# Patient Record
Sex: FEMALE | Race: WHITE | Employment: FULL TIME | URBAN - NONMETROPOLITAN AREA
[De-identification: names, ages, dates, MRNs, and addresses within clinical notes are randomized per-mention and may not be internally consistent; named-entity substitution may affect disease eponyms.]

---

## 2017-08-13 ENCOUNTER — HOSPITAL ENCOUNTER (EMERGENCY)
Age: 57
Discharge: HOME | End: 2017-08-13
Attending: EMERGENCY MEDICINE
Payer: MEDICAID

## 2017-08-13 VITALS
SYSTOLIC BLOOD PRESSURE: 159 MMHG | OXYGEN SATURATION: 100 % | TEMPERATURE: 98.5 F | RESPIRATION RATE: 14 BRPM | HEART RATE: 60 BPM | DIASTOLIC BLOOD PRESSURE: 102 MMHG

## 2017-08-13 DIAGNOSIS — E86.0 DEHYDRATION: ICD-10-CM

## 2017-08-13 LAB
ABSOLUTE EOS #: 0.1 K/UL (ref 0–0.4)
ABSOLUTE LYMPH #: 1.6 K/UL (ref 1.1–2.7)
ABSOLUTE MONO #: 0.3 K/UL (ref 0–1)
ALBUMIN SERPL-MCNC: 4.7 G/DL (ref 3.5–5.2)
ALBUMIN/GLOBULIN RATIO: ABNORMAL (ref 1–2.5)
ALP BLD-CCNC: 92 U/L (ref 35–104)
ALT SERPL-CCNC: 16 U/L (ref 5–33)
ANION GAP SERPL CALCULATED.3IONS-SCNC: 10 MMOL/L (ref 9–17)
AST SERPL-CCNC: 18 U/L
BASOPHILS # BLD: 1 %
BASOPHILS ABSOLUTE: 0 K/UL (ref 0–0.2)
BILIRUB SERPL-MCNC: 0.18 MG/DL (ref 0.3–1.2)
BILIRUBIN URINE: NEGATIVE
BUN BLDV-MCNC: 20 MG/DL (ref 6–20)
BUN/CREAT BLD: 40 (ref 9–20)
CALCIUM SERPL-MCNC: 10 MG/DL (ref 8.6–10.4)
CHLORIDE BLD-SCNC: 101 MMOL/L (ref 98–107)
CO2: 28 MMOL/L (ref 20–31)
COLOR: YELLOW
COMMENT UA: ABNORMAL
CREAT SERPL-MCNC: 0.5 MG/DL (ref 0.5–0.9)
DIFFERENTIAL TYPE: YES
EOSINOPHILS RELATIVE PERCENT: 1 %
GFR AFRICAN AMERICAN: >60 ML/MIN
GFR NON-AFRICAN AMERICAN: >60 ML/MIN
GFR SERPL CREATININE-BSD FRML MDRD: ABNORMAL ML/MIN/{1.73_M2}
GFR SERPL CREATININE-BSD FRML MDRD: ABNORMAL ML/MIN/{1.73_M2}
GLUCOSE BLD-MCNC: 191 MG/DL (ref 65–99)
GLUCOSE BLD-MCNC: 202 MG/DL (ref 70–99)
GLUCOSE URINE: ABNORMAL
HCT VFR BLD CALC: 44.6 % (ref 36–46)
HEMOGLOBIN: 14.6 G/DL (ref 12–16)
KETONES, URINE: NEGATIVE
LEUKOCYTE ESTERASE, URINE: NEGATIVE
LIPASE: 36 U/L (ref 13–60)
LYMPHOCYTES # BLD: 21 %
MCH RBC QN AUTO: 30.3 PG (ref 26–34)
MCHC RBC AUTO-ENTMCNC: 32.7 G/DL (ref 31–37)
MCV RBC AUTO: 92.7 FL (ref 80–100)
MONOCYTES # BLD: 5 %
NITRITE, URINE: NEGATIVE
PDW BLD-RTO: 13.9 % (ref 12.1–15.2)
PH UA: 7 (ref 5–8)
PLATELET # BLD: 299 K/UL (ref 140–450)
PLATELET ESTIMATE: NORMAL
PMV BLD AUTO: NORMAL FL (ref 6–12)
POTASSIUM SERPL-SCNC: 4.1 MMOL/L (ref 3.7–5.3)
PROTEIN UA: NEGATIVE
RBC # BLD: 4.82 M/UL (ref 4–5.2)
RBC # BLD: NORMAL 10*6/UL
SEG NEUTROPHILS: 72 %
SEGMENTED NEUTROPHILS ABSOLUTE COUNT: 5.2 K/UL (ref 2.5–7)
SODIUM BLD-SCNC: 139 MMOL/L (ref 135–144)
SPECIFIC GRAVITY UA: 1.01 (ref 1–1.03)
TOTAL PROTEIN: 8 G/DL (ref 6.4–8.3)
TURBIDITY: CLEAR
URINE HGB: NEGATIVE
UROBILINOGEN, URINE: NORMAL
WBC # BLD: 7.3 K/UL (ref 3.5–11)
WBC # BLD: NORMAL 10*3/UL

## 2017-08-13 PROCEDURE — 83690 ASSAY OF LIPASE: CPT

## 2017-08-13 PROCEDURE — 85025 COMPLETE CBC W/AUTO DIFF WBC: CPT

## 2017-08-13 PROCEDURE — 6360000002 HC RX W HCPCS: Performed by: EMERGENCY MEDICINE

## 2017-08-13 PROCEDURE — 80053 COMPREHEN METABOLIC PANEL: CPT

## 2017-08-13 PROCEDURE — 96375 TX/PRO/DX INJ NEW DRUG ADDON: CPT

## 2017-08-13 PROCEDURE — 2580000003 HC RX 258: Performed by: EMERGENCY MEDICINE

## 2017-08-13 PROCEDURE — 99283 EMERGENCY DEPT VISIT LOW MDM: CPT

## 2017-08-13 PROCEDURE — 96361 HYDRATE IV INFUSION ADD-ON: CPT

## 2017-08-13 PROCEDURE — 82947 ASSAY GLUCOSE BLOOD QUANT: CPT

## 2017-08-13 PROCEDURE — 96374 THER/PROPH/DIAG INJ IV PUSH: CPT

## 2017-08-13 PROCEDURE — 81003 URINALYSIS AUTO W/O SCOPE: CPT

## 2017-08-13 RX ORDER — 0.9 % SODIUM CHLORIDE 0.9 %
1000 INTRAVENOUS SOLUTION INTRAVENOUS ONCE
Status: COMPLETED | OUTPATIENT
Start: 2017-08-13 | End: 2017-08-13

## 2017-08-13 RX ORDER — DIPHENHYDRAMINE HYDROCHLORIDE 50 MG/ML
12.5 INJECTION INTRAMUSCULAR; INTRAVENOUS ONCE
Status: COMPLETED | OUTPATIENT
Start: 2017-08-13 | End: 2017-08-13

## 2017-08-13 RX ORDER — NALBUPHINE HCL 10 MG/ML
10 AMPUL (ML) INJECTION ONCE
Status: DISCONTINUED | OUTPATIENT
Start: 2017-08-13 | End: 2017-08-13

## 2017-08-13 RX ORDER — ONDANSETRON 4 MG/1
4 TABLET, ORALLY DISINTEGRATING ORAL ONCE
Status: COMPLETED | OUTPATIENT
Start: 2017-08-13 | End: 2017-08-13

## 2017-08-13 RX ORDER — NALBUPHINE HCL 10 MG/ML
10 AMPUL (ML) INJECTION ONCE
Status: COMPLETED | OUTPATIENT
Start: 2017-08-13 | End: 2017-08-13

## 2017-08-13 RX ADMIN — DIPHENHYDRAMINE HYDROCHLORIDE 12.5 MG: 50 INJECTION, SOLUTION INTRAMUSCULAR; INTRAVENOUS at 16:08

## 2017-08-13 RX ADMIN — NALBUPHINE HYDROCHLORIDE 10 MG: 10 INJECTION, SOLUTION INTRAMUSCULAR; INTRAVENOUS; SUBCUTANEOUS at 16:09

## 2017-08-13 RX ADMIN — SODIUM CHLORIDE 1000 ML: 0.9 INJECTION, SOLUTION INTRAVENOUS at 14:06

## 2017-08-13 RX ADMIN — ONDANSETRON 4 MG: 4 TABLET, ORALLY DISINTEGRATING ORAL at 14:07

## 2017-08-13 ASSESSMENT — PAIN SCALES - GENERAL: PAINLEVEL_OUTOF10: 10

## 2017-08-14 ENCOUNTER — HOSPITAL ENCOUNTER (OUTPATIENT)
Age: 57
Discharge: HOME | End: 2017-08-14
Payer: MEDICAID

## 2017-08-14 ENCOUNTER — TELEPHONE (OUTPATIENT)
Dept: FAMILY MEDICINE CLINIC | Age: 57
End: 2017-08-14

## 2017-08-14 ENCOUNTER — OFFICE VISIT (OUTPATIENT)
Dept: FAMILY MEDICINE CLINIC | Age: 57
End: 2017-08-14
Payer: MEDICAID

## 2017-08-14 VITALS
RESPIRATION RATE: 16 BRPM | HEIGHT: 65 IN | WEIGHT: 138.2 LBS | HEART RATE: 68 BPM | BODY MASS INDEX: 23.03 KG/M2 | SYSTOLIC BLOOD PRESSURE: 138 MMHG | DIASTOLIC BLOOD PRESSURE: 76 MMHG

## 2017-08-14 DIAGNOSIS — Z11.59 NEED FOR HEPATITIS C SCREENING TEST: ICD-10-CM

## 2017-08-14 DIAGNOSIS — Z00.00 PREVENTATIVE HEALTH CARE: ICD-10-CM

## 2017-08-14 DIAGNOSIS — Z11.4 ENCOUNTER FOR SCREENING FOR HIV: ICD-10-CM

## 2017-08-14 DIAGNOSIS — I10 ESSENTIAL HYPERTENSION: ICD-10-CM

## 2017-08-14 DIAGNOSIS — Z12.31 VISIT FOR SCREENING MAMMOGRAM: ICD-10-CM

## 2017-08-14 DIAGNOSIS — E11.9 TYPE 2 DIABETES MELLITUS WITHOUT COMPLICATION, WITHOUT LONG-TERM CURRENT USE OF INSULIN (HCC): Primary | ICD-10-CM

## 2017-08-14 DIAGNOSIS — E78.2 MIXED HYPERLIPIDEMIA: ICD-10-CM

## 2017-08-14 LAB
ANION GAP SERPL CALCULATED.3IONS-SCNC: 12 MMOL/L (ref 9–17)
BUN BLDV-MCNC: 16 MG/DL (ref 6–20)
BUN/CREAT BLD: 25 (ref 9–20)
CALCIUM SERPL-MCNC: 9.3 MG/DL (ref 8.6–10.4)
CHLORIDE BLD-SCNC: 99 MMOL/L (ref 98–107)
CHOLESTEROL/HDL RATIO: 2.8
CHOLESTEROL: 245 MG/DL
CO2: 27 MMOL/L (ref 20–31)
CREAT SERPL-MCNC: 0.65 MG/DL (ref 0.5–0.9)
GFR AFRICAN AMERICAN: >60 ML/MIN
GFR NON-AFRICAN AMERICAN: >60 ML/MIN
GFR SERPL CREATININE-BSD FRML MDRD: ABNORMAL ML/MIN/{1.73_M2}
GFR SERPL CREATININE-BSD FRML MDRD: ABNORMAL ML/MIN/{1.73_M2}
GLUCOSE BLD-MCNC: 216 MG/DL (ref 70–99)
HBA1C MFR BLD: 7.9 %
HDLC SERPL-MCNC: 88 MG/DL
HEPATITIS C ANTIBODY: NONREACTIVE
HIV AG/AB: NONREACTIVE
LDL CHOLESTEROL: 135 MG/DL (ref 0–130)
PATIENT FASTING?: NO
POTASSIUM SERPL-SCNC: 3.6 MMOL/L (ref 3.7–5.3)
SODIUM BLD-SCNC: 138 MMOL/L (ref 135–144)
TRIGL SERPL-MCNC: 112 MG/DL
VLDLC SERPL CALC-MCNC: ABNORMAL MG/DL (ref 1–30)

## 2017-08-14 PROCEDURE — 80061 LIPID PANEL: CPT

## 2017-08-14 PROCEDURE — 87389 HIV-1 AG W/HIV-1&-2 AB AG IA: CPT

## 2017-08-14 PROCEDURE — 36415 COLL VENOUS BLD VENIPUNCTURE: CPT

## 2017-08-14 PROCEDURE — 80048 BASIC METABOLIC PNL TOTAL CA: CPT

## 2017-08-14 PROCEDURE — 99203 OFFICE O/P NEW LOW 30 MIN: CPT | Performed by: FAMILY MEDICINE

## 2017-08-14 PROCEDURE — 83036 HEMOGLOBIN GLYCOSYLATED A1C: CPT | Performed by: FAMILY MEDICINE

## 2017-08-14 PROCEDURE — 86803 HEPATITIS C AB TEST: CPT

## 2017-08-14 RX ORDER — ROSUVASTATIN CALCIUM 40 MG/1
40 TABLET, COATED ORAL EVERY EVENING
Qty: 90 TABLET | Refills: 2 | Status: SHIPPED | OUTPATIENT
Start: 2017-08-14 | End: 2017-08-15 | Stop reason: CLARIF

## 2017-08-14 RX ORDER — LOSARTAN POTASSIUM 25 MG/1
25 TABLET ORAL DAILY
Qty: 90 TABLET | Refills: 2 | Status: SHIPPED | OUTPATIENT
Start: 2017-08-14 | End: 2017-08-14 | Stop reason: DRUGHIGH

## 2017-08-14 RX ORDER — GABAPENTIN 100 MG/1
100 CAPSULE ORAL 3 TIMES DAILY
Qty: 90 CAPSULE | Refills: 5 | Status: SHIPPED | OUTPATIENT
Start: 2017-08-14 | End: 2019-01-21 | Stop reason: ALTCHOICE

## 2017-08-14 RX ORDER — GLIPIZIDE 10 MG/1
10 TABLET ORAL DAILY
Qty: 90 TABLET | Refills: 2 | Status: SHIPPED | OUTPATIENT
Start: 2017-08-14 | End: 2017-10-03

## 2017-08-14 RX ORDER — HYDROCHLOROTHIAZIDE 12.5 MG/1
12.5 CAPSULE, GELATIN COATED ORAL DAILY
Qty: 90 CAPSULE | Refills: 2 | Status: SHIPPED | OUTPATIENT
Start: 2017-08-14 | End: 2019-01-21 | Stop reason: ALTCHOICE

## 2017-08-14 RX ORDER — LOSARTAN POTASSIUM 50 MG/1
50 TABLET ORAL DAILY
Qty: 90 TABLET | Refills: 2 | Status: SHIPPED | OUTPATIENT
Start: 2017-08-14 | End: 2019-02-02 | Stop reason: SDUPTHER

## 2017-08-14 ASSESSMENT — ENCOUNTER SYMPTOMS
CHOKING: 0
APNEA: 0
ANAL BLEEDING: 0
SHORTNESS OF BREATH: 0
VOICE CHANGE: 0
DIARRHEA: 0
FACIAL SWELLING: 0
STRIDOR: 0
VOMITING: 0
EYE REDNESS: 0
COUGH: 0
SORE THROAT: 0
EYE DISCHARGE: 0
COLOR CHANGE: 0
WHEEZING: 0
RECTAL PAIN: 0
EYE PAIN: 0
CONSTIPATION: 0
ALLERGIC/IMMUNOLOGIC NEGATIVE: 1
TROUBLE SWALLOWING: 0
PHOTOPHOBIA: 0
BACK PAIN: 0
NAUSEA: 0
EYE ITCHING: 0
VISUAL CHANGE: 0
ABDOMINAL DISTENTION: 0
ABDOMINAL PAIN: 0
SINUS PRESSURE: 0
BLOOD IN STOOL: 0
CHEST TIGHTNESS: 0
RHINORRHEA: 0

## 2017-08-14 ASSESSMENT — PATIENT HEALTH QUESTIONNAIRE - PHQ9
SUM OF ALL RESPONSES TO PHQ QUESTIONS 1-9: 2
SUM OF ALL RESPONSES TO PHQ9 QUESTIONS 1 & 2: 2
1. LITTLE INTEREST OR PLEASURE IN DOING THINGS: 1
2. FEELING DOWN, DEPRESSED OR HOPELESS: 1

## 2017-08-15 RX ORDER — SIMVASTATIN 40 MG
40 TABLET ORAL EVERY EVENING
Qty: 90 TABLET | Refills: 2 | Status: SHIPPED | OUTPATIENT
Start: 2017-08-15 | End: 2019-01-21 | Stop reason: SDUPTHER

## 2017-08-18 DIAGNOSIS — Z12.11 COLON CANCER SCREENING: Primary | ICD-10-CM

## 2017-08-18 LAB
CONTROL: YES
HEMOCCULT STL QL: NORMAL

## 2017-08-18 PROCEDURE — 82274 ASSAY TEST FOR BLOOD FECAL: CPT | Performed by: FAMILY MEDICINE

## 2017-08-23 ENCOUNTER — TELEPHONE (OUTPATIENT)
Dept: DIABETES SERVICES | Age: 57
End: 2017-08-23

## 2017-08-28 ENCOUNTER — TELEPHONE (OUTPATIENT)
Dept: DIABETES SERVICES | Age: 57
End: 2017-08-28

## 2017-08-30 ENCOUNTER — HOSPITAL ENCOUNTER (OUTPATIENT)
Dept: MAMMOGRAPHY | Age: 57
Discharge: HOME | End: 2017-08-30
Payer: MEDICAID

## 2017-08-30 DIAGNOSIS — Z12.31 VISIT FOR SCREENING MAMMOGRAM: ICD-10-CM

## 2017-08-30 PROCEDURE — G0202 SCR MAMMO BI INCL CAD: HCPCS

## 2017-08-31 ENCOUNTER — TELEPHONE (OUTPATIENT)
Dept: FAMILY MEDICINE CLINIC | Age: 57
End: 2017-08-31

## 2017-09-01 RX ORDER — GLUCOSAMINE HCL/CHONDROITIN SU 500-400 MG
1 CAPSULE ORAL 4 TIMES DAILY
Qty: 200 STRIP | Refills: 3 | Status: SHIPPED | OUTPATIENT
Start: 2017-09-01 | End: 2017-11-14 | Stop reason: ALTCHOICE

## 2017-09-14 ENCOUNTER — HOSPITAL ENCOUNTER (OUTPATIENT)
Dept: DIABETES SERVICES | Age: 57
Setting detail: THERAPIES SERIES
Discharge: HOME | End: 2017-09-14
Payer: MEDICAID

## 2017-09-14 VITALS
DIASTOLIC BLOOD PRESSURE: 74 MMHG | BODY MASS INDEX: 23.79 KG/M2 | WEIGHT: 142.8 LBS | SYSTOLIC BLOOD PRESSURE: 121 MMHG | HEIGHT: 65 IN

## 2017-09-14 PROCEDURE — G0108 DIAB MANAGE TRN  PER INDIV: HCPCS

## 2017-09-27 ENCOUNTER — HOSPITAL ENCOUNTER (OUTPATIENT)
Dept: DIABETES SERVICES | Age: 57
Setting detail: THERAPIES SERIES
Discharge: HOME | End: 2017-09-27
Payer: MEDICAID

## 2017-09-27 VITALS
BODY MASS INDEX: 23.32 KG/M2 | DIASTOLIC BLOOD PRESSURE: 72 MMHG | HEIGHT: 65 IN | SYSTOLIC BLOOD PRESSURE: 127 MMHG | WEIGHT: 140 LBS

## 2017-09-27 PROCEDURE — G0108 DIAB MANAGE TRN  PER INDIV: HCPCS

## 2017-10-03 ENCOUNTER — HOSPITAL ENCOUNTER (EMERGENCY)
Age: 57
Discharge: HOME | End: 2017-10-03
Attending: FAMILY MEDICINE
Payer: MEDICAID

## 2017-10-03 VITALS
WEIGHT: 140.87 LBS | SYSTOLIC BLOOD PRESSURE: 105 MMHG | HEART RATE: 81 BPM | DIASTOLIC BLOOD PRESSURE: 65 MMHG | BODY MASS INDEX: 23.44 KG/M2 | OXYGEN SATURATION: 97 % | TEMPERATURE: 97.4 F | RESPIRATION RATE: 18 BRPM

## 2017-10-03 DIAGNOSIS — E16.0 HYPOGLYCEMIA DUE TO INSULIN: Primary | ICD-10-CM

## 2017-10-03 DIAGNOSIS — E11.9 TYPE 2 DIABETES MELLITUS WITHOUT COMPLICATION, WITHOUT LONG-TERM CURRENT USE OF INSULIN (HCC): ICD-10-CM

## 2017-10-03 DIAGNOSIS — T38.3X5A HYPOGLYCEMIA DUE TO INSULIN: Primary | ICD-10-CM

## 2017-10-03 LAB
-: ABNORMAL
ABSOLUTE EOS #: 0.1 K/UL (ref 0–0.4)
ABSOLUTE LYMPH #: 1.7 K/UL (ref 1–4.8)
ABSOLUTE MONO #: 0.4 K/UL (ref 0–1)
AMORPHOUS: ABNORMAL
ANION GAP SERPL CALCULATED.3IONS-SCNC: 13 MMOL/L (ref 9–17)
BACTERIA: ABNORMAL
BASOPHILS # BLD: 1 %
BASOPHILS ABSOLUTE: 0 K/UL (ref 0–0.2)
BILIRUBIN URINE: NEGATIVE
BUN BLDV-MCNC: 18 MG/DL (ref 6–20)
BUN/CREAT BLD: 35 (ref 9–20)
CALCIUM SERPL-MCNC: 10 MG/DL (ref 8.6–10.4)
CASTS UA: ABNORMAL /LPF
CHLORIDE BLD-SCNC: 103 MMOL/L (ref 98–107)
CHP ED QC CHECK: YES
CHP ED QC CHECK: YES
CO2: 27 MMOL/L (ref 20–31)
COLOR: YELLOW
COMMENT UA: ABNORMAL
CREAT SERPL-MCNC: 0.51 MG/DL (ref 0.5–0.9)
CRYSTALS, UA: ABNORMAL /HPF
DIFFERENTIAL TYPE: YES
EOSINOPHILS RELATIVE PERCENT: 2 %
EPITHELIAL CELLS UA: ABNORMAL /HPF
GFR AFRICAN AMERICAN: >60 ML/MIN
GFR NON-AFRICAN AMERICAN: >60 ML/MIN
GFR SERPL CREATININE-BSD FRML MDRD: ABNORMAL ML/MIN/{1.73_M2}
GFR SERPL CREATININE-BSD FRML MDRD: ABNORMAL ML/MIN/{1.73_M2}
GLUCOSE BLD-MCNC: 103 MG/DL
GLUCOSE BLD-MCNC: 103 MG/DL (ref 65–99)
GLUCOSE BLD-MCNC: 129 MG/DL
GLUCOSE BLD-MCNC: 129 MG/DL (ref 65–99)
GLUCOSE BLD-MCNC: 58 MG/DL (ref 70–99)
GLUCOSE BLD-MCNC: 70 MG/DL (ref 65–99)
GLUCOSE URINE: NEGATIVE
HCT VFR BLD CALC: 43.5 % (ref 36–46)
HEMOGLOBIN: 14.3 G/DL (ref 12–16)
KETONES, URINE: NEGATIVE
LEUKOCYTE ESTERASE, URINE: ABNORMAL
LYMPHOCYTES # BLD: 26 %
MCH RBC QN AUTO: 30.5 PG (ref 26–34)
MCHC RBC AUTO-ENTMCNC: 32.8 G/DL (ref 31–37)
MCV RBC AUTO: 93.1 FL (ref 80–100)
MONOCYTES # BLD: 7 %
MUCUS: ABNORMAL
NITRITE, URINE: NEGATIVE
OTHER OBSERVATIONS UA: ABNORMAL
PDW BLD-RTO: 14.5 % (ref 12.1–15.2)
PH UA: 7 (ref 5–8)
PLATELET # BLD: 336 K/UL (ref 140–450)
PLATELET ESTIMATE: NORMAL
PMV BLD AUTO: NORMAL FL (ref 6–12)
POTASSIUM SERPL-SCNC: 4.2 MMOL/L (ref 3.7–5.3)
PROTEIN UA: NEGATIVE
RBC # BLD: 4.67 M/UL (ref 4–5.2)
RBC # BLD: NORMAL 10*6/UL
RBC UA: ABNORMAL /HPF (ref 0–2)
RENAL EPITHELIAL, UA: ABNORMAL /HPF
SEG NEUTROPHILS: 64 %
SEGMENTED NEUTROPHILS ABSOLUTE COUNT: 4.4 K/UL (ref 2.5–7)
SODIUM BLD-SCNC: 143 MMOL/L (ref 135–144)
SPECIFIC GRAVITY UA: 1 (ref 1–1.03)
TRICHOMONAS: ABNORMAL
TURBIDITY: CLEAR
URINE HGB: NEGATIVE
UROBILINOGEN, URINE: NORMAL
WBC # BLD: 6.7 K/UL (ref 3.5–11)
WBC # BLD: NORMAL 10*3/UL
WBC UA: ABNORMAL /HPF
YEAST: ABNORMAL

## 2017-10-03 PROCEDURE — 87086 URINE CULTURE/COLONY COUNT: CPT

## 2017-10-03 PROCEDURE — 99284 EMERGENCY DEPT VISIT MOD MDM: CPT

## 2017-10-03 PROCEDURE — 85025 COMPLETE CBC W/AUTO DIFF WBC: CPT

## 2017-10-03 PROCEDURE — 36415 COLL VENOUS BLD VENIPUNCTURE: CPT

## 2017-10-03 PROCEDURE — 80048 BASIC METABOLIC PNL TOTAL CA: CPT

## 2017-10-03 PROCEDURE — 82947 ASSAY GLUCOSE BLOOD QUANT: CPT

## 2017-10-03 PROCEDURE — 81001 URINALYSIS AUTO W/SCOPE: CPT

## 2017-10-03 RX ORDER — CALCIUM CARBONATE 500(1250)
500 TABLET ORAL DAILY
COMMUNITY
End: 2021-10-18

## 2017-10-03 RX ORDER — M-VIT,TX,IRON,MINS/CALC/FOLIC 27MG-0.4MG
1 TABLET ORAL DAILY
COMMUNITY

## 2017-10-03 NOTE — ED AVS SNAPSHOT
After Visit Summary  (Discharge Instructions)    Medication List for Home    Based on the information you provided to us as well as any changes during this visit, the following is your updated medication list.  Compare this with your prescription bottles at home. If you have any questions or concerns, contact your primary care physician's office. Daily Medication List (This medication list can be shared with any Healthcare provider who is helping you manage your medications)      You told us you were taking these medications at home, but the amount or how often you take this medication has CHANGED     insulin glargine 100 UNIT/ML injection pen   Commonly known as:  LANTUS SOLOSTAR   Inject 5 Units into the skin nightly   What changed:  how much to take         ASK your doctor about these medications if you have questions     BLOOD GLUCOSE TEST STRIPS Strp   1 Units by In Vitro route 4 times daily Freestyle precision.  E11.9       calcium carbonate 500 MG Tabs tablet   Commonly known as:  OSCAL   Take 500 mg by mouth daily       gabapentin 100 MG capsule   Commonly known as:  NEURONTIN   Take 1 capsule by mouth 3 times daily       hydrochlorothiazide 12.5 MG capsule   Commonly known as:  MICROZIDE   Take 1 capsule by mouth daily       Insulin Pen Needle 31G X 6 MM Misc   Commonly known as:  MEIJER PEN NEEDLES   1 each by Does not apply route daily       losartan 50 MG tablet   Commonly known as:  COZAAR   Take 1 tablet by mouth daily       metFORMIN 1000 MG tablet   Commonly known as:  GLUCOPHAGE   Take 1 tablet by mouth 2 times daily (with meals)       naproxen sodium 550 MG tablet   Commonly known as:  ANAPROX   Take 1 tablet by mouth twice a day when necessary pain       simvastatin 40 MG tablet   Commonly known as:  ZOCOR   Take 1 tablet by mouth every evening       therapeutic multivitamin-minerals tablet   Take 1 tablet by mouth daily These are the medications you have told us you were taking at home, STOP taking them after you leave the hospital     glipiZIDE 10 MG tablet   Commonly known as:  Yoabni Sam            Where to Get Your Medications      You can get these medications from any pharmacy     Bring a paper prescription for each of these medications     insulin glargine 100 UNIT/ML injection pen               Allergies as of 10/3/2017     No Known Allergies      Immunizations as of 10/3/2017     No immunizations on file. After Visit Summary    This summary was created for you. Thank you for entrusting your care to us. The following information includes details about your hospital/visit stay along with steps you should take to help with your recovery once you leave the hospital.  In this packet, you will find information about the topics listed below:    · Instructions about your medications including a list of your home medications  · A summary of your hospital visit  · Follow-up appointments once you have left the hospital  · Your care plan at home      You may receive a survey regarding the care you received during your stay. Your input is valuable to us. We encourage you to complete and return your survey in the envelope provided. We hope you will choose us in the future for your healthcare needs. Patient Information     Patient Name LORRAINE Partida 1960      Care Provided at:     Name Address Phone       [address] [phone]            Your Visit    Here you will find information about your visit, including the reason for your visit. Please take this sheet with you when you visit your doctor or other health care provider in the future. It will help determine the best possible medical care for you at that time. If you have any questions once you leave the hospital, please call the department phone number listed below.         Diagnoses this visit Your diagnoses were HYPOGLYCEMIA DUE TO INSULIN and TYPE 2 DIABETES MELLITUS WITHOUT COMPLICATION, WITHOUT LONG-TERM CURRENT USE OF INSULIN ([facility] [address] Visit Information     Date of Visit Department Dept Phone    10/3/2017 [facility] RE GARCIA [phone]      You were seen by     You were seen by Lakeshia Rodriguez DO. Follow-up Appointments    Below is a list of your follow-up and future appointments. This may not be a complete list as you may have made appointments directly with providers that we are not aware of or your providers may have made some for you. Please call your providers to confirm appointments. It is important to keep your appointments. Please bring your current insurance card, photo ID, co-pay, and all medication bottles to your appointment. If self-pay, payment is expected at the time of service. Follow-up Information     Follow up with Stanford Clayton MD In 3 days. Specialty:  Family Medicine    Contact information:    [address] [place] [address] [address]  [phone]        Future Appointments     10/16/2017 9:00 AM     Appointment with Maryan Garcia RD, LD at [address] and Nutrition [phone]   [facility] [address]  [place] [id number]       11/14/2017 3:30 PM     Appointment with Stanford Clayton MD at [address] [facility] ([phone])   Please arrive 15 minutes prior to appointment, bring photo ID and insurance card.    [address]         Preventive Care        Date Due    Diabetic Foot Exam 6/9/1972    Eye Exam By An Eye Doctor 6/9/1972    Tetanus Combination Vaccine (1 - Tdap) 6/9/1981    Pneumococcal Vaccine - Pneumovax for adults aged 19-64 years with: chronic heart disease, chronic lung disease, diabetes mellitus, alcoholism, chronic liver disease, or cigarette smoking. (1 of 1 - PPSV23) 6/9/1981    Pap Smear 6/9/1983    Urine Check For Kidney Problems 10/28/2012    Yearly Flu Vaccine (1) 9/1/2017 they absorb nicotine, carbon monoxide, and other ingredients of tobacco smoke. DO NOT SMOKE AROUND CHILDREN     Children exposed to secondhand smoke are at an increased risk of:  Sudden Infant Death Syndrome (SIDS), acute respiratory infections, inflammation of the middle ear, and severe asthma. Over a longer time, it causes heart disease and lung cancer. There is no safe level of exposure to secondhand smoke. [employer] Signup     [employer] allows you to send messages to your doctor, view your test results, renew your prescriptions, schedule appointments, view visit notes, and more. How Do I Sign Up? 1. In your Internet browser, go to [email or web address]  2. Click on the Sign Up Now link in the Sign In box. You will see the New Member Sign Up page. 3. Enter your [employer] Access Code exactly as it appears below. You will not need to use this code after youve completed the sign-up process. If you do not sign up before the expiration date, you must request a new code. [employer] Access Code: [email or web address]  Expires: 10/12/2017  3:29 PM    4. Enter your Social Security Number ([id number]) and Date of Birth (mm/dd/yyyy) as indicated and click Submit. You will be taken to the next sign-up page. 5. Create a [employer] ID. This will be your [employer] login ID and cannot be changed, so think of one that is secure and easy to remember. 6. Create a [employer] password. You can change your password at any time. 7. Enter your Password Reset Question and Answer. This can be used at a later time if you forget your password. 8. Enter your e-mail address. You will receive e-mail notification when new information is available in [address] 9. Click Sign Up. You can now view your medical record. Additional Information  If you have questions, please contact the physician practice where you receive care. Remember, [employer] is NOT to be used for urgent needs.  For not use the same needle more than one time. Where to give the shot  You can inject insulin into:  · The belly, but at least 2 inches from the belly button. This is considered the best place to inject insulin. · The top outer part of the thighs. Insulin usually is absorbed more slowly from this site, unless you exercise soon after giving the shot. · The outside of the upper arms. You may need help giving yourself shots in this area. · The buttocks. You may need help with injections in the buttocks. Your doctor may advise you to give your shots in different places on your body each day. This is called site rotation. If you are going to rotate sites, check with your doctor to make sure you know how to do it right. Use the same site at the same time of each day. For example, each day:  · At breakfast, give the shot in one of your arms. · At lunch, give the shot in one of your legs. · At dinner, give the shot in your belly. Slightly change the spot where you give an insulin shot each time you do it. For example, use five different places on the right upper arm, then use five places on the left upper arm. Using the same spot every time can cause bumps or pits in the skin and make the shots hurt more. It may also slow down how the insulin is absorbed into your body. Where can you learn more? Go to [email or web address] and sign in to your [employer] account. Enter M652 in the [facility] box to learn more about \"Giving a Single-Dose Insulin Shot: Care Instructions. \"     If you do not have an account, please click on the \"Sign Up Now\" link. Current as of: March 13, 2017  Content Version: 11.3  © 9330-3465 [employer], [employer]. Care instructions adapted under license by [facility] ([facility]).  If you have questions about a medical condition or this instruction, always ask your healthcare professional. [address] any warranty or liability for your use of this your level is getting back to your target range. Here are examples of quick-sugar foods that have 15 grams of carbohydrate:  · 3 to 4 glucose tablets  · 1 tube of glucose gel  · Hard candy (such as 3 Jolly Ranchers or 5 to 7 Life Savers)  · 1 tablespoon honey  · 2 tablespoons of raisins  · ½ cup to ¾ cup (4 to 6 ounces) of fruit juice or regular (not diet) soda  · 1 tablespoon of sugar  · 1 cup of fat-free milk  If you have problems with severe low blood sugar, someone else may have to give you a shot of glucagon. This is a hormone that raises blood sugar levels quickly. How can you prevent low blood sugar? You can take steps to prevent low blood sugar. · Follow your treatment plan. Take your insulin or other diabetes medicine exactly as your doctor prescribed it. Talk with your doctor if you're having low blood sugar often. Your medicine may need to be adjusted if it's causing your low blood sugar. · Check your blood sugar levels often. This helps you find early changes before an emergency happens. · Keep a quick-sugar food with you in case your blood sugar level drops low. · Eat small meals more often so that you don't get too hungry between meals. Don't skip meals. · Balance extra exercise with eating more. Check your blood sugar and learn how it changes after exercise. If your blood sugar stays at a normal level, you may not need to eat after you exercise. · Limit how much alcohol you drink. Alcohol can make low blood sugar go even lower. Don't drink alcohol if you have problems recognizing the early signs of low blood sugar. · Keep a diary of your symptoms. This helps you learn when changes in your body may signal low blood sugar. And keep track of how often you have low blood sugar, including when you last ate and what you ate. This will help you learn what causes your blood sugar to drop. · Learn about diabetes and low blood sugar.  Support groups or a diabetes education center can help you understand how medicines, diet, and exercise affect your blood sugar levels. Since low blood sugar levels can quickly become an emergency, be sure to wear medical alert jewelry, such as a medical alert bracelet. This is to let people know you have diabetes so they can get help for you. You can buy this at most drugstores. And make sure your family, friends, and coworkers know the symptoms of low blood sugar. Teach them what to do to get your sugar level up. Follow-up care is a key part of your treatment and safety. Be sure to make and go to all appointments, and call your doctor if you are having problems. It's also a good idea to know your test results and keep a list of the medicines you take. Where can you learn more? Go to [email or web address]://[email or web address].[email or web address].[email or web address] org and sign in to your [employer]ol[employer] account. Enter V140 in the [employer] box to learn more about \"Learning About Low Blood Sugar (Hypoglycemia) in Diabetes. \"     If you do not have an account, please click on the \"Sign Up Now\" link. Current as of: March 13, 2017  Content Version: 11.3  © 1943-9957 [employer]. Care instructions adapted under license by [facility] ([facility]). If you have questions about a medical condition or this instruction, always ask your healthcare professional. [address] any warranty or liability for your use of this information.

## 2017-10-03 NOTE — ED NOTES
Dr. Bridget Lagunas notified of FSBS result. Patient to be discharged.      Spencer Harris RN  10/03/17 2659

## 2017-10-03 NOTE — ED NOTES
Second breakfast tray given, relates she is full. Encouraged to eat.      Germaine Sauceda RN  10/03/17 3350

## 2017-10-04 LAB
CULTURE: NORMAL
CULTURE: NORMAL
Lab: NORMAL
SPECIMEN DESCRIPTION: NORMAL
SPECIMEN DESCRIPTION: NORMAL
STATUS: NORMAL

## 2017-10-23 ENCOUNTER — TELEPHONE (OUTPATIENT)
Dept: FAMILY MEDICINE CLINIC | Age: 57
End: 2017-10-23

## 2017-10-26 RX ORDER — LANCETS 30 GAUGE
EACH MISCELLANEOUS
Qty: 100 EACH | Refills: 5 | Status: SHIPPED | OUTPATIENT
Start: 2017-10-26

## 2017-10-26 RX ORDER — GLUCOSAMINE HCL/CHONDROITIN SU 500-400 MG
CAPSULE ORAL
Qty: 100 STRIP | Refills: 5 | Status: SHIPPED | OUTPATIENT
Start: 2017-10-26

## 2017-10-30 ENCOUNTER — HOSPITAL ENCOUNTER (OUTPATIENT)
Dept: NUTRITION | Age: 57
Discharge: HOME | End: 2017-10-30
Payer: MEDICAID

## 2017-10-30 PROCEDURE — 97802 MEDICAL NUTRITION INDIV IN: CPT

## 2017-10-30 NOTE — PROGRESS NOTES
having a computer or smart phone to look up any fast food information, so we looked up one of her choices at [employer]. .    Client goals:   Measure food portions    Read food labels for Total Carbohydrate and Serving Size    Use kitchen scale to weigh items like banana bread    Keep consistent meal timing    Avoid excess fat, saturated fat and trans fats    Use MyPlate as template for nutrition balance    Ask restaurants/fast foods for nutrition information    Continue to pack lunches for work    Monitor weight (has lost a little unintentionally)    Expected compliance:  Good. Client was attentive and had good baseline knowledge of carbohydrates (through prior reading). Client appears to be in an action to maintenence phase of change. Recommend follow up as needed. RD name and phone number provided. Thank you for the referral.    Electronically signed by Josr Lott RD, FAIZA on 10/30/2017 at 10:11 AM    Education session duration: 60 minutes; ([id number]). Reminder to ordering Physician/Provider:  Diabetes and CKD (non-dialysis) patients may have 2 hours of MNT education in subsequent years. Hours can be spread over any number of visits.

## 2017-11-02 ENCOUNTER — TELEPHONE (OUTPATIENT)
Dept: DIABETES SERVICES | Age: 57
End: 2017-11-02

## 2017-11-14 ENCOUNTER — OFFICE VISIT (OUTPATIENT)
Dept: FAMILY MEDICINE CLINIC | Age: 57
End: 2017-11-14
Payer: MEDICAID

## 2017-11-14 VITALS
SYSTOLIC BLOOD PRESSURE: 130 MMHG | WEIGHT: 135 LBS | DIASTOLIC BLOOD PRESSURE: 80 MMHG | RESPIRATION RATE: 18 BRPM | HEART RATE: 68 BPM | BODY MASS INDEX: 22.49 KG/M2 | HEIGHT: 65 IN

## 2017-11-14 DIAGNOSIS — I10 ESSENTIAL HYPERTENSION: ICD-10-CM

## 2017-11-14 DIAGNOSIS — Z23 NEED FOR DIPHTHERIA-TETANUS-PERTUSSIS (TDAP) VACCINE, ADULT/ADOLESCENT: ICD-10-CM

## 2017-11-14 DIAGNOSIS — Z23 NEED FOR 23-POLYVALENT PNEUMOCOCCAL POLYSACCHARIDE VACCINE: ICD-10-CM

## 2017-11-14 DIAGNOSIS — E11.9 TYPE 2 DIABETES MELLITUS WITHOUT COMPLICATION, WITHOUT LONG-TERM CURRENT USE OF INSULIN (HCC): Primary | ICD-10-CM

## 2017-11-14 LAB — HBA1C MFR BLD: 7.5 %

## 2017-11-14 PROCEDURE — 90472 IMMUNIZATION ADMIN EACH ADD: CPT | Performed by: FAMILY MEDICINE

## 2017-11-14 PROCEDURE — 3017F COLORECTAL CA SCREEN DOC REV: CPT | Performed by: FAMILY MEDICINE

## 2017-11-14 PROCEDURE — 83036 HEMOGLOBIN GLYCOSYLATED A1C: CPT | Performed by: FAMILY MEDICINE

## 2017-11-14 PROCEDURE — 99213 OFFICE O/P EST LOW 20 MIN: CPT | Performed by: FAMILY MEDICINE

## 2017-11-14 PROCEDURE — 90732 PPSV23 VACC 2 YRS+ SUBQ/IM: CPT | Performed by: FAMILY MEDICINE

## 2017-11-14 PROCEDURE — 1036F TOBACCO NON-USER: CPT | Performed by: FAMILY MEDICINE

## 2017-11-14 PROCEDURE — 3014F SCREEN MAMMO DOC REV: CPT | Performed by: FAMILY MEDICINE

## 2017-11-14 PROCEDURE — 90471 IMMUNIZATION ADMIN: CPT | Performed by: FAMILY MEDICINE

## 2017-11-14 PROCEDURE — 3045F PR MOST RECENT HEMOGLOBIN A1C LEVEL 7.0-9.0%: CPT | Performed by: FAMILY MEDICINE

## 2017-11-14 PROCEDURE — G8420 CALC BMI NORM PARAMETERS: HCPCS | Performed by: FAMILY MEDICINE

## 2017-11-14 PROCEDURE — G8482 FLU IMMUNIZE ORDER/ADMIN: HCPCS | Performed by: FAMILY MEDICINE

## 2017-11-14 PROCEDURE — G8427 DOCREV CUR MEDS BY ELIG CLIN: HCPCS | Performed by: FAMILY MEDICINE

## 2017-11-14 PROCEDURE — 90715 TDAP VACCINE 7 YRS/> IM: CPT | Performed by: FAMILY MEDICINE

## 2017-11-14 ASSESSMENT — ENCOUNTER SYMPTOMS
VOMITING: 0
ABDOMINAL PAIN: 0
RECTAL PAIN: 0
CHEST TIGHTNESS: 0
CHOKING: 0
FACIAL SWELLING: 0
EYE DISCHARGE: 0
BLOOD IN STOOL: 0
PHOTOPHOBIA: 0
STRIDOR: 0
ALLERGIC/IMMUNOLOGIC NEGATIVE: 1
WHEEZING: 0
CONSTIPATION: 0
EYE PAIN: 0
COUGH: 0
BACK PAIN: 0
ABDOMINAL DISTENTION: 0
RHINORRHEA: 0
EYE REDNESS: 0
SINUS PRESSURE: 0
EYE ITCHING: 0
APNEA: 0
SHORTNESS OF BREATH: 0
COLOR CHANGE: 0
TROUBLE SWALLOWING: 0
VOICE CHANGE: 0
SORE THROAT: 0
DIARRHEA: 0
NAUSEA: 0
ANAL BLEEDING: 0

## 2017-11-14 NOTE — PROGRESS NOTES
Subjective:      Patient ID: Frieda Barthel is a 56 y.o. female. Hypertension   This is a chronic problem. The current episode started more than 1 year ago. The problem has been resolved since onset. The problem is controlled. Pertinent negatives include no chest pain, headaches, neck pain, palpitations or shortness of breath. There are no associated agents to hypertension. Risk factors for coronary artery disease include diabetes mellitus, dyslipidemia, family history and post-menopausal state. Past treatments include angiotensin blockers and diuretics. There are no compliance problems. There is no history of angina, kidney disease, CAD/MI, CVA, heart failure, left ventricular hypertrophy, PVD, renovascular disease, retinopathy or a thyroid problem. There is no history of chronic renal disease, coarctation of the aorta, a hypertension causing med, pheochromocytoma or sleep apnea. Diabetes   She presents for her follow-up diabetic visit. She has type 2 diabetes mellitus. No MedicAlert identification noted. Her disease course has been improving. There are no hypoglycemic associated symptoms. Pertinent negatives for hypoglycemia include no confusion, dizziness, headaches, nervousness/anxiousness, pallor, seizures, speech difficulty or tremors. There are no diabetic associated symptoms. Pertinent negatives for diabetes include no chest pain, no fatigue and no weakness. There are no hypoglycemic complications. Symptoms are improving. Pertinent negatives for diabetic complications include no autonomic neuropathy, CVA, heart disease, impotence, nephropathy, peripheral neuropathy, PVD or retinopathy. Risk factors for coronary artery disease include diabetes mellitus, dyslipidemia, family history, hypertension and post-menopausal. Current diabetic treatment includes insulin injections and oral agent (monotherapy). She is compliant with treatment all of the time. She is currently taking insulin at bedtime.  Insulin injections are given by patient. Rotation sites for injection include the abdominal wall. Her weight is stable. She is following a generally healthy diet. Meal planning includes avoidance of concentrated sweets and carbohydrate counting. She has not had a previous visit with a dietitian. She participates in exercise intermittently. Her home blood glucose trend is decreasing steadily. Her breakfast blood glucose is taken between 7-8 am. Her breakfast blood glucose range is generally 130-140 mg/dl. Her dinner blood glucose range is generally 140-180 mg/dl. Her overall blood glucose range is 130-140 mg/dl. An ACE inhibitor/angiotensin II receptor blocker is being taken. She does not see a podiatrist.Eye exam is not current. Review of Systems   Constitutional: Negative for activity change, appetite change, chills, diaphoresis, fatigue, fever and unexpected weight change. HENT: Negative for congestion, dental problem, drooling, ear discharge, ear pain, facial swelling, hearing loss, mouth sores, nosebleeds, postnasal drip, rhinorrhea, sinus pressure, sneezing, sore throat, tinnitus, trouble swallowing and voice change. Eyes: Negative for photophobia, pain, discharge, redness, itching and visual disturbance. Respiratory: Negative for apnea, cough, choking, chest tightness, shortness of breath, wheezing and stridor. Cardiovascular: Negative for chest pain, palpitations and leg swelling. Gastrointestinal: Negative for abdominal distention, abdominal pain, anal bleeding, blood in stool, constipation, diarrhea, nausea, rectal pain and vomiting. Endocrine: Negative. Genitourinary: Negative for decreased urine volume, difficulty urinating, dyspareunia, dysuria, enuresis, flank pain, frequency, genital sores, hematuria, impotence, menstrual problem, pelvic pain, urgency, vaginal bleeding, vaginal discharge and vaginal pain.    Musculoskeletal: Negative for arthralgias, back pain, gait problem, joint ;will increase her Lantus to 5 units at HS ant 5 units before breakfast;she will continue eating healthy heart diets and regular exercise;advised to have yearly dilated eye exam by an ophthalmologist/eye doctor;follow up after 3 months or as needed. POCT glycosylated hemoglobin (Hb A1C)   2. Essential hypertension -controlled and stable;continue current care and medication;continue eating healthy heart diets and regular exercise;folloqw up after 3 months or as needed. 3. Need for 23-polyvalent pneumococcal polysaccharide vaccine  Pneumococcal polysaccharide vaccine 23-valent greater than or equal to 1yo subcutaneous/IM   4. Need for diphtheria-tetanus-pertussis (Tdap) vaccine, adult/adolescent  Tdap (age 10y-63y) IM (ADACEL)   Damari Chavez received counseling on the following healthy behaviors: nutrition, exercise and medication adherence    Patient given educational materials on HTN,DM2    Was a self-tracking handout given in paper form or via [employer]? No  If yes, see orders or list here. Discussed use, benefit, and side effects of prescribed medications. Barriers to medication compliance addressed. All patient questions answered. Pt voiced understanding. Nursing notes and vitals reviewed. MEDICAL HISTORY REVIEW    Medical, family, social and past history reviewed. This does contribute to patient's present condition.

## 2017-11-17 ENCOUNTER — TELEPHONE (OUTPATIENT)
Dept: FAMILY MEDICINE CLINIC | Age: 57
End: 2017-11-17

## 2017-11-17 NOTE — TELEPHONE ENCOUNTER
Is pt due for labs? Health Maintenance   Topic Date Due    Diabetic foot exam  06/09/1972    Diabetic retinal exam  06/09/1972    Cervical cancer screen  06/09/1983    Diabetic microalbuminuria test  10/28/2012    Lipid screen  08/14/2018    Colon Cancer Screen FIT/FOBT  08/18/2018    Diabetic hemoglobin A1C test  11/14/2018    Breast cancer screen  08/30/2019    DTaP/Tdap/Td vaccine (2 - Td) 11/14/2027    Flu vaccine  Completed    Pneumococcal med risk  Completed    Hepatitis C screen  Completed    HIV screen  Completed             (applicable per patient's age: Cancer Screenings, Depression Screening, Fall Risk Screening, Immunizations)    Hemoglobin A1C (%)   Date Value   11/14/2017 7.5   08/14/2017 7.9     Microalb/Crt.  Ratio (mcg/mg creat)   Date Value   10/28/2011 68     LDL Cholesterol (mg/dL)   Date Value   08/14/2017 135 (H)     AST (U/L)   Date Value   08/13/2017 18     ALT (U/L)   Date Value   08/13/2017 16     BUN (mg/dL)   Date Value   10/03/2017 18      (goal A1C is < 7)   (goal LDL is <100) need 30-50% reduction from baseline     BP Readings from Last 3 Encounters:   11/14/17 130/80   10/03/17 105/65   09/27/17 127/72    (goal /80)      All Future Testing planned in [facility]:      Next Visit Date:  Future Appointments  Date Time Provider Ignacio Barry   2/15/2018 4:00 PM Dominique May, Silvano Zay Martinez            Patient Active Problem List:     Type 2 diabetes mellitus without complication, without long-term current use of insulin ([address]     Mixed hyperlipidemia     Essential hypertension

## 2017-12-12 ENCOUNTER — OFFICE VISIT (OUTPATIENT)
Dept: FAMILY MEDICINE CLINIC | Age: 57
End: 2017-12-12
Payer: MEDICAID

## 2017-12-12 VITALS
HEART RATE: 68 BPM | BODY MASS INDEX: 22.49 KG/M2 | RESPIRATION RATE: 18 BRPM | DIASTOLIC BLOOD PRESSURE: 78 MMHG | HEIGHT: 65 IN | SYSTOLIC BLOOD PRESSURE: 136 MMHG | WEIGHT: 135 LBS

## 2017-12-12 DIAGNOSIS — F41.9 ANXIETY: Primary | ICD-10-CM

## 2017-12-12 PROCEDURE — 1036F TOBACCO NON-USER: CPT | Performed by: INTERNAL MEDICINE

## 2017-12-12 PROCEDURE — G8427 DOCREV CUR MEDS BY ELIG CLIN: HCPCS | Performed by: INTERNAL MEDICINE

## 2017-12-12 PROCEDURE — 99213 OFFICE O/P EST LOW 20 MIN: CPT | Performed by: INTERNAL MEDICINE

## 2017-12-12 PROCEDURE — G8420 CALC BMI NORM PARAMETERS: HCPCS | Performed by: INTERNAL MEDICINE

## 2017-12-12 PROCEDURE — 3014F SCREEN MAMMO DOC REV: CPT | Performed by: INTERNAL MEDICINE

## 2017-12-12 PROCEDURE — 3017F COLORECTAL CA SCREEN DOC REV: CPT | Performed by: INTERNAL MEDICINE

## 2017-12-12 PROCEDURE — G8482 FLU IMMUNIZE ORDER/ADMIN: HCPCS | Performed by: INTERNAL MEDICINE

## 2017-12-12 RX ORDER — BUSPIRONE HYDROCHLORIDE 7.5 MG/1
7.5 TABLET ORAL 2 TIMES DAILY
Qty: 60 TABLET | Refills: 0 | Status: SHIPPED | OUTPATIENT
Start: 2017-12-12 | End: 2018-01-09 | Stop reason: SDUPTHER

## 2017-12-12 ASSESSMENT — ENCOUNTER SYMPTOMS
NAUSEA: 0
SORE THROAT: 0
SHORTNESS OF BREATH: 0
ABDOMINAL PAIN: 0
CONSTIPATION: 0
DIARRHEA: 0
VOMITING: 0
COUGH: 0
BLURRED VISION: 0
HEARTBURN: 0

## 2017-12-12 NOTE — PROGRESS NOTES
GLUCOSE TEST STRIPS) STRP Please dispense strips  That are covered by her insurance and her meter  DX-E11.9  Pt tests 4 x day 10/26/17  Yes Carroll Grewal MD   Lancets MISC Please dispense lancets to go with meter and test strips  DX E11.9 pt tests QID 10/26/17  Yes Carroll Grewal MD   calcium carbonate (OSCAL) 500 MG TABS tablet Take 500 mg by mouth daily   Yes Historical Provider, MD   Multiple Vitamins-Minerals (THERAPEUTIC MULTIVITAMIN-MINERALS) tablet Take 1 tablet by mouth daily   Yes Historical Provider, MD   simvastatin (ZOCOR) 40 MG tablet Take 1 tablet by mouth every evening 8/15/17  Yes Carroll Grewal MD   metFORMIN (GLUCOPHAGE) 1000 MG tablet Take 1 tablet by mouth 2 times daily (with meals) 8/14/17  Yes Carroll Grewal MD   Insulin Pen Needle (MEIJER PEN NEEDLES) 31G X 6 MM MISC 1 each by Does not apply route daily 8/14/17  Yes Carroll Grewal MD   losartan (COZAAR) 50 MG tablet Take 1 tablet by mouth daily 8/14/17  Yes Carroll Grewal MD   Blood Glucose Monitoring Suppl MILANA 1 Units by Does not apply route once for 1 dose DX-E11.9 10/26/17 10/26/17  Carroll Grewal MD   hydrochlorothiazide (MICROZIDE) 12.5 MG capsule Take 1 capsule by mouth daily 8/14/17   Carroll Grewal MD   gabapentin (NEURONTIN) 100 MG capsule Take 1 capsule by mouth 3 times daily  Patient taking differently: Take 100 mg by mouth as needed  8/14/17   Carroll Grewal MD   naproxen sodium (ANAPROX) 550 MG tablet Take 1 tablet by mouth twice a day when necessary pain 2/7/13   Goldie Camacho DO        Allergies:       Review of patient's allergies indicates no known allergies. Social History:     Tobacco:    reports that she quit smoking about 19 years ago. She has a 10.00 pack-year smoking history. She has never used smokeless tobacco.  Alcohol:      reports that she does not drink alcohol. Drug Use:  reports that she does not use drugs.     Family History:     Family History   Problem Relation Age of Onset

## 2018-01-04 ENCOUNTER — HOSPITAL ENCOUNTER (OUTPATIENT)
Age: 58
Discharge: HOME | End: 2018-01-04
Payer: MEDICAID

## 2018-01-04 DIAGNOSIS — F41.9 ANXIETY: ICD-10-CM

## 2018-01-04 LAB — TSH SERPL DL<=0.05 MIU/L-ACNC: 2.6 MIU/L (ref 0.3–5)

## 2018-01-04 PROCEDURE — 36415 COLL VENOUS BLD VENIPUNCTURE: CPT

## 2018-01-04 PROCEDURE — 84443 ASSAY THYROID STIM HORMONE: CPT

## 2018-01-09 ENCOUNTER — OFFICE VISIT (OUTPATIENT)
Dept: FAMILY MEDICINE CLINIC | Age: 58
End: 2018-01-09
Payer: MEDICAID

## 2018-01-09 VITALS
DIASTOLIC BLOOD PRESSURE: 76 MMHG | SYSTOLIC BLOOD PRESSURE: 120 MMHG | HEIGHT: 62 IN | BODY MASS INDEX: 24.66 KG/M2 | RESPIRATION RATE: 18 BRPM | WEIGHT: 134 LBS | HEART RATE: 68 BPM

## 2018-01-09 DIAGNOSIS — F41.9 ANXIETY: Primary | ICD-10-CM

## 2018-01-09 DIAGNOSIS — E11.9 TYPE 2 DIABETES MELLITUS WITHOUT COMPLICATION, WITHOUT LONG-TERM CURRENT USE OF INSULIN (HCC): ICD-10-CM

## 2018-01-09 PROCEDURE — 3017F COLORECTAL CA SCREEN DOC REV: CPT | Performed by: INTERNAL MEDICINE

## 2018-01-09 PROCEDURE — G8427 DOCREV CUR MEDS BY ELIG CLIN: HCPCS | Performed by: INTERNAL MEDICINE

## 2018-01-09 PROCEDURE — G8420 CALC BMI NORM PARAMETERS: HCPCS | Performed by: INTERNAL MEDICINE

## 2018-01-09 PROCEDURE — G8482 FLU IMMUNIZE ORDER/ADMIN: HCPCS | Performed by: INTERNAL MEDICINE

## 2018-01-09 PROCEDURE — 3014F SCREEN MAMMO DOC REV: CPT | Performed by: INTERNAL MEDICINE

## 2018-01-09 PROCEDURE — 1036F TOBACCO NON-USER: CPT | Performed by: INTERNAL MEDICINE

## 2018-01-09 PROCEDURE — 3046F HEMOGLOBIN A1C LEVEL >9.0%: CPT | Performed by: INTERNAL MEDICINE

## 2018-01-09 PROCEDURE — 99213 OFFICE O/P EST LOW 20 MIN: CPT | Performed by: INTERNAL MEDICINE

## 2018-01-09 RX ORDER — BUSPIRONE HYDROCHLORIDE 15 MG/1
15 TABLET ORAL 3 TIMES DAILY
Qty: 90 TABLET | Refills: 0 | Status: SHIPPED | OUTPATIENT
Start: 2018-01-09 | End: 2020-01-23

## 2018-01-09 ASSESSMENT — ENCOUNTER SYMPTOMS
ABDOMINAL PAIN: 0
NAUSEA: 0
HEARTBURN: 0
SHORTNESS OF BREATH: 0
COUGH: 0
SORE THROAT: 0
BLURRED VISION: 0

## 2018-01-09 NOTE — PROGRESS NOTES
Yes Historical Provider, MD   Multiple Vitamins-Minerals (THERAPEUTIC MULTIVITAMIN-MINERALS) tablet Take 1 tablet by mouth daily   Yes Historical Provider, MD   simvastatin (ZOCOR) 40 MG tablet Take 1 tablet by mouth every evening 8/15/17  Yes Rick Adame MD   metFORMIN (GLUCOPHAGE) 1000 MG tablet Take 1 tablet by mouth 2 times daily (with meals) 8/14/17  Yes Rick Adame MD   hydrochlorothiazide (MICROZIDE) 12.5 MG capsule Take 1 capsule by mouth daily 8/14/17  Yes Rick Adame MD   gabapentin (NEURONTIN) 100 MG capsule Take 1 capsule by mouth 3 times daily  Patient taking differently: Take 100 mg by mouth as needed  8/14/17  Yes Rick Adame MD   Insulin Pen Needle (MEIJER PEN NEEDLES) 31G X 6 MM MISC 1 each by Does not apply route daily 8/14/17  Yes Rick Adame MD   losartan (COZAAR) 50 MG tablet Take 1 tablet by mouth daily 8/14/17  Yes Rick Adame MD   naproxen sodium (ANAPROX) 550 MG tablet Take 1 tablet by mouth twice a day when necessary pain 2/7/13  Yes Guanaco Kaufman,    Blood Glucose Monitoring Suppl MILANA 1 Units by Does not apply route once for 1 dose DX-E11.9 10/26/17 10/26/17  Rick Adame MD        Allergies:       Review of patient's allergies indicates no known allergies. Social History:     Tobacco:    reports that she quit smoking about 19 years ago. She has a 10.00 pack-year smoking history. She has never used smokeless tobacco.  Alcohol:      reports that she does not drink alcohol. Drug Use:  reports that she does not use drugs. Family History:     Family History   Problem Relation Age of Onset    Diabetes Father        Review of Systems:         Review of Systems   Constitutional: Negative for chills, fever and weight loss. HENT: Negative for congestion and sore throat. Eyes: Negative for blurred vision. Respiratory: Negative for cough and shortness of breath. Cardiovascular: Negative for chest pain and palpitations. Gastrointestinal: Negative for abdominal pain, heartburn and nausea. Genitourinary: Negative for dysuria. Skin: Negative for rash. Neurological: Negative for dizziness, tingling, weakness and headaches. Psychiatric/Behavioral: Negative for depression, memory loss, substance abuse and suicidal ideas. The patient is not nervous/anxious and does not have insomnia. Physical Exam:     Vitals:  /76 (Site: Left Arm, Position: Sitting, Cuff Size: Medium Adult)   Pulse 68   Resp 18   Ht 5' 2\" (1.575 m)   Wt 134 lb (60.8 kg)   BMI 24.51 kg/m²       Physical Exam   Constitutional: She is oriented to person, place, and time. She appears well-developed and well-nourished. No distress. HENT:   Head: Normocephalic and atraumatic. Right Ear: External ear normal.   Left Ear: External ear normal.   Neck: No thyromegaly present. Cardiovascular: Normal rate, regular rhythm and normal heart sounds. No murmur heard. Pulmonary/Chest: Effort normal and breath sounds normal. She has no wheezes. She has no rales. Abdominal: Soft. Bowel sounds are normal. She exhibits no distension and no mass. There is no tenderness. Musculoskeletal: Normal range of motion. She exhibits no edema or deformity. Lymphadenopathy:     She has no cervical adenopathy. Neurological: She is alert and oriented to person, place, and time. Skin: Skin is warm and dry. No rash noted. Psychiatric: She has a normal mood and affect. Her behavior is normal. Judgment normal.   Vitals reviewed.             Data:     Lab Results   Component Value Date     10/03/2017    K 4.2 10/03/2017     10/03/2017    CO2 27 10/03/2017    BUN 18 10/03/2017    CREATININE 0.51 10/03/2017    GLUCOSE 103 10/03/2017    PROT 8.0 08/13/2017    LABALBU 4.7 08/13/2017    BILITOT 0.18 08/13/2017    ALKPHOS 92 08/13/2017    AST 18 08/13/2017    ALT 16 08/13/2017     Lab Results   Component Value Date    WBC 6.7 10/03/2017    RBC 4.67

## 2018-02-21 ENCOUNTER — TELEPHONE (OUTPATIENT)
Dept: DIABETES SERVICES | Age: 58
End: 2018-02-21

## 2018-04-05 ENCOUNTER — OFFICE VISIT (OUTPATIENT)
Dept: FAMILY MEDICINE CLINIC | Age: 58
End: 2018-04-05
Payer: MEDICAID

## 2018-04-05 VITALS
SYSTOLIC BLOOD PRESSURE: 138 MMHG | RESPIRATION RATE: 18 BRPM | HEART RATE: 75 BPM | BODY MASS INDEX: 24.48 KG/M2 | DIASTOLIC BLOOD PRESSURE: 88 MMHG | HEIGHT: 62 IN | WEIGHT: 133 LBS

## 2018-04-05 DIAGNOSIS — R21 SKIN RASH: ICD-10-CM

## 2018-04-05 DIAGNOSIS — E11.9 TYPE 2 DIABETES MELLITUS WITHOUT COMPLICATION, WITHOUT LONG-TERM CURRENT USE OF INSULIN (HCC): Primary | ICD-10-CM

## 2018-04-05 DIAGNOSIS — R63.4 WEIGHT LOSS: ICD-10-CM

## 2018-04-05 DIAGNOSIS — R53.83 FATIGUE, UNSPECIFIED TYPE: ICD-10-CM

## 2018-04-05 LAB — HBA1C MFR BLD: 7.3 %

## 2018-04-05 PROCEDURE — 83036 HEMOGLOBIN GLYCOSYLATED A1C: CPT | Performed by: INTERNAL MEDICINE

## 2018-04-05 PROCEDURE — 3014F SCREEN MAMMO DOC REV: CPT | Performed by: INTERNAL MEDICINE

## 2018-04-05 PROCEDURE — G8420 CALC BMI NORM PARAMETERS: HCPCS | Performed by: INTERNAL MEDICINE

## 2018-04-05 PROCEDURE — 1036F TOBACCO NON-USER: CPT | Performed by: INTERNAL MEDICINE

## 2018-04-05 PROCEDURE — G8427 DOCREV CUR MEDS BY ELIG CLIN: HCPCS | Performed by: INTERNAL MEDICINE

## 2018-04-05 PROCEDURE — 3045F PR MOST RECENT HEMOGLOBIN A1C LEVEL 7.0-9.0%: CPT | Performed by: INTERNAL MEDICINE

## 2018-04-05 PROCEDURE — 3017F COLORECTAL CA SCREEN DOC REV: CPT | Performed by: INTERNAL MEDICINE

## 2018-04-05 PROCEDURE — 99214 OFFICE O/P EST MOD 30 MIN: CPT | Performed by: INTERNAL MEDICINE

## 2018-04-05 ASSESSMENT — ENCOUNTER SYMPTOMS
ABDOMINAL PAIN: 0
SORE THROAT: 0
NAUSEA: 0
COUGH: 0
DIARRHEA: 0
CONSTIPATION: 0
NAIL CHANGES: 0
HEARTBURN: 0
VOMITING: 0
SHORTNESS OF BREATH: 0
BLURRED VISION: 0

## 2018-04-09 ENCOUNTER — HOSPITAL ENCOUNTER (OUTPATIENT)
Age: 58
Discharge: HOME | End: 2018-04-09
Payer: MEDICAID

## 2018-04-09 DIAGNOSIS — R53.83 FATIGUE, UNSPECIFIED TYPE: ICD-10-CM

## 2018-04-09 DIAGNOSIS — R63.4 WEIGHT LOSS: ICD-10-CM

## 2018-04-09 LAB
ABSOLUTE EOS #: 0.2 K/UL (ref 0–0.4)
ABSOLUTE IMMATURE GRANULOCYTE: ABNORMAL K/UL (ref 0–0.3)
ABSOLUTE LYMPH #: 2.2 K/UL (ref 1–4.8)
ABSOLUTE MONO #: 0.4 K/UL (ref 0–1)
ALBUMIN SERPL-MCNC: 4.2 G/DL (ref 3.5–5.2)
ALBUMIN/GLOBULIN RATIO: ABNORMAL (ref 1–2.5)
ALP BLD-CCNC: 72 U/L (ref 35–104)
ALT SERPL-CCNC: 15 U/L (ref 5–33)
ANION GAP SERPL CALCULATED.3IONS-SCNC: 10 MMOL/L (ref 9–17)
AST SERPL-CCNC: 17 U/L
BASOPHILS # BLD: 1 % (ref 0–2)
BASOPHILS ABSOLUTE: 0 K/UL (ref 0–0.2)
BILIRUB SERPL-MCNC: <0.1 MG/DL (ref 0.3–1.2)
BILIRUBIN DIRECT: <0.08 MG/DL
BILIRUBIN, INDIRECT: ABNORMAL MG/DL (ref 0–1)
BUN BLDV-MCNC: 22 MG/DL (ref 6–20)
BUN/CREAT BLD: 39 (ref 9–20)
C-REACTIVE PROTEIN: 0.4 MG/L (ref 0–5)
CALCIUM SERPL-MCNC: 9.3 MG/DL (ref 8.6–10.4)
CHLORIDE BLD-SCNC: 101 MMOL/L (ref 98–107)
CO2: 29 MMOL/L (ref 20–31)
CREAT SERPL-MCNC: 0.57 MG/DL (ref 0.5–0.9)
DIFFERENTIAL TYPE: YES
EOSINOPHILS RELATIVE PERCENT: 3 % (ref 0–5)
GFR AFRICAN AMERICAN: >60 ML/MIN
GFR NON-AFRICAN AMERICAN: >60 ML/MIN
GFR SERPL CREATININE-BSD FRML MDRD: ABNORMAL ML/MIN/{1.73_M2}
GFR SERPL CREATININE-BSD FRML MDRD: ABNORMAL ML/MIN/{1.73_M2}
GLOBULIN: ABNORMAL G/DL (ref 1.5–3.8)
GLUCOSE BLD-MCNC: 183 MG/DL (ref 70–99)
HCT VFR BLD CALC: 37.8 % (ref 36–46)
HEMOGLOBIN: 12.7 G/DL (ref 12–16)
IMMATURE GRANULOCYTES: ABNORMAL %
LYMPHOCYTES # BLD: 41 % (ref 15–40)
MCH RBC QN AUTO: 31 PG (ref 26–34)
MCHC RBC AUTO-ENTMCNC: 33.5 G/DL (ref 31–37)
MCV RBC AUTO: 92.3 FL (ref 80–100)
MONOCYTES # BLD: 8 % (ref 4–8)
NRBC AUTOMATED: ABNORMAL PER 100 WBC
PDW BLD-RTO: 13.8 % (ref 12.1–15.2)
PLATELET # BLD: 333 K/UL (ref 140–450)
PLATELET ESTIMATE: ABNORMAL
PMV BLD AUTO: ABNORMAL FL (ref 6–12)
POTASSIUM SERPL-SCNC: 4 MMOL/L (ref 3.7–5.3)
RBC # BLD: 4.09 M/UL (ref 4–5.2)
RBC # BLD: ABNORMAL 10*6/UL
SEDIMENTATION RATE, ERYTHROCYTE: 23 MM (ref 0–30)
SEG NEUTROPHILS: 47 % (ref 47–75)
SEGMENTED NEUTROPHILS ABSOLUTE COUNT: 2.5 K/UL (ref 2.5–7)
SODIUM BLD-SCNC: 140 MMOL/L (ref 135–144)
TOTAL CK: 47 U/L (ref 26–192)
TOTAL PROTEIN: 6.8 G/DL (ref 6.4–8.3)
WBC # BLD: 5.3 K/UL (ref 3.5–11)
WBC # BLD: ABNORMAL 10*3/UL

## 2018-04-09 PROCEDURE — 82550 ASSAY OF CK (CPK): CPT

## 2018-04-09 PROCEDURE — 86038 ANTINUCLEAR ANTIBODIES: CPT

## 2018-04-09 PROCEDURE — 80048 BASIC METABOLIC PNL TOTAL CA: CPT

## 2018-04-09 PROCEDURE — 85025 COMPLETE CBC W/AUTO DIFF WBC: CPT

## 2018-04-09 PROCEDURE — 85651 RBC SED RATE NONAUTOMATED: CPT

## 2018-04-09 PROCEDURE — 86140 C-REACTIVE PROTEIN: CPT

## 2018-04-09 PROCEDURE — 80076 HEPATIC FUNCTION PANEL: CPT

## 2018-04-09 PROCEDURE — 36415 COLL VENOUS BLD VENIPUNCTURE: CPT

## 2018-04-10 LAB — ANTI-NUCLEAR ANTIBODY (ANA): ABNORMAL

## 2018-07-02 ENCOUNTER — OFFICE VISIT (OUTPATIENT)
Dept: FAMILY MEDICINE CLINIC | Age: 58
End: 2018-07-02
Payer: MEDICAID

## 2018-07-02 VITALS
WEIGHT: 133 LBS | DIASTOLIC BLOOD PRESSURE: 78 MMHG | SYSTOLIC BLOOD PRESSURE: 148 MMHG | BODY MASS INDEX: 24.48 KG/M2 | HEIGHT: 62 IN | HEART RATE: 75 BPM | RESPIRATION RATE: 18 BRPM

## 2018-07-02 DIAGNOSIS — E11.9 TYPE 2 DIABETES MELLITUS WITHOUT COMPLICATION, WITHOUT LONG-TERM CURRENT USE OF INSULIN (HCC): Primary | ICD-10-CM

## 2018-07-02 DIAGNOSIS — E78.2 MIXED HYPERLIPIDEMIA: ICD-10-CM

## 2018-07-02 DIAGNOSIS — I10 ESSENTIAL HYPERTENSION: ICD-10-CM

## 2018-07-02 DIAGNOSIS — Z91.14 NONCOMPLIANCE WITH MEDICATIONS: ICD-10-CM

## 2018-07-02 PROBLEM — Z91.148 NONCOMPLIANCE WITH MEDICATIONS: Status: ACTIVE | Noted: 2018-07-02

## 2018-07-02 LAB — HBA1C MFR BLD: 7.3 %

## 2018-07-02 PROCEDURE — 3017F COLORECTAL CA SCREEN DOC REV: CPT | Performed by: INTERNAL MEDICINE

## 2018-07-02 PROCEDURE — 99214 OFFICE O/P EST MOD 30 MIN: CPT | Performed by: INTERNAL MEDICINE

## 2018-07-02 PROCEDURE — 1036F TOBACCO NON-USER: CPT | Performed by: INTERNAL MEDICINE

## 2018-07-02 PROCEDURE — G8427 DOCREV CUR MEDS BY ELIG CLIN: HCPCS | Performed by: INTERNAL MEDICINE

## 2018-07-02 PROCEDURE — G8420 CALC BMI NORM PARAMETERS: HCPCS | Performed by: INTERNAL MEDICINE

## 2018-07-02 PROCEDURE — 3045F PR MOST RECENT HEMOGLOBIN A1C LEVEL 7.0-9.0%: CPT | Performed by: INTERNAL MEDICINE

## 2018-07-02 PROCEDURE — 83036 HEMOGLOBIN GLYCOSYLATED A1C: CPT | Performed by: INTERNAL MEDICINE

## 2018-07-02 PROCEDURE — 2022F DILAT RTA XM EVC RTNOPTHY: CPT | Performed by: INTERNAL MEDICINE

## 2018-07-02 ASSESSMENT — ENCOUNTER SYMPTOMS
NAUSEA: 0
SHORTNESS OF BREATH: 0
SORE THROAT: 0
COUGH: 0
BLURRED VISION: 0
ABDOMINAL PAIN: 0
HEARTBURN: 0

## 2018-07-02 NOTE — PROGRESS NOTES
HPI Notes    Name: Randall Medina  : 1960         Chief Complaint:     Chief Complaint   Patient presents with    Diabetes     3 month follow up    Hypertension       History of Present Illness:        Mikaela Howard is here to follow up for DM, HTN And hyperlipidemia. She admits to not taking her blood pressure medications and Zocor for at least 1 month  She also quit taking Lantus because \" My sugar is running low\". When asked how long she says around 110. She reports no side effects from blood pressure medications. Diabetes   She presents for her follow-up diabetic visit. She has type 2 diabetes mellitus. Her disease course has been stable. There are no hypoglycemic associated symptoms. Pertinent negatives for hypoglycemia include no confusion, headaches or nervousness/anxiousness. Pertinent negatives for diabetes include no blurred vision, no chest pain, no fatigue and no foot paresthesias. There are no hypoglycemic complications. Symptoms are stable. There are no diabetic complications. Risk factors for coronary artery disease include diabetes mellitus, dyslipidemia, family history and hypertension. She is compliant with treatment all of the time. Her weight is stable. She is following a generally healthy diet. She has not had a previous visit with a dietitian. There is no change in her home blood glucose trend. Her overall blood glucose range is 110-130 mg/dl. An ACE inhibitor/angiotensin II receptor blocker is not being taken (patient noncompliant with blood pressure meds). Hypertension   This is a chronic problem. The current episode started more than 1 year ago. The problem has been gradually worsening since onset. The problem is uncontrolled. Pertinent negatives include no blurred vision, chest pain, headaches, palpitations or shortness of breath. Risk factors for coronary artery disease include diabetes mellitus and dyslipidemia. Past treatments include diuretics and angiotensin blockers. once for 1 dose DX-E11.9 10/26/17 10/26/17  Vonnie Hamilton MD   Lancets MISC Please dispense lancets to go with meter and test strips  DX E11.9 pt tests QID 10/26/17   Vonnie Hamilton MD   simvastatin (ZOCOR) 40 MG tablet Take 1 tablet by mouth every evening 8/15/17   Vonnie Hamilton MD   hydrochlorothiazide (MICROZIDE) 12.5 MG capsule Take 1 capsule by mouth daily 8/14/17   Vonnie Hamilton MD   gabapentin (NEURONTIN) 100 MG capsule Take 1 capsule by mouth 3 times daily  Patient taking differently: Take 100 mg by mouth as needed  8/14/17   Vonnie Hamilton MD   Insulin Pen Needle (MEIJER PEN NEEDLES) 31G X 6 MM MISC 1 each by Does not apply route daily 8/14/17   Vonnie Hamilton MD   losartan (COZAAR) 50 MG tablet Take 1 tablet by mouth daily 8/14/17   Vonnie Hamilton MD   naproxen sodium (ANAPROX) 550 MG tablet Take 1 tablet by mouth twice a day when necessary pain 2/7/13   Pete Joiner DO        Allergies:       Patient has no known allergies. Social History:     Tobacco:    reports that she quit smoking about 19 years ago. She has a 10.00 pack-year smoking history. She has never used smokeless tobacco.  Alcohol:      reports that she does not drink alcohol. Drug Use:  reports that she does not use drugs. Family History:     Family History   Problem Relation Age of Onset    Diabetes Father        Review of Systems:         Review of Systems   Constitutional: Negative for chills, fatigue and fever. HENT: Negative for congestion and sore throat. Eyes: Negative for blurred vision. Respiratory: Negative for cough and shortness of breath. Cardiovascular: Negative for chest pain and palpitations. Gastrointestinal: Negative for abdominal pain, heartburn and nausea. Genitourinary: Negative for dysuria. Skin: Negative for rash. Neurological: Negative for headaches. Psychiatric/Behavioral: Negative for confusion and depression. The patient is not nervous/anxious.

## 2018-07-12 DIAGNOSIS — E11.9 TYPE 2 DIABETES MELLITUS WITHOUT COMPLICATION, WITHOUT LONG-TERM CURRENT USE OF INSULIN (HCC): ICD-10-CM

## 2018-07-12 NOTE — TELEPHONE ENCOUNTER
Rx refill request    [id number]    Metformin 1000 mg 1 tab bid    Last seen 7/2/18 A1C 7.3    Health Maintenance   Topic Date Due    Diabetic retinal exam  06/09/1972    Shingles Vaccine (1 of 2 - 2 Dose Series) 06/09/2012    Diabetic microalbuminuria test  10/28/2012    Cervical cancer screen  01/09/2021 (Originally 6/9/1983)    Lipid screen  08/14/2018    Colon Cancer Screen FIT/FOBT  08/18/2018    Flu vaccine (1) 09/01/2018    Diabetic foot exam  04/05/2019    Potassium monitoring  04/09/2019    Creatinine monitoring  04/09/2019    A1C test (Diabetic or Prediabetic)  07/02/2019    Breast cancer screen  08/30/2019    DTaP/Tdap/Td vaccine (2 - Td) 11/14/2027    Pneumococcal med risk  Completed    Hepatitis C screen  Completed    HIV screen  Completed             (applicable per patient's age: Cancer Screenings, Depression Screening, Fall Risk Screening, Immunizations)    Hemoglobin A1C (%)   Date Value   07/02/2018 7.3   04/05/2018 7.3   11/14/2017 7.5     Microalb/Crt.  Ratio (mcg/mg creat)   Date Value   10/28/2011 68     LDL Cholesterol (mg/dL)   Date Value   08/14/2017 135 (H)     AST (U/L)   Date Value   04/09/2018 17     ALT (U/L)   Date Value   04/09/2018 15     BUN (mg/dL)   Date Value   04/09/2018 22 (H)      (goal A1C is < 7)   (goal LDL is <100) need 30-50% reduction from baseline     BP Readings from Last 3 Encounters:   07/02/18 (!) 148/78   04/05/18 138/88   01/09/18 120/76    (goal /80)      All Future Testing planned in [facility]:  Lab Frequency Next Occurrence   Lipid Panel Once 12/31/2018       Next Visit Date:  Future Appointments  Date Time Provider Ignacio Barry   10/2/2018 2:00 PM Silvano Flower            Patient Active Problem List:     Type 2 diabetes mellitus without complication, without long-term current use of insulin ([address]     Mixed hyperlipidemia     Essential hypertension     Anxiety     Noncompliance with medications

## 2019-01-21 ENCOUNTER — OFFICE VISIT (OUTPATIENT)
Dept: FAMILY MEDICINE CLINIC | Age: 59
End: 2019-01-21
Payer: COMMERCIAL

## 2019-01-21 VITALS
WEIGHT: 133 LBS | BODY MASS INDEX: 23.57 KG/M2 | HEART RATE: 72 BPM | HEIGHT: 63 IN | DIASTOLIC BLOOD PRESSURE: 76 MMHG | SYSTOLIC BLOOD PRESSURE: 118 MMHG

## 2019-01-21 DIAGNOSIS — E11.9 TYPE 2 DIABETES MELLITUS WITHOUT COMPLICATION, WITHOUT LONG-TERM CURRENT USE OF INSULIN (HCC): Primary | ICD-10-CM

## 2019-01-21 DIAGNOSIS — Z12.11 SCREENING FOR COLON CANCER: ICD-10-CM

## 2019-01-21 DIAGNOSIS — E78.2 MIXED HYPERLIPIDEMIA: ICD-10-CM

## 2019-01-21 DIAGNOSIS — I10 ESSENTIAL HYPERTENSION: ICD-10-CM

## 2019-01-21 LAB — HBA1C MFR BLD: 7.7 %

## 2019-01-21 PROCEDURE — 99214 OFFICE O/P EST MOD 30 MIN: CPT | Performed by: INTERNAL MEDICINE

## 2019-01-21 PROCEDURE — 83036 HEMOGLOBIN GLYCOSYLATED A1C: CPT | Performed by: INTERNAL MEDICINE

## 2019-01-21 RX ORDER — SIMVASTATIN 40 MG
40 TABLET ORAL EVERY EVENING
Qty: 90 TABLET | Refills: 2 | Status: ON HOLD | OUTPATIENT
Start: 2019-01-21 | End: 2020-01-27 | Stop reason: HOSPADM

## 2019-01-21 ASSESSMENT — ENCOUNTER SYMPTOMS
CONSTIPATION: 0
SHORTNESS OF BREATH: 0
ORTHOPNEA: 0
ABDOMINAL PAIN: 0
NAUSEA: 1
BLURRED VISION: 1
SORE THROAT: 0
COUGH: 0
DIARRHEA: 0

## 2019-01-21 ASSESSMENT — PATIENT HEALTH QUESTIONNAIRE - PHQ9
SUM OF ALL RESPONSES TO PHQ QUESTIONS 1-9: 0
2. FEELING DOWN, DEPRESSED OR HOPELESS: 0
SUM OF ALL RESPONSES TO PHQ9 QUESTIONS 1 & 2: 0
SUM OF ALL RESPONSES TO PHQ QUESTIONS 1-9: 0
1. LITTLE INTEREST OR PLEASURE IN DOING THINGS: 0

## 2019-01-22 ENCOUNTER — HOSPITAL ENCOUNTER (OUTPATIENT)
Age: 59
Setting detail: SPECIMEN
Discharge: HOME | End: 2019-01-22
Payer: COMMERCIAL

## 2019-01-22 DIAGNOSIS — E11.9 TYPE 2 DIABETES MELLITUS WITHOUT COMPLICATION, WITHOUT LONG-TERM CURRENT USE OF INSULIN (HCC): ICD-10-CM

## 2019-01-22 PROCEDURE — 82570 ASSAY OF URINE CREATININE: CPT

## 2019-01-22 PROCEDURE — 82043 UR ALBUMIN QUANTITATIVE: CPT

## 2019-01-23 LAB
CREATININE URINE: 34.8 MG/DL (ref 28–217)
MICROALBUMIN/CREAT 24H UR: <12 MG/L
MICROALBUMIN/CREAT UR-RTO: NORMAL MCG/MG CREAT

## 2019-01-25 ENCOUNTER — HOSPITAL ENCOUNTER (OUTPATIENT)
Age: 59
Discharge: HOME | End: 2019-01-25
Payer: COMMERCIAL

## 2019-01-25 DIAGNOSIS — E11.9 TYPE 2 DIABETES MELLITUS WITHOUT COMPLICATION, WITHOUT LONG-TERM CURRENT USE OF INSULIN (HCC): ICD-10-CM

## 2019-01-25 DIAGNOSIS — E78.2 MIXED HYPERLIPIDEMIA: ICD-10-CM

## 2019-01-25 DIAGNOSIS — I10 ESSENTIAL HYPERTENSION: ICD-10-CM

## 2019-01-25 LAB
ANION GAP SERPL CALCULATED.3IONS-SCNC: 11 MMOL/L (ref 9–17)
BUN BLDV-MCNC: 26 MG/DL (ref 6–20)
BUN/CREAT BLD: 48 (ref 9–20)
CALCIUM SERPL-MCNC: 10 MG/DL (ref 8.6–10.4)
CHLORIDE BLD-SCNC: 97 MMOL/L (ref 98–107)
CHOLESTEROL/HDL RATIO: 2.2
CHOLESTEROL: 211 MG/DL
CO2: 28 MMOL/L (ref 20–31)
CREAT SERPL-MCNC: 0.54 MG/DL (ref 0.5–0.9)
GFR AFRICAN AMERICAN: >60 ML/MIN
GFR NON-AFRICAN AMERICAN: >60 ML/MIN
GFR SERPL CREATININE-BSD FRML MDRD: ABNORMAL ML/MIN/{1.73_M2}
GFR SERPL CREATININE-BSD FRML MDRD: ABNORMAL ML/MIN/{1.73_M2}
GLUCOSE BLD-MCNC: 138 MG/DL (ref 70–99)
HDLC SERPL-MCNC: 96 MG/DL
LDL CHOLESTEROL: 103 MG/DL (ref 0–130)
PATIENT FASTING?: YES
POTASSIUM SERPL-SCNC: 4.1 MMOL/L (ref 3.7–5.3)
SODIUM BLD-SCNC: 136 MMOL/L (ref 135–144)
TRIGL SERPL-MCNC: 61 MG/DL
TSH SERPL DL<=0.05 MIU/L-ACNC: 2.56 MIU/L (ref 0.3–5)
VLDLC SERPL CALC-MCNC: ABNORMAL MG/DL (ref 1–30)

## 2019-01-25 PROCEDURE — 36415 COLL VENOUS BLD VENIPUNCTURE: CPT

## 2019-01-25 PROCEDURE — 80048 BASIC METABOLIC PNL TOTAL CA: CPT

## 2019-01-25 PROCEDURE — 84443 ASSAY THYROID STIM HORMONE: CPT

## 2019-01-25 PROCEDURE — 80061 LIPID PANEL: CPT

## 2019-02-01 ENCOUNTER — TELEPHONE (OUTPATIENT)
Dept: FAMILY MEDICINE CLINIC | Age: 59
End: 2019-02-01

## 2019-02-01 DIAGNOSIS — I10 ESSENTIAL HYPERTENSION: Primary | ICD-10-CM

## 2019-02-02 RX ORDER — LOSARTAN POTASSIUM 50 MG/1
50 TABLET ORAL DAILY
Qty: 90 TABLET | Refills: 2 | Status: ON HOLD
Start: 2019-02-02 | End: 2020-07-13 | Stop reason: ALTCHOICE

## 2019-04-22 ENCOUNTER — OFFICE VISIT (OUTPATIENT)
Dept: FAMILY MEDICINE CLINIC | Age: 59
End: 2019-04-22
Payer: COMMERCIAL

## 2019-04-22 VITALS
SYSTOLIC BLOOD PRESSURE: 134 MMHG | OXYGEN SATURATION: 98 % | HEIGHT: 63 IN | HEART RATE: 66 BPM | DIASTOLIC BLOOD PRESSURE: 76 MMHG | WEIGHT: 131 LBS | BODY MASS INDEX: 23.21 KG/M2

## 2019-04-22 DIAGNOSIS — E78.2 MIXED HYPERLIPIDEMIA: ICD-10-CM

## 2019-04-22 DIAGNOSIS — Z12.11 COLON CANCER SCREENING: ICD-10-CM

## 2019-04-22 DIAGNOSIS — L30.9 ECZEMA, UNSPECIFIED TYPE: ICD-10-CM

## 2019-04-22 DIAGNOSIS — E11.9 TYPE 2 DIABETES MELLITUS WITHOUT COMPLICATION, WITHOUT LONG-TERM CURRENT USE OF INSULIN (HCC): Primary | ICD-10-CM

## 2019-04-22 DIAGNOSIS — I10 ESSENTIAL HYPERTENSION: ICD-10-CM

## 2019-04-22 LAB — HBA1C MFR BLD: 7.4 %

## 2019-04-22 PROCEDURE — 99214 OFFICE O/P EST MOD 30 MIN: CPT | Performed by: INTERNAL MEDICINE

## 2019-04-22 PROCEDURE — 83036 HEMOGLOBIN GLYCOSYLATED A1C: CPT | Performed by: INTERNAL MEDICINE

## 2019-04-22 RX ORDER — CLOBETASOL PROPIONATE 0.5 MG/G
CREAM TOPICAL DAILY
Qty: 45 G | Refills: 0 | COMMUNITY
Start: 2019-04-22

## 2019-04-22 RX ORDER — GLIMEPIRIDE 1 MG/1
1 TABLET ORAL
Qty: 90 TABLET | Refills: 1 | Status: SHIPPED | OUTPATIENT
Start: 2019-04-22 | End: 2019-06-24 | Stop reason: DRUGHIGH

## 2019-04-22 ASSESSMENT — ENCOUNTER SYMPTOMS
ABDOMINAL PAIN: 0
CHEST TIGHTNESS: 0
COUGH: 0
SORE THROAT: 0
NAUSEA: 0
SHORTNESS OF BREATH: 0

## 2019-04-22 NOTE — PATIENT INSTRUCTIONS
SURVEY:    You may be receiving a survey from [employer] regarding your visit today. Please complete the survey to enable us to provide the highest quality of care to you and your family. If you cannot score us a very good on any question, please call the office to discuss how we could of made your experience a very good one. Thank you.

## 2019-04-22 NOTE — PROGRESS NOTES
HPI Notes    Name: Laly Santos  : 1960         Chief Complaint:     Chief Complaint   Patient presents with    Hypertension     states seh is doing ok just tired alot with no energy    Diabetes     Last A1C was 7.7 on 19, stopped taking Januvia because it is to expensive. had last eye exam at eye doctor in [address] [facility], Dr. Jaja Gates       History of Present Illness:        Franko Cohen presents to office to follow up for DM, HTN and Hyperlipidemia  Says can't afford Januvia. Bought for one month only, says copay was $ 90 ,can't afford it. She also c/o rash on the right anterior shin. States it/s red, itching  Tried OTC hydrocortisone and it helped only a little bit      Diabetes   She presents for her follow-up diabetic visit. She has type 2 diabetes mellitus. Her disease course has been stable. Pertinent negatives for hypoglycemia include no dizziness, headaches or nervousness/anxiousness. Associated symptoms include fatigue. Pertinent negatives for diabetes include no chest pain. Symptoms are stable. Pertinent negatives for diabetic complications include no CVA. Risk factors for coronary artery disease include diabetes mellitus, dyslipidemia, hypertension, sedentary lifestyle and post-menopausal. Current diabetic treatment includes oral agent (monotherapy). She is compliant with treatment all of the time. Meal planning includes avoidance of concentrated sweets. An ACE inhibitor/angiotensin II receptor blocker is being taken. Eye exam is current. Hypertension   This is a chronic problem. The current episode started more than 1 year ago. The problem is unchanged. The problem is controlled. Pertinent negatives include no chest pain, headaches, palpitations or shortness of breath. There are no associated agents to hypertension. Risk factors for coronary artery disease include diabetes mellitus, dyslipidemia, family history and post-menopausal state. Past treatments include angiotensin blockers.  The current treatment provides significant improvement. There are no compliance problems. There is no history of kidney disease, CAD/MI, CVA or heart failure. Hyperlipidemia   This is a chronic problem. The current episode started more than 1 year ago. Recent lipid tests were reviewed and are variable. Exacerbating diseases include diabetes. Pertinent negatives include no chest pain, myalgias or shortness of breath. Current antihyperlipidemic treatment includes statins. There are no compliance problems. Past Medical History:     Past Medical History:   Diagnosis Date    Diabetes mellitus ([facility] [address]     Hyperlipidemia     Hypertension       Reviewed all health maintenance requirements and orderedappropriate tests  Health Maintenance Due   Topic Date Due    Hepatitis B Vaccine (1 of 3 - Risk 3-dose series) 06/09/1981    Shingles Vaccine (1 of 2) 06/09/2012    Colon Cancer Screen FIT/FOBT  08/18/2018    Diabetic retinal exam  01/19/2019    Diabetic foot exam  04/05/2019       Past Surgical History:     Past Surgical History:   Procedure Laterality Date    BREAST BIOPSY Left     HYSTERECTOMY  1986    uterine cancer        Medications:       Prior to Admission medications    Medication Sig Start Date End Date Taking? Authorizing Provider   glimepiride (AMARYL) 1 MG tablet Take 1 tablet by mouth every morning (before breakfast) 4/22/19  Yes Michelle Alatorre MD   clobetasol prop emollient base (CLOBETASOL PROPIONATE E) 0.05 % CREA Apply topically daily 4/22/19  Yes Michelle Alatorre MD   losartan (COZAAR) 50 MG tablet Take 1 tablet by mouth daily 2/2/19  Yes Michelle Alatorre MD   metFORMIN (GLUCOPHAGE) 1000 MG tablet Take 1 tablet by mouth 2 times daily (with meals) 1/21/19  Yes Michelle Alatorre MD   simvastatin (ZOCOR) 40 MG tablet Take 1 tablet by mouth every evening 1/21/19  Yes Michelle Alatorre MD   hydrocortisone 2.5 % cream Apply topically 2 times daily.  4/5/18  Yes Michelle Alatorre MD   busPIRone (BUSPAR) 15 MG tablet Take 1 tablet by mouth 3 times daily 1/9/18  Yes Zoraida Bernal MD   Glucose Blood (BLOOD GLUCOSE TEST STRIPS) STRP Please dispense strips  That are covered by her insurance and her meter  DX-E11.9  Pt tests 4 x day 10/26/17  Yes Marino Early MD   Lancets MISC Please dispense lancets to go with meter and test strips  DX E11.9 pt tests QID 10/26/17  Yes Marino Early MD   calcium carbonate (OSCAL) 500 MG TABS tablet Take 500 mg by mouth daily   Yes Historical Provider, MD   Multiple Vitamins-Minerals (THERAPEUTIC MULTIVITAMIN-MINERALS) tablet Take 1 tablet by mouth daily   Yes Historical Provider, MD   Insulin Pen Needle (MEIJER PEN NEEDLES) 31G X 6 MM MISC 1 each by Does not apply route daily 8/14/17  Yes Marino Early MD   SITagliptin (JANUVIA) 50 MG tablet Take 1 tablet by mouth daily 1/21/19   Zoraida Bernal MD   Blood Glucose Monitoring Suppl MILANA 1 Units by Does not apply route once for 1 dose DX-E11.9 10/26/17 10/26/17  Marino Early MD   naproxen sodium (ANAPROX) 550 MG tablet Take 1 tablet by mouth twice a day when necessary pain 2/7/13   Martin Esters, DO        Allergies:       Patient has no known allergies. Social History:     Tobacco: reports that she quit smoking about 20 years ago. She has a 10.00 pack-year smoking history. She has never used smokeless tobacco.  Alcohol:      reports that she does not drink alcohol. Drug Use:  reports that she does not use drugs. Family History:     Family History   Problem Relation Age of Onset    Diabetes Father        Review of Systems:         Review of Systems   Constitutional: Positive for fatigue. Negative for activity change, appetite change, chills and fever. HENT: Negative for congestion and sore throat. Respiratory: Negative for cough, chest tightness and shortness of breath. Cardiovascular: Negative for chest pain, palpitations and leg swelling. Gastrointestinal: Negative for abdominal pain and nausea. Genitourinary: Negative for dysuria. Musculoskeletal: Negative. Negative for myalgias. Skin: Positive for rash (right leg). Neurological: Negative for dizziness, syncope and headaches. Psychiatric/Behavioral: The patient is not nervous/anxious. Physical Exam:     Vitals:  /76 (Site: Left Upper Arm, Position: Sitting, Cuff Size: Medium Adult)   Pulse 66   Ht 5' 3\" (1.6 m)   Wt 131 lb (59.4 kg)   SpO2 98%   BMI 23.21 kg/m²       Physical Exam   Constitutional: She is oriented to person, place, and time. She appears well-developed and well-nourished. No distress. HENT:   Head: Normocephalic and atraumatic. Right Ear: External ear normal.   Left Ear: External ear normal.   Mouth/Throat: Oropharynx is clear and moist. No oropharyngeal exudate. Eyes: Conjunctivae are normal. Right eye exhibits no discharge. Left eye exhibits no discharge. Neck: No thyromegaly present. Cardiovascular: Normal rate, regular rhythm and normal heart sounds. No murmur heard. Pulmonary/Chest: Effort normal and breath sounds normal. She has no wheezes. She has no rales. Abdominal: Soft. Bowel sounds are normal. She exhibits no distension and no mass. There is no tenderness. Musculoskeletal: Normal range of motion. She exhibits no edema. Lymphadenopathy:     She has no cervical adenopathy. Neurological: She is alert and oriented to person, place, and time. No cranial nerve deficit. Skin: Skin is warm and dry. Rash (right leg anterior shin area with reddish discolored skin, flaky, excoriations present , no discharge) noted. Psychiatric: She has a normal mood and affect. Her behavior is normal. Judgment normal.   Vitals reviewed.       Diabetic Foot Exam    Pulses:  normal,   Edema: negative  Skin: normal    Sensory exam  Monofilament Sensation:  normal  (minimum of 5 random plantar locations tested, avoid callous areas - > 1 area with absence of sensation is + for neuropathy)      Plus one of the following  Pinprick:  Intact      Data:     Lab Results   Component Value Date     01/25/2019    K 4.1 01/25/2019    CL 97 01/25/2019    CO2 28 01/25/2019    BUN 26 01/25/2019    CREATININE 0.54 01/25/2019    GLUCOSE 138 01/25/2019    PROT 6.8 04/09/2018    LABALBU 4.2 04/09/2018    BILITOT <0.10 04/09/2018    ALKPHOS 72 04/09/2018    AST 17 04/09/2018    ALT 15 04/09/2018     Lab Results   Component Value Date    WBC 5.3 04/09/2018    RBC 4.09 04/09/2018    HGB 12.7 04/09/2018    HCT 37.8 04/09/2018    MCV 92.3 04/09/2018    MCH 31.0 04/09/2018    MCHC 33.5 04/09/2018    RDW 13.8 04/09/2018     04/09/2018    MPV NOT REPORTED 04/09/2018     Lab Results   Component Value Date    TSH 2.56 01/25/2019     Lab Results   Component Value Date    CHOL 211 01/25/2019    HDL 96 01/25/2019    LABA1C 7.4 04/22/2019          Assessment & Plan        Diagnosis Orders   1. Type 2 diabetes mellitus without complication, without long-term current use of insulin (HCC)   Needs better control. Hemoglobin A1c is 7.4% today. She is unable to afford Januvia. Will add glimepiride 2 metformin. Follow-up in 3 months   DIABETES FOOT EXAM    POCT glycosylated hemoglobin (Hb A1C)    glimepiride (AMARYL) 1 MG tablet   2. Essential hypertension  Blood pressure stable, continue on Cozaar     3. Mixed hyperlipidemia   Continued on Zocor     4. [facility] screening    [facility] (For [facility] results only)   5.  Eczema, unspecified type   Will try clobetasol cream  clobetasol prop emollient base (CLOBETASOL PROPIONATE E) 0.05 % CREA                   Completed Refills   Requested Prescriptions     Signed Prescriptions Disp Refills    glimepiride (AMARYL) 1 MG tablet 90 tablet 1     Sig: Take 1 tablet by mouth every morning (before breakfast)    clobetasol prop emollient base (CLOBETASOL PROPIONATE E) 0.05 % CREA 45 g 0     Sig: Apply topically daily     Return in about 3 months (around 7/22/2019) for HTN, hyperlipidemia, diabetes. Orders Placed This Encounter   Medications    glimepiride (AMARYL) 1 MG tablet     Sig: Take 1 tablet by mouth every morning (before breakfast)     Dispense:  90 tablet     Refill:  1    clobetasol prop emollient base (CLOBETASOL PROPIONATE E) 0.05 % CREA     Sig: Apply topically daily     Dispense:  45 g     Refill:  0     Orders Placed This Encounter   Procedures    COLOGUARD (For external results only)     This test is performed by an external laboratory and is used for result attachment only. It is required that this order requisition be faxed to: [facility] [facility] @ [phone]. See [email or web address] for further information.  POCT glycosylated hemoglobin (Hb A1C)    HM DIABETES FOOT EXAM         Patient Instructions     SURVEY:    You may be receiving a survey from [employer] regarding your visit today. Please complete the survey to enable us to provide the highest quality of care to you and your family. If you cannot score us a very good on any question, please call the office to discuss how we could of made your experience a very good one. Thank you.         Electronically signed by Vinod Giordano MD on 4/22/2019 at 2:22 PM           Completed Refills      Requested Prescriptions     Signed Prescriptions Disp Refills    glimepiride (AMARYL) 1 MG tablet 90 tablet 1     Sig: Take 1 tablet by mouth every morning (before breakfast)    clobetasol prop emollient base (CLOBETASOL PROPIONATE E) 0.05 % CREA 45 g 0     Sig: Apply topically daily

## 2019-05-17 ENCOUNTER — TELEPHONE (OUTPATIENT)
Dept: FAMILY MEDICINE CLINIC | Age: 59
End: 2019-05-17

## 2019-05-17 NOTE — TELEPHONE ENCOUNTER
Telephone Outcome: [facility]    [facility]   Topic Date Due    Hepatitis B Vaccine (1 of 3 - Risk 3-dose series) 06/09/1981    Shingles Vaccine (1 of 2) 06/09/2012    Colon Cancer Screen FIT/FOBT  08/18/2018    Diabetic retinal exam  01/19/2019    Cervical cancer screen  01/09/2021 (Originally 6/9/1983)    Breast cancer screen  08/30/2019    Flu vaccine (Season Ended) 09/01/2019    Diabetic microalbuminuria test  01/22/2020    Lipid screen  01/25/2020    Potassium monitoring  01/25/2020    Creatinine monitoring  01/25/2020    Diabetic foot exam  04/22/2020    A1C test (Diabetic or Prediabetic)  04/22/2020    DTaP/Tdap/Td vaccine (2 - Td) 11/14/2027    Pneumococcal 0-64 years Vaccine  Completed    Hepatitis C screen  Completed    HIV screen  Completed             (applicable per patient's age: Cancer Screenings, Depression Screening, Fall Risk Screening, Immunizations)    Hemoglobin A1C (%)   Date Value   04/22/2019 7.4   01/21/2019 7.7   07/02/2018 7.3     Microalb/Crt.  Ratio (mcg/mg creat)   Date Value   01/22/2019 CANNOT BE CALCULATED     LDL Cholesterol (mg/dL)   Date Value   01/25/2019 103     AST (U/L)   Date Value   04/09/2018 17     ALT (U/L)   Date Value   04/09/2018 15     BUN (mg/dL)   Date Value   01/25/2019 26 (H)      (goal A1C is < 7)   (goal LDL is <100) need 30-50% reduction from baseline     BP Readings from Last 3 Encounters:   04/22/19 134/76   01/21/19 118/76   07/02/18 (!) 148/78    (goal /80)      All Future Testing planned in [facility]:  Lab Frequency Next Occurrence   Lipid Panel Once 07/02/2019       Next Visit Date:  Future Appointments   Date Time Provider Ignacio Barry   7/22/2019  2:00 PM Kennedy Perez, Silvano Martinez            Patient Active Problem List:     Type 2 diabetes mellitus without complication, without long-term current use of insulin ([address]     Mixed hyperlipidemia     Essential hypertension Anxiety     Noncompliance with medications

## 2019-06-24 ENCOUNTER — TELEPHONE (OUTPATIENT)
Dept: FAMILY MEDICINE CLINIC | Age: 59
End: 2019-06-24

## 2019-06-24 RX ORDER — HYDROCHLOROTHIAZIDE 12.5 MG/1
12.5 CAPSULE, GELATIN COATED ORAL DAILY
Qty: 30 CAPSULE | Refills: 5 | Status: SHIPPED | OUTPATIENT
Start: 2019-06-24 | End: 2020-08-13 | Stop reason: ALTCHOICE

## 2019-06-24 RX ORDER — GLIMEPIRIDE 2 MG/1
2 TABLET ORAL
Qty: 30 TABLET | Refills: 5 | Status: SHIPPED | OUTPATIENT
Start: 2019-06-24 | End: 2020-04-06

## 2019-06-24 NOTE — TELEPHONE ENCOUNTER
Rx refill request    [facility] [place]    HCTZ 12.5 mg qd    Glimepiride 1 mg - She states her sugar is still running high and is asking if this could be increased? Health Maintenance   Topic Date Due    Hepatitis B Vaccine (1 of 3 - Risk 3-dose series) 06/09/1981    Shingles Vaccine (1 of 2) 06/09/2012    Colon Cancer Screen FIT/FOBT  08/18/2018    Diabetic retinal exam  01/19/2019    Cervical cancer screen  01/09/2021 (Originally 6/9/1983)    Breast cancer screen  08/30/2019    Flu vaccine (Season Ended) 09/01/2019    Diabetic microalbuminuria test  01/22/2020    Lipid screen  01/25/2020    Potassium monitoring  01/25/2020    Creatinine monitoring  01/25/2020    Diabetic foot exam  04/22/2020    A1C test (Diabetic or Prediabetic)  04/22/2020    DTaP/Tdap/Td vaccine (2 - Td) 11/14/2027    Pneumococcal 0-64 years Vaccine  Completed    Hepatitis C screen  Completed    HIV screen  Completed             (applicable per patient's age: Cancer Screenings, Depression Screening, Fall Risk Screening, Immunizations)    Hemoglobin A1C (%)   Date Value   04/22/2019 7.4   01/21/2019 7.7   07/02/2018 7.3     Microalb/Crt.  Ratio (mcg/mg creat)   Date Value   01/22/2019 CANNOT BE CALCULATED     LDL Cholesterol (mg/dL)   Date Value   01/25/2019 103     AST (U/L)   Date Value   04/09/2018 17     ALT (U/L)   Date Value   04/09/2018 15     BUN (mg/dL)   Date Value   01/25/2019 26 (H)      (goal A1C is < 7)   (goal LDL is <100) need 30-50% reduction from baseline     BP Readings from Last 3 Encounters:   04/22/19 134/76   01/21/19 118/76   07/02/18 (!) 148/78    (goal /80)      All Future Testing planned in [facility]:  Lab Frequency Next Occurrence   Lipid Panel Once 07/02/2019       Next Visit Date:  Future Appointments   Date Time Provider Ignacio Barry   7/22/2019  2:00 PM Michelle Alatorre MD [facility][note 3]1 [facility]            Patient Active Problem List:     Type 2 diabetes mellitus without complication, without long-term current use of insulin ([facility]     Mixed hyperlipidemia     Essential hypertension     Anxiety     Noncompliance with medications

## 2019-07-24 ENCOUNTER — TELEPHONE (OUTPATIENT)
Dept: FAMILY MEDICINE CLINIC | Age: 59
End: 2019-07-24

## 2019-09-17 DIAGNOSIS — E11.9 TYPE 2 DIABETES MELLITUS WITHOUT COMPLICATION, WITHOUT LONG-TERM CURRENT USE OF INSULIN (HCC): ICD-10-CM

## 2020-01-23 ENCOUNTER — OFFICE VISIT (OUTPATIENT)
Dept: FAMILY MEDICINE CLINIC | Age: 60
End: 2020-01-23
Payer: COMMERCIAL

## 2020-01-23 ENCOUNTER — HOSPITAL ENCOUNTER (OUTPATIENT)
Age: 60
Setting detail: OBSERVATION
Discharge: ANOTHER HOSPITAL | End: 2020-01-24
Attending: EMERGENCY MEDICINE | Admitting: INTERNAL MEDICINE
Payer: COMMERCIAL

## 2020-01-23 ENCOUNTER — APPOINTMENT (OUTPATIENT)
Dept: GENERAL RADIOLOGY | Age: 60
End: 2020-01-23
Payer: COMMERCIAL

## 2020-01-23 VITALS
BODY MASS INDEX: 26.22 KG/M2 | HEART RATE: 60 BPM | WEIGHT: 148 LBS | SYSTOLIC BLOOD PRESSURE: 130 MMHG | DIASTOLIC BLOOD PRESSURE: 70 MMHG | OXYGEN SATURATION: 98 %

## 2020-01-23 PROBLEM — R07.89 OTHER CHEST PAIN: Status: ACTIVE | Noted: 2020-01-23

## 2020-01-23 PROBLEM — R07.9 CHEST PAIN: Status: ACTIVE | Noted: 2020-01-23

## 2020-01-23 LAB
-: ABNORMAL
ABSOLUTE EOS #: 0.1 K/UL (ref 0–0.4)
ABSOLUTE IMMATURE GRANULOCYTE: NORMAL K/UL (ref 0–0.3)
ABSOLUTE LYMPH #: 1.9 K/UL (ref 1–4.8)
ABSOLUTE MONO #: 0.4 K/UL (ref 0–1)
ALBUMIN SERPL-MCNC: 4.9 G/DL (ref 3.5–5.2)
ALBUMIN/GLOBULIN RATIO: ABNORMAL (ref 1–2.5)
ALP BLD-CCNC: 87 U/L (ref 35–104)
ALT SERPL-CCNC: 9 U/L (ref 5–33)
AMORPHOUS: ABNORMAL
ANION GAP SERPL CALCULATED.3IONS-SCNC: 12 MMOL/L (ref 9–17)
AST SERPL-CCNC: 21 U/L
BACTERIA: ABNORMAL
BASOPHILS # BLD: 1 % (ref 0–2)
BASOPHILS ABSOLUTE: 0 K/UL (ref 0–0.2)
BILIRUB SERPL-MCNC: 0.19 MG/DL (ref 0.3–1.2)
BILIRUBIN URINE: NEGATIVE
BNP INTERPRETATION: NORMAL
BUN BLDV-MCNC: 19 MG/DL (ref 6–20)
BUN/CREAT BLD: 26 (ref 9–20)
CALCIUM SERPL-MCNC: 10.3 MG/DL (ref 8.6–10.4)
CASTS UA: ABNORMAL /LPF
CHLORIDE BLD-SCNC: 102 MMOL/L (ref 98–107)
CO2: 26 MMOL/L (ref 20–31)
COLOR: YELLOW
COMMENT UA: ABNORMAL
CREAT SERPL-MCNC: 0.72 MG/DL (ref 0.5–0.9)
CRYSTALS, UA: ABNORMAL /HPF
DIFFERENTIAL TYPE: YES
EOSINOPHILS RELATIVE PERCENT: 2 % (ref 0–5)
EPITHELIAL CELLS UA: ABNORMAL /HPF
GFR AFRICAN AMERICAN: >60 ML/MIN
GFR NON-AFRICAN AMERICAN: >60 ML/MIN
GFR SERPL CREATININE-BSD FRML MDRD: ABNORMAL ML/MIN/{1.73_M2}
GFR SERPL CREATININE-BSD FRML MDRD: ABNORMAL ML/MIN/{1.73_M2}
GLUCOSE BLD-MCNC: 146 MG/DL (ref 70–99)
GLUCOSE URINE: ABNORMAL
HCT VFR BLD CALC: 43.4 % (ref 36–46)
HEMOGLOBIN: 14.4 G/DL (ref 12–16)
IMMATURE GRANULOCYTES: NORMAL %
KETONES, URINE: NEGATIVE
LEUKOCYTE ESTERASE, URINE: ABNORMAL
LYMPHOCYTES # BLD: 34 % (ref 15–40)
MAGNESIUM: 2.2 MG/DL (ref 1.6–2.6)
MCH RBC QN AUTO: 30.5 PG (ref 26–34)
MCHC RBC AUTO-ENTMCNC: 33.2 G/DL (ref 31–37)
MCV RBC AUTO: 92 FL (ref 80–100)
MONOCYTES # BLD: 7 % (ref 4–8)
MUCUS: ABNORMAL
NITRITE, URINE: NEGATIVE
NRBC AUTOMATED: NORMAL PER 100 WBC
OTHER OBSERVATIONS UA: ABNORMAL
PDW BLD-RTO: 13.7 % (ref 12.1–15.2)
PH UA: 7 (ref 5–8)
PLATELET # BLD: 336 K/UL (ref 140–450)
PLATELET ESTIMATE: NORMAL
PMV BLD AUTO: NORMAL FL (ref 6–12)
POTASSIUM SERPL-SCNC: 4 MMOL/L (ref 3.7–5.3)
PRO-BNP: 21 PG/ML
PROTEIN UA: NEGATIVE
RBC # BLD: 4.72 M/UL (ref 4–5.2)
RBC # BLD: NORMAL 10*6/UL
RBC UA: ABNORMAL /HPF (ref 0–2)
RENAL EPITHELIAL, UA: ABNORMAL /HPF
SEG NEUTROPHILS: 56 % (ref 47–75)
SEGMENTED NEUTROPHILS ABSOLUTE COUNT: 3.1 K/UL (ref 2.5–7)
SODIUM BLD-SCNC: 140 MMOL/L (ref 135–144)
SPECIFIC GRAVITY UA: 1.01 (ref 1–1.03)
TOTAL PROTEIN: 8 G/DL (ref 6.4–8.3)
TRICHOMONAS: ABNORMAL
TROPONIN INTERP: NORMAL
TROPONIN T: <0.03 NG/ML
TROPONIN, HIGH SENSITIVITY: NORMAL NG/L (ref 0–14)
TSH SERPL DL<=0.05 MIU/L-ACNC: 2.22 MIU/L (ref 0.3–5)
TURBIDITY: CLEAR
URINE HGB: NEGATIVE
UROBILINOGEN, URINE: NORMAL
WBC # BLD: 5.6 K/UL (ref 3.5–11)
WBC # BLD: NORMAL 10*3/UL
WBC UA: ABNORMAL /HPF
YEAST: ABNORMAL

## 2020-01-23 PROCEDURE — 99285 EMERGENCY DEPT VISIT HI MDM: CPT

## 2020-01-23 PROCEDURE — 85025 COMPLETE CBC W/AUTO DIFF WBC: CPT

## 2020-01-23 PROCEDURE — 84443 ASSAY THYROID STIM HORMONE: CPT

## 2020-01-23 PROCEDURE — 81001 URINALYSIS AUTO W/SCOPE: CPT

## 2020-01-23 PROCEDURE — G0378 HOSPITAL OBSERVATION PER HR: HCPCS

## 2020-01-23 PROCEDURE — 6360000002 HC RX W HCPCS: Performed by: INTERNAL MEDICINE

## 2020-01-23 PROCEDURE — 93005 ELECTROCARDIOGRAM TRACING: CPT | Performed by: EMERGENCY MEDICINE

## 2020-01-23 PROCEDURE — 36415 COLL VENOUS BLD VENIPUNCTURE: CPT

## 2020-01-23 PROCEDURE — 6370000000 HC RX 637 (ALT 250 FOR IP): Performed by: EMERGENCY MEDICINE

## 2020-01-23 PROCEDURE — 87086 URINE CULTURE/COLONY COUNT: CPT

## 2020-01-23 PROCEDURE — 99213 OFFICE O/P EST LOW 20 MIN: CPT | Performed by: INTERNAL MEDICINE

## 2020-01-23 PROCEDURE — 83735 ASSAY OF MAGNESIUM: CPT

## 2020-01-23 PROCEDURE — 71046 X-RAY EXAM CHEST 2 VIEWS: CPT

## 2020-01-23 PROCEDURE — 2580000003 HC RX 258: Performed by: INTERNAL MEDICINE

## 2020-01-23 PROCEDURE — 83880 ASSAY OF NATRIURETIC PEPTIDE: CPT

## 2020-01-23 PROCEDURE — 84484 ASSAY OF TROPONIN QUANT: CPT

## 2020-01-23 PROCEDURE — 94761 N-INVAS EAR/PLS OXIMETRY MLT: CPT

## 2020-01-23 PROCEDURE — 6370000000 HC RX 637 (ALT 250 FOR IP): Performed by: INTERNAL MEDICINE

## 2020-01-23 PROCEDURE — 99244 OFF/OP CNSLTJ NEW/EST MOD 40: CPT | Performed by: INTERNAL MEDICINE

## 2020-01-23 PROCEDURE — 80053 COMPREHEN METABOLIC PANEL: CPT

## 2020-01-23 PROCEDURE — 96372 THER/PROPH/DIAG INJ SC/IM: CPT

## 2020-01-23 RX ORDER — ONDANSETRON 2 MG/ML
4 INJECTION INTRAMUSCULAR; INTRAVENOUS EVERY 6 HOURS PRN
Status: DISCONTINUED | OUTPATIENT
Start: 2020-01-23 | End: 2020-01-24 | Stop reason: HOSPADM

## 2020-01-23 RX ORDER — NITROGLYCERIN 0.4 MG/1
0.4 TABLET SUBLINGUAL EVERY 5 MIN PRN
Status: CANCELLED | OUTPATIENT
Start: 2020-01-23 | End: 2020-01-23

## 2020-01-23 RX ORDER — SODIUM CHLORIDE 0.9 % (FLUSH) 0.9 %
10 SYRINGE (ML) INJECTION EVERY 12 HOURS SCHEDULED
Status: DISCONTINUED | OUTPATIENT
Start: 2020-01-23 | End: 2020-01-24 | Stop reason: HOSPADM

## 2020-01-23 RX ORDER — M-VIT,TX,IRON,MINS/CALC/FOLIC 27MG-0.4MG
1 TABLET ORAL DAILY
Status: DISCONTINUED | OUTPATIENT
Start: 2020-01-23 | End: 2020-01-24 | Stop reason: HOSPADM

## 2020-01-23 RX ORDER — NITROGLYCERIN 0.4 MG/1
0.4 TABLET SUBLINGUAL EVERY 5 MIN PRN
Status: DISCONTINUED | OUTPATIENT
Start: 2020-01-23 | End: 2020-01-24 | Stop reason: HOSPADM

## 2020-01-23 RX ORDER — HYDROCHLOROTHIAZIDE 25 MG/1
12.5 TABLET ORAL DAILY
Status: DISCONTINUED | OUTPATIENT
Start: 2020-01-23 | End: 2020-01-24 | Stop reason: HOSPADM

## 2020-01-23 RX ORDER — SIMVASTATIN 20 MG
40 TABLET ORAL EVERY EVENING
Status: DISCONTINUED | OUTPATIENT
Start: 2020-01-23 | End: 2020-01-24 | Stop reason: HOSPADM

## 2020-01-23 RX ORDER — SODIUM CHLORIDE 0.9 % (FLUSH) 0.9 %
10 SYRINGE (ML) INJECTION PRN
Status: DISCONTINUED | OUTPATIENT
Start: 2020-01-23 | End: 2020-01-24 | Stop reason: HOSPADM

## 2020-01-23 RX ORDER — SODIUM CHLORIDE 0.9 % (FLUSH) 0.9 %
10 SYRINGE (ML) INJECTION PRN
Status: CANCELLED | OUTPATIENT
Start: 2020-01-23 | End: 2020-01-23

## 2020-01-23 RX ORDER — CALCIUM CARBONATE 500(1250)
500 TABLET ORAL DAILY
Status: DISCONTINUED | OUTPATIENT
Start: 2020-01-23 | End: 2020-01-24 | Stop reason: HOSPADM

## 2020-01-23 RX ORDER — ATROPINE SULFATE 0.1 MG/ML
0.5 INJECTION INTRAVENOUS EVERY 5 MIN PRN
Status: CANCELLED | OUTPATIENT
Start: 2020-01-23 | End: 2020-01-23

## 2020-01-23 RX ORDER — SODIUM CHLORIDE 9 MG/ML
500 INJECTION, SOLUTION INTRAVENOUS CONTINUOUS PRN
Status: CANCELLED | OUTPATIENT
Start: 2020-01-23 | End: 2020-01-23

## 2020-01-23 RX ORDER — ASPIRIN 81 MG/1
243 TABLET, CHEWABLE ORAL ONCE
Status: COMPLETED | OUTPATIENT
Start: 2020-01-23 | End: 2020-01-23

## 2020-01-23 RX ORDER — GLIMEPIRIDE 2 MG/1
2 TABLET ORAL
Status: DISCONTINUED | OUTPATIENT
Start: 2020-01-24 | End: 2020-01-24 | Stop reason: HOSPADM

## 2020-01-23 RX ORDER — ASPIRIN 81 MG/1
81 TABLET, CHEWABLE ORAL DAILY
Status: DISCONTINUED | OUTPATIENT
Start: 2020-01-24 | End: 2020-01-24 | Stop reason: HOSPADM

## 2020-01-23 RX ORDER — ALBUTEROL SULFATE 90 UG/1
2 AEROSOL, METERED RESPIRATORY (INHALATION) PRN
Status: CANCELLED | OUTPATIENT
Start: 2020-01-23 | End: 2020-01-23

## 2020-01-23 RX ORDER — AMINOPHYLLINE DIHYDRATE 25 MG/ML
50 INJECTION, SOLUTION INTRAVENOUS PRN
Status: CANCELLED | OUTPATIENT
Start: 2020-01-23 | End: 2020-01-23

## 2020-01-23 RX ORDER — METOPROLOL TARTRATE 5 MG/5ML
5 INJECTION INTRAVENOUS EVERY 5 MIN PRN
Status: CANCELLED | OUTPATIENT
Start: 2020-01-23 | End: 2020-01-23

## 2020-01-23 RX ORDER — LOSARTAN POTASSIUM 50 MG/1
50 TABLET ORAL DAILY
Status: DISCONTINUED | OUTPATIENT
Start: 2020-01-24 | End: 2020-01-24 | Stop reason: HOSPADM

## 2020-01-23 RX ADMIN — Medication 500 MG: at 15:04

## 2020-01-23 RX ADMIN — ASPIRIN 81 MG 243 MG: 81 TABLET ORAL at 15:04

## 2020-01-23 RX ADMIN — Medication 10 ML: at 21:33

## 2020-01-23 RX ADMIN — HYDROCHLOROTHIAZIDE 12.5 MG: 25 TABLET ORAL at 15:05

## 2020-01-23 RX ADMIN — METFORMIN HYDROCHLORIDE 1000 MG: 1000 TABLET, FILM COATED ORAL at 17:17

## 2020-01-23 RX ADMIN — MULTIPLE VITAMINS W/ MINERALS TAB 1 TABLET: TAB at 15:05

## 2020-01-23 RX ADMIN — ENOXAPARIN SODIUM 40 MG: 40 INJECTION SUBCUTANEOUS at 15:04

## 2020-01-23 RX ADMIN — SIMVASTATIN 40 MG: 20 TABLET, FILM COATED ORAL at 17:17

## 2020-01-23 ASSESSMENT — ENCOUNTER SYMPTOMS
RHINORRHEA: 0
VOMITING: 0
ORTHOPNEA: 0
SORE THROAT: 0
NAUSEA: 1
COUGH: 0
BACK PAIN: 0
SHORTNESS OF BREATH: 1
ABDOMINAL PAIN: 0

## 2020-01-23 ASSESSMENT — PAIN SCALES - GENERAL
PAINLEVEL_OUTOF10: 0
PAINLEVEL_OUTOF10: 3
PAINLEVEL_OUTOF10: 0

## 2020-01-23 ASSESSMENT — PATIENT HEALTH QUESTIONNAIRE - PHQ9
SUM OF ALL RESPONSES TO PHQ QUESTIONS 1-9: 0
2. FEELING DOWN, DEPRESSED OR HOPELESS: 0
SUM OF ALL RESPONSES TO PHQ QUESTIONS 1-9: 0
1. LITTLE INTEREST OR PLEASURE IN DOING THINGS: 0
SUM OF ALL RESPONSES TO PHQ9 QUESTIONS 1 & 2: 0

## 2020-01-23 ASSESSMENT — HEART SCORE: ECG: 1

## 2020-01-23 ASSESSMENT — PAIN DESCRIPTION - DESCRIPTORS: DESCRIPTORS: DULL

## 2020-01-23 ASSESSMENT — PAIN DESCRIPTION - ORIENTATION: ORIENTATION: RIGHT

## 2020-01-23 ASSESSMENT — PAIN DESCRIPTION - LOCATION: LOCATION: CHEST

## 2020-01-23 NOTE — PROGRESS NOTES
Dr. Genoveva Dubon office called for consult; Surjit Langley takes consult and state she will relay it to Dr. Enrique Matta at this time.

## 2020-01-23 NOTE — ED PROVIDER NOTES
[address] COMPLAINT    Chief Complaint   Patient presents with    Chest Pain     chest pain and sob for past 3-4 weeks. has not increased. decided she wants to be looked at. SUKHDEEP Potts is a 64 y.o. female who presentsto ED from PCP office. By car. With complaint of pain for the past 3 to 4 weeks. Onset 3 to 4 weeks. Patient complains of fatigue and chest pain off and on for the past 3 to 4 weeks. Pain-free in ED. She was seen today by her primary care provider and was referred to ED for further evaluation. Patient has been also short of breath. Patient has history of diabetes, hypertension. Patient is not a smoker.       PAST MEDICAL HISTORY    Past Medical History:   Diagnosis Date    Diabetes mellitus ([address]     Hyperlipidemia     Hypertension        SURGICAL HISTORY    Past Surgical History:   Procedure Laterality Date    BREAST BIOPSY Left     HYSTERECTOMY  1986    uterine cancer       CURRENT MEDICATIONS    Current Outpatient Rx   Medication Sig Dispense Refill    metFORMIN (GLUCOPHAGE) 1000 MG tablet TAKE 1 TABLET BY MOUTH TWICE DAILY WITH MEALS 180 tablet 1    hydrochlorothiazide (MICROZIDE) 12.5 MG capsule Take 1 capsule by mouth daily (Patient taking differently: Take 12.5 mg by mouth daily Does not take every day) 30 capsule 5    glimepiride (AMARYL) 2 MG tablet Take 1 tablet by mouth every morning (before breakfast) 30 tablet 5    losartan (COZAAR) 50 MG tablet Take 1 tablet by mouth daily (Patient taking differently: Take 50 mg by mouth daily Does not take every day) 90 tablet 2    calcium carbonate (OSCAL) 500 MG TABS tablet Take 500 mg by mouth daily      Multiple Vitamins-Minerals (THERAPEUTIC MULTIVITAMIN-MINERALS) tablet Take 1 tablet by mouth daily      clobetasol prop emollient base (CLOBETASOL PROPIONATE E) 0.05 % CREA Apply topically daily 45 g 0    simvastatin (ZOCOR) 40 MG tablet Take 1 tablet by mouth every evening (Patient taking differently: Take 40 mg by mouth every evening Does not take everyday) 90 tablet 2    hydrocortisone 2.5 % cream Apply topically 2 times daily.  3.5 g 0    Blood Glucose Monitoring Suppl MILANA 1 Units by Does not apply route once for 1 dose DX-E11.9 1 Device 0    Glucose Blood (BLOOD GLUCOSE TEST STRIPS) STRP Please dispense strips  That are covered by her insurance and her meter  DX-E11.9  Pt tests 4 x day 100 strip 5    Lancets MISC Please dispense lancets to go with meter and test strips  DX E11.9 pt tests  each 5    Insulin Pen Needle (MEIJER PEN NEEDLES) 31G X 6 MM MISC 1 each by Does not apply route daily 100 each 3       ALLERGIES    No Known Allergies    FAMILY HISTORY    Family History   Problem Relation Age of Onset    Diabetes Father        SOCIAL HISTORY    Social History     Socioeconomic History    Marital status:      Spouse name: None    Number of children: None    Years of education: None    Highest education level: None   Occupational History    None   Social Needs    Financial resource strain: None    Food insecurity:     Worry: None     Inability: None    Transportation needs:     Medical: None     Non-medical: None   Tobacco Use    Smoking status: Former Smoker     Packs/day: 0.50     Years: 20.00     Pack years: 10.00     Last attempt to quit: 10/3/1998     Years since quittin.3    Smokeless tobacco: Never Used   Substance and Sexual Activity    Alcohol use: No    Drug use: No     Types: Marijuana     Comment: as teenager    Sexual activity: None   Lifestyle    Physical activity:     Days per week: None     Minutes per session: None    Stress: None   Relationships    Social connections:     Talks on phone: None     Gets together: None     Attends Voodoo service: None     Active member of club or organization: None     Attends meetings of clubs or organizations: None     Relationship status: None    Intimate partner violence:     Fear of current or ex partner: None     Emotionally abused: None     Physically abused: None     Forced sexual activity: None   Other Topics Concern    None   Social History Narrative    None           Review of Systems:  Constitutional: Positive for generally not feeling well fatigue eyes:  Denies photophobia or discharge   HENT:  Denies sore throat or ear pain   Respiratory:  Denies cough or shortness of breath   Cardiovascular: Positive for chest pain. Exertional GI:  Denies abdominal pain, nausea, vomiting, or diarrhea   Musculoskeletal:  Denies back pain   Skin:  Denies rash   Neurologic:  Denies headache, focal weakness or sensory changes   Endocrine:  Denies polyuria or polydypsia   Lymphatic:  Denies swollen glands   Psychiatric:  Denies depression, suicidal ideation or homicidal ideation   All systems negative except as marked. PHYSICAL EXAM    VITAL SIGNS: BP (!) 128/56   Pulse 64   Temp 98.8 °F (37.1 °C) (Oral)   Resp 16   Ht 5' 3\" (1.6 m)   Wt 151 lb (68.5 kg)   SpO2 100%   BMI 26.75 kg/m²    Constitutional:  Well developed, Well nourished, No acute distress, Non-toxic appearance. HENT:  Normocephalic, Atraumatic, Bilateral external ears normal, Oropharynx moist, No oral exudates, Nose normal. Neck- Normal range of motion, No tenderness, Supple, No stridor. Eyes:  PERRL, EOMI, Conjunctiva normal, No discharge. Respiratory:  Normal breath sounds, No respiratory distress, No wheezing, No chest tenderness. Cardiovascular:  Normal heart rate, Normal rhythm, No murmurs, No rubs, No gallops. GI:  Bowel sounds normal, Soft, No tenderness, No masses, No pulsatile masses. : External genitalia appear normal, No masses or lesions. No discharge. No CVA tenderness. Musculoskeletal:  Intact distal pulses, No edema, No tenderness, No cyanosis, No clubbing. Good range of motion in all major joints. No tenderness to palpation or major deformities noted. Back- No tenderness.    Integument:  Warm, Dry, No

## 2020-01-23 NOTE — PROGRESS NOTES
HPI Notes    Name: Niesha Del Rio  : 1960         Chief Complaint:     Chief Complaint   Patient presents with    Shortness of Breath     states that it is when she is active, up and moving around, mostly when going up steps.  Chest Pain     started about a month ago. States that it is a dull pain, sometimes on the left under breast and sometimes in the middle, comes and goes.  Tingling     Has been having tingling in her hands and feet, has been going on for a couple of months.  Other     states while at work yesterday the whole left side of her body started hurting.  Health Maintenance     Is due for eye exam.       History of Present Illness:        Naheed Marquez presents to office for evaluation of CP, SOB , tingling in her feet and feet. States yesterday at work she developed pain on her left side involving her head, neck, arm but not leg. She had associated left sided CP. Was sweaty and nauseous. She states she did not tell anyone at the workplace. Waited until symptoms resolved in about 30 minutes. Did not go to ER because her co-pay is high. States feels like has no energy anymore. She has DM, HTN, Hyperlipidemia. Last time was seen in our office on 19 and did not follow up since then   states takes he DM and HTN meds. Nor a smoker. Chest Pain    This is a new problem. Episode onset: about one month. The onset quality is sudden. The problem occurs every several days. Progression since onset: becoming more often. The pain is present in the lateral region and substernal region (mostly lef side, sometimes in the middle). The pain is moderate. The quality of the pain is described as pressure (achy). The pain radiates to the left shoulder and left neck. Associated symptoms include diaphoresis, dizziness, malaise/fatigue, nausea and shortness of breath.  Pertinent negatives include no abdominal pain, back pain, cough, fever, headaches, irregular heartbeat, near-syncope, (before breakfast) 6/24/19  Yes Wilma Shaw MD   clobetasol prop emollient base (CLOBETASOL PROPIONATE E) 0.05 % CREA Apply topically daily 4/22/19  Yes Wilma Shaw MD   losartan (COZAAR) 50 MG tablet Take 1 tablet by mouth daily  Patient taking differently: Take 50 mg by mouth daily Does not take every day 2/2/19  Yes Wilma Shaw MD   simvastatin (ZOCOR) 40 MG tablet Take 1 tablet by mouth every evening  Patient taking differently: Take 40 mg by mouth every evening Does not take everyday 1/21/19  Yes Wilma Shaw MD   hydrocortisone 2.5 % cream Apply topically 2 times daily. 4/5/18  Yes Wilma Shaw MD   Glucose Blood (BLOOD GLUCOSE TEST STRIPS) STRP Please dispense strips  That are covered by her insurance and her meter  DX-E11.9  Pt tests 4 x day 10/26/17  Yes Pat Montez MD   Lancets MISC Please dispense lancets to go with meter and test strips  DX E11.9 pt tests QID 10/26/17  Yes Pat Montez MD   calcium carbonate (OSCAL) 500 MG TABS tablet Take 500 mg by mouth daily   Yes Historical Provider, MD   Multiple Vitamins-Minerals (THERAPEUTIC MULTIVITAMIN-MINERALS) tablet Take 1 tablet by mouth daily   Yes Historical Provider, MD   Insulin Pen Needle (MEIJER PEN NEEDLES) 31G X 6 MM MISC 1 each by Does not apply route daily 8/14/17  Yes Pat Montez MD   naproxen sodium (ANAPROX) 550 MG tablet Take 1 tablet by mouth twice a day when necessary pain 2/7/13  Yes Minoo Perez,    SITagliptin (JANUVIA) 50 MG tablet Take 1 tablet by mouth daily 1/21/19   Wilma Shaw MD   busPIRone (BUSPAR) 15 MG tablet Take 1 tablet by mouth 3 times daily 1/9/18   Wilma Shaw MD   Blood Glucose Monitoring Suppl MILANA 1 Units by Does not apply route once for 1 dose DX-E11.9 10/26/17 10/26/17  Pat Montez MD        Allergies:       Patient has no known allergies. Social History:     Tobacco: reports that she quit smoking about 21 years ago.  She has a 10.00 pack-year smoking history. She has never used smokeless tobacco.  Alcohol:      reports no history of alcohol use. Drug Use:  reports no history of drug use. Family History:     Family History   Problem Relation Age of Onset    Diabetes Father        Review of Systems:         Review of Systems   Constitutional: Positive for activity change, diaphoresis, fatigue and malaise/fatigue. Negative for appetite change, chills, fever and unexpected weight change. HENT: Negative for congestion, rhinorrhea and sore throat. Respiratory: Positive for shortness of breath. Negative for cough. Cardiovascular: Positive for chest pain. Negative for palpitations, orthopnea and near-syncope. Gastrointestinal: Positive for nausea. Negative for abdominal pain and vomiting. Genitourinary: Negative for dysuria. Musculoskeletal: Negative for back pain. Skin: Negative for rash. Neurological: Positive for dizziness. Negative for tremors, syncope and headaches. Psychiatric/Behavioral: The patient is not nervous/anxious. Physical Exam:     Vitals:  /70 (Site: Left Upper Arm, Position: Sitting, Cuff Size: Medium Adult)   Pulse 60   Wt 148 lb (67.1 kg)   SpO2 98%   BMI 26.22 kg/m²       Physical Exam  Vitals signs reviewed. Constitutional:       General: She is not in acute distress. Appearance: She is well-developed. HENT:      Head: Normocephalic and atraumatic. Right Ear: Tympanic membrane, ear canal and external ear normal. There is no impacted cerumen. Left Ear: Tympanic membrane, ear canal and external ear normal. There is no impacted cerumen. Nose: No congestion. Mouth/Throat:      Pharynx: No oropharyngeal exudate or posterior oropharyngeal erythema. Eyes:      General: No scleral icterus. Right eye: No discharge. Left eye: No discharge. Neck:      Thyroid: No thyromegaly. Cardiovascular:      Rate and Rhythm: Normal rate and regular rhythm.       Heart sounds: pain since she has multiple risk factors for CAD. Patient is agreeable to go to emergency room. Contacted  and discussed the case with him on the phone             Completed Refills   Requested Prescriptions      No prescriptions requested or ordered in this encounter     No follow-ups on file. No orders of the defined types were placed in this encounter. No orders of the defined types were placed in this encounter. Patient Instructions   Survey: You may be receiving a survey from [facility] regarding your visit today. You may get this in the mail, through your MyChart or in your email. Please complete the survey to enable us to provide the highest quality of care to you and your family. If you cannot score us as very good ( 5 Stars) on any question, please feel free to call the office to discuss how we could have made your experience exceptional.     Thank You!       MD Melina Romo [address]        Electronically signed by Yusra Pabon MD on 1/23/2020 at 10:43 AM           Completed Refills      Requested Prescriptions      No prescriptions requested or ordered in this encounter

## 2020-01-23 NOTE — PROGRESS NOTES
Cardiology    1. Chest pain and SOB for 3 weeks consistent with angina  2. NIDDM  3. Htn  4. Hyperlipidemia    She has been having chest pain with sob and loss of energy for 3 weeks. Agree with Dr. Johnie Moncada very consistent with angina. Will do Lexiscan and echo in AM.  Further recommendations based on above tests.     Thanks, Jan Plascencia MD

## 2020-01-24 ENCOUNTER — APPOINTMENT (OUTPATIENT)
Dept: NUCLEAR MEDICINE | Age: 60
End: 2020-01-24
Payer: COMMERCIAL

## 2020-01-24 ENCOUNTER — HOSPITAL ENCOUNTER (INPATIENT)
Age: 60
LOS: 4 days | Discharge: HOME | DRG: 286 | End: 2020-01-28
Attending: INTERNAL MEDICINE | Admitting: INTERNAL MEDICINE
Payer: COMMERCIAL

## 2020-01-24 VITALS
DIASTOLIC BLOOD PRESSURE: 68 MMHG | BODY MASS INDEX: 26.01 KG/M2 | RESPIRATION RATE: 16 BRPM | HEART RATE: 60 BPM | TEMPERATURE: 97.9 F | SYSTOLIC BLOOD PRESSURE: 138 MMHG | OXYGEN SATURATION: 97 % | WEIGHT: 146.8 LBS | HEIGHT: 63 IN

## 2020-01-24 PROBLEM — I20.0 UNSTABLE ANGINA (HCC): Status: ACTIVE | Noted: 2020-01-24

## 2020-01-24 LAB
CHOLESTEROL/HDL RATIO: 2.9
CHOLESTEROL: 259 MG/DL
CULTURE: NORMAL
EKG ATRIAL RATE: 65 BPM
EKG ATRIAL RATE: 71 BPM
EKG P AXIS: 64 DEGREES
EKG P AXIS: 67 DEGREES
EKG P-R INTERVAL: 136 MS
EKG P-R INTERVAL: 146 MS
EKG Q-T INTERVAL: 398 MS
EKG Q-T INTERVAL: 408 MS
EKG QRS DURATION: 80 MS
EKG QRS DURATION: 80 MS
EKG QTC CALCULATION (BAZETT): 424 MS
EKG QTC CALCULATION (BAZETT): 432 MS
EKG R AXIS: 43 DEGREES
EKG R AXIS: 68 DEGREES
EKG T AXIS: 15 DEGREES
EKG T AXIS: 37 DEGREES
EKG VENTRICULAR RATE: 65 BPM
EKG VENTRICULAR RATE: 71 BPM
ESTIMATED AVERAGE GLUCOSE: 180 MG/DL
GLUCOSE BLD-MCNC: 144 MG/DL (ref 65–99)
GLUCOSE BLD-MCNC: 156 MG/DL (ref 65–99)
GLUCOSE BLD-MCNC: 199 MG/DL (ref 65–99)
HBA1C MFR BLD: 7.9 % (ref 4.8–5.9)
HCT VFR BLD CALC: 42.8 % (ref 36–46)
HDLC SERPL-MCNC: 89 MG/DL
HEMOGLOBIN: 14.2 G/DL (ref 12–16)
LDL CHOLESTEROL: 152 MG/DL (ref 0–130)
LV EF: 60 %
LVEF MODALITY: NORMAL
Lab: NORMAL
MCH RBC QN AUTO: 30.7 PG (ref 26–34)
MCHC RBC AUTO-ENTMCNC: 33.2 G/DL (ref 31–37)
MCV RBC AUTO: 92.3 FL (ref 80–100)
NRBC AUTOMATED: NORMAL PER 100 WBC
PDW BLD-RTO: 13.7 % (ref 12.1–15.2)
PLATELET # BLD: 294 K/UL (ref 140–450)
PMV BLD AUTO: NORMAL FL (ref 6–12)
RBC # BLD: 4.63 M/UL (ref 4–5.2)
SPECIMEN DESCRIPTION: NORMAL
TRIGL SERPL-MCNC: 89 MG/DL
TROPONIN INTERP: NORMAL
TROPONIN INTERP: NORMAL
TROPONIN T: NORMAL NG/ML
TROPONIN T: NORMAL NG/ML
TROPONIN, HIGH SENSITIVITY: <6 NG/L (ref 0–14)
TROPONIN, HIGH SENSITIVITY: <6 NG/L (ref 0–14)
VLDLC SERPL CALC-MCNC: ABNORMAL MG/DL (ref 1–30)
WBC # BLD: 4.1 K/UL (ref 3.5–11)

## 2020-01-24 PROCEDURE — 94761 N-INVAS EAR/PLS OXIMETRY MLT: CPT

## 2020-01-24 PROCEDURE — 6360000002 HC RX W HCPCS: Performed by: INTERNAL MEDICINE

## 2020-01-24 PROCEDURE — 85027 COMPLETE CBC AUTOMATED: CPT

## 2020-01-24 PROCEDURE — A9500 TC99M SESTAMIBI: HCPCS | Performed by: INTERNAL MEDICINE

## 2020-01-24 PROCEDURE — G0378 HOSPITAL OBSERVATION PER HR: HCPCS

## 2020-01-24 PROCEDURE — 80061 LIPID PANEL: CPT

## 2020-01-24 PROCEDURE — 93005 ELECTROCARDIOGRAM TRACING: CPT | Performed by: INTERNAL MEDICINE

## 2020-01-24 PROCEDURE — 93306 TTE W/DOPPLER COMPLETE: CPT

## 2020-01-24 PROCEDURE — 93010 ELECTROCARDIOGRAM REPORT: CPT | Performed by: INTERNAL MEDICINE

## 2020-01-24 PROCEDURE — 36415 COLL VENOUS BLD VENIPUNCTURE: CPT

## 2020-01-24 PROCEDURE — 3430000000 HC RX DIAGNOSTIC RADIOPHARMACEUTICAL: Performed by: INTERNAL MEDICINE

## 2020-01-24 PROCEDURE — 6370000000 HC RX 637 (ALT 250 FOR IP): Performed by: NURSE PRACTITIONER

## 2020-01-24 PROCEDURE — 82947 ASSAY GLUCOSE BLOOD QUANT: CPT

## 2020-01-24 PROCEDURE — 96372 THER/PROPH/DIAG INJ SC/IM: CPT

## 2020-01-24 PROCEDURE — 78452 HT MUSCLE IMAGE SPECT MULT: CPT

## 2020-01-24 PROCEDURE — 2060000000 HC ICU INTERMEDIATE R&B

## 2020-01-24 PROCEDURE — 96375 TX/PRO/DX INJ NEW DRUG ADDON: CPT

## 2020-01-24 PROCEDURE — 2580000003 HC RX 258: Performed by: NURSE PRACTITIONER

## 2020-01-24 PROCEDURE — 2580000003 HC RX 258: Performed by: INTERNAL MEDICINE

## 2020-01-24 PROCEDURE — 93017 CV STRESS TEST TRACING ONLY: CPT

## 2020-01-24 PROCEDURE — 6370000000 HC RX 637 (ALT 250 FOR IP): Performed by: INTERNAL MEDICINE

## 2020-01-24 PROCEDURE — 84484 ASSAY OF TROPONIN QUANT: CPT

## 2020-01-24 PROCEDURE — 83036 HEMOGLOBIN GLYCOSYLATED A1C: CPT

## 2020-01-24 PROCEDURE — 99223 1ST HOSP IP/OBS HIGH 75: CPT | Performed by: NURSE PRACTITIONER

## 2020-01-24 PROCEDURE — 96374 THER/PROPH/DIAG INJ IV PUSH: CPT

## 2020-01-24 RX ORDER — DEXTROSE MONOHYDRATE 50 MG/ML
100 INJECTION, SOLUTION INTRAVENOUS PRN
Status: DISCONTINUED | OUTPATIENT
Start: 2020-01-24 | End: 2020-01-24 | Stop reason: HOSPADM

## 2020-01-24 RX ORDER — ACETAMINOPHEN 325 MG/1
650 TABLET ORAL EVERY 4 HOURS PRN
Status: DISCONTINUED | OUTPATIENT
Start: 2020-01-24 | End: 2020-01-28 | Stop reason: HOSPADM

## 2020-01-24 RX ORDER — POTASSIUM CHLORIDE 7.45 MG/ML
10 INJECTION INTRAVENOUS PRN
Status: DISCONTINUED | OUTPATIENT
Start: 2020-01-24 | End: 2020-01-28 | Stop reason: HOSPADM

## 2020-01-24 RX ORDER — NICOTINE POLACRILEX 4 MG
15 LOZENGE BUCCAL PRN
Status: DISCONTINUED | OUTPATIENT
Start: 2020-01-24 | End: 2020-01-24 | Stop reason: HOSPADM

## 2020-01-24 RX ORDER — SODIUM CHLORIDE 0.9 % (FLUSH) 0.9 %
10 SYRINGE (ML) INJECTION PRN
Status: DISCONTINUED | OUTPATIENT
Start: 2020-01-24 | End: 2020-01-28 | Stop reason: HOSPADM

## 2020-01-24 RX ORDER — AMINOPHYLLINE DIHYDRATE 25 MG/ML
50 INJECTION, SOLUTION INTRAVENOUS PRN
Status: DISPENSED | OUTPATIENT
Start: 2020-01-24 | End: 2020-01-24

## 2020-01-24 RX ORDER — HYDROCHLOROTHIAZIDE 12.5 MG/1
12.5 CAPSULE, GELATIN COATED ORAL DAILY
Status: DISCONTINUED | OUTPATIENT
Start: 2020-01-24 | End: 2020-01-28 | Stop reason: HOSPADM

## 2020-01-24 RX ORDER — NITROGLYCERIN 0.4 MG/1
0.4 TABLET SUBLINGUAL EVERY 5 MIN PRN
Status: DISCONTINUED | OUTPATIENT
Start: 2020-01-24 | End: 2020-01-28 | Stop reason: HOSPADM

## 2020-01-24 RX ORDER — SODIUM CHLORIDE 0.9 % (FLUSH) 0.9 %
10 SYRINGE (ML) INJECTION EVERY 12 HOURS SCHEDULED
Status: DISCONTINUED | OUTPATIENT
Start: 2020-01-24 | End: 2020-01-28 | Stop reason: HOSPADM

## 2020-01-24 RX ORDER — AMINOPHYLLINE DIHYDRATE 25 MG/ML
50 INJECTION, SOLUTION INTRAVENOUS PRN
Status: DISCONTINUED | OUTPATIENT
Start: 2020-01-24 | End: 2020-01-24 | Stop reason: CLARIF

## 2020-01-24 RX ORDER — DEXTROSE MONOHYDRATE 25 G/50ML
12.5 INJECTION, SOLUTION INTRAVENOUS PRN
Status: DISCONTINUED | OUTPATIENT
Start: 2020-01-24 | End: 2020-01-24 | Stop reason: HOSPADM

## 2020-01-24 RX ORDER — SIMVASTATIN 40 MG
40 TABLET ORAL EVERY EVENING
Status: DISCONTINUED | OUTPATIENT
Start: 2020-01-24 | End: 2020-01-24

## 2020-01-24 RX ORDER — ONDANSETRON 2 MG/ML
4 INJECTION INTRAMUSCULAR; INTRAVENOUS EVERY 6 HOURS PRN
Status: DISCONTINUED | OUTPATIENT
Start: 2020-01-24 | End: 2020-01-28 | Stop reason: HOSPADM

## 2020-01-24 RX ORDER — MAGNESIUM SULFATE 1 G/100ML
1 INJECTION INTRAVENOUS PRN
Status: DISCONTINUED | OUTPATIENT
Start: 2020-01-24 | End: 2020-01-28 | Stop reason: HOSPADM

## 2020-01-24 RX ORDER — ISOSORBIDE MONONITRATE 30 MG/1
30 TABLET, EXTENDED RELEASE ORAL DAILY
Status: DISCONTINUED | OUTPATIENT
Start: 2020-01-24 | End: 2020-01-24 | Stop reason: HOSPADM

## 2020-01-24 RX ORDER — POTASSIUM CHLORIDE 20 MEQ/1
40 TABLET, EXTENDED RELEASE ORAL PRN
Status: DISCONTINUED | OUTPATIENT
Start: 2020-01-24 | End: 2020-01-28 | Stop reason: HOSPADM

## 2020-01-24 RX ORDER — ATORVASTATIN CALCIUM 40 MG/1
40 TABLET, FILM COATED ORAL NIGHTLY
Status: DISCONTINUED | OUTPATIENT
Start: 2020-01-24 | End: 2020-01-28 | Stop reason: HOSPADM

## 2020-01-24 RX ORDER — LOSARTAN POTASSIUM 50 MG/1
50 TABLET ORAL DAILY
Status: DISCONTINUED | OUTPATIENT
Start: 2020-01-24 | End: 2020-01-28 | Stop reason: HOSPADM

## 2020-01-24 RX ORDER — FAMOTIDINE 20 MG/1
20 TABLET, FILM COATED ORAL 2 TIMES DAILY
Status: DISCONTINUED | OUTPATIENT
Start: 2020-01-24 | End: 2020-01-28 | Stop reason: HOSPADM

## 2020-01-24 RX ORDER — ASPIRIN 81 MG/1
81 TABLET ORAL DAILY
Status: DISCONTINUED | OUTPATIENT
Start: 2020-01-25 | End: 2020-01-28 | Stop reason: HOSPADM

## 2020-01-24 RX ORDER — M-VIT,TX,IRON,MINS/CALC/FOLIC 27MG-0.4MG
1 TABLET ORAL DAILY
Status: DISCONTINUED | OUTPATIENT
Start: 2020-01-24 | End: 2020-01-28 | Stop reason: HOSPADM

## 2020-01-24 RX ORDER — GLIMEPIRIDE 2 MG/1
2 TABLET ORAL
Status: DISCONTINUED | OUTPATIENT
Start: 2020-01-25 | End: 2020-01-25

## 2020-01-24 RX ORDER — CALCIUM CARBONATE 200(500)MG
500 TABLET,CHEWABLE ORAL DAILY
Status: DISCONTINUED | OUTPATIENT
Start: 2020-01-24 | End: 2020-01-28 | Stop reason: HOSPADM

## 2020-01-24 RX ADMIN — ENOXAPARIN SODIUM 40 MG: 40 INJECTION SUBCUTANEOUS at 13:24

## 2020-01-24 RX ADMIN — REGADENOSON 0.4 MG: 0.08 INJECTION, SOLUTION INTRAVENOUS at 08:21

## 2020-01-24 RX ADMIN — ISOSORBIDE MONONITRATE 30 MG: 30 TABLET, EXTENDED RELEASE ORAL at 13:24

## 2020-01-24 RX ADMIN — TETRAKIS(2-METHOXYISOBUTYLISOCYANIDE)COPPER(I) TETRAFLUOROBORATE 30 MILLICURIE: 1 INJECTION, POWDER, LYOPHILIZED, FOR SOLUTION INTRAVENOUS at 07:53

## 2020-01-24 RX ADMIN — CALCIUM CARBONATE (ANTACID) CHEW TAB 500 MG 500 MG: 500 CHEW TAB at 21:09

## 2020-01-24 RX ADMIN — TETRAKIS(2-METHOXYISOBUTYLISOCYANIDE)COPPER(I) TETRAFLUOROBORATE 10 MILLICURIE: 1 INJECTION, POWDER, LYOPHILIZED, FOR SOLUTION INTRAVENOUS at 07:10

## 2020-01-24 RX ADMIN — ONDANSETRON 4 MG: 2 INJECTION INTRAMUSCULAR; INTRAVENOUS at 08:59

## 2020-01-24 RX ADMIN — METOPROLOL TARTRATE 25 MG: 25 TABLET ORAL at 21:09

## 2020-01-24 RX ADMIN — SODIUM CHLORIDE, PRESERVATIVE FREE 10 ML: 5 INJECTION INTRAVENOUS at 21:15

## 2020-01-24 RX ADMIN — FAMOTIDINE 20 MG: 20 TABLET, FILM COATED ORAL at 21:09

## 2020-01-24 RX ADMIN — AMINOPHYLLINE 75 MG: 25 INJECTION, SOLUTION INTRAVENOUS at 08:32

## 2020-01-24 RX ADMIN — MULTIPLE VITAMINS W/ MINERALS TAB 1 TABLET: TAB at 21:09

## 2020-01-24 RX ADMIN — Medication 10 ML: at 09:00

## 2020-01-24 RX ADMIN — ACETAMINOPHEN 650 MG: 325 TABLET ORAL at 21:09

## 2020-01-24 RX ADMIN — DESMOPRESSIN ACETATE 40 MG: 0.2 TABLET ORAL at 22:21

## 2020-01-24 ASSESSMENT — PAIN SCALES - GENERAL
PAINLEVEL_OUTOF10: 0
PAINLEVEL_OUTOF10: 8

## 2020-01-24 NOTE — PROGRESS NOTES
Quality flow rounds held on 1/24/20     Solitario Ervin is admitted for  Other chest pain. Length of stay 0. Education:    Needed Education: disease process, meds, follow up, diet      Do you have any questions regarding your plan of care while at the hospital? denies    Planned Disposition:               [x]  Home when able                [] Swing Bed                [] ECF/SNF               [] Other/TBD    Barriers to Discharge:    Can you afford your medications? yes   Do you have transportation to follow up appointments? Drives self   Do you need any new equipment at home? none   Current equipment includes   glucometer    Do you have a living will or durable power of  for healthcare? no               If yes do we have a copy on file? n/a    Do you or your family have any questions or concerns we haven't already discussed? Guero Maldonado and writer present for rounding.

## 2020-01-24 NOTE — LETTER
[facility] [address]  [address] [facility]  Phone: [phone]    No name on file. January 28, 2020     Patient: Virgie Donovan   YOB: 1960   Date of Visit: 1/24/2020       To Whom It May Concern: It is my medical opinion that Lorena Moralez should remain out of work until 2/3/2020. Patient was treated at our facility from 1/24/2020 to 1/28/2020. If you have any questions or concerns, please don't hesitate to call.     Sincerely,            Dexter Sanchez MD      Department of Internal Medicine  [facility]         1/28/2020, 2:17 PM

## 2020-01-24 NOTE — H&P
History & Physical    Patient:  Freddy Torres  YOB: 1960  Date of Service: 1/24/2020  MRN: [id number]   Acct:   [de-identified]   Primary Care Physician: Casie Uriostegui MD    Chief Complaint:   Chief Complaint   Patient presents with    Chest Pain     chest pain and sob for past 3-4 weeks. has not increased. decided she wants to be looked at. History of Present Illness: The patient is a 64 y.o. female presented to the emergency room for evaluation of intermittent chest pain and shortness of breath for the past 3 to 4 weeks. She was initially seen in our office for above-mentioned complaints. She states that she develops episodes of chest pain located in the left upper chest and sometimes midsternal area. Pain feels like \"achy,5-6/10 in intensity. Sometimes it radiates to her neck and left shoulder. Sometimes she has associated diaphoresis, dizziness, nausea. She also has been having episodes of shortness of breath mainly with exertion. States when he climbs the steps has to stop after couple of steps to catch her breath. Denies cough or wheezing. She started taking baby aspirin and believes it has been helping with her symptoms. She has no history of cardiac problems. She is not a smoker. She does have a history of diabetes, hypertension and hyperlipidemia. Reports no history of heart disease in the family. Work-up in the emergency room revealed stable vitals. BMP was essentially normal, calcium was 10.3, troponin was less than 0.03. LFTs unremarkable, CBC was normal, chest x-ray was negative. EKG revealed normal sinus rhythm, nonspecific ST segment abnormalities. The patient was deemed appropriate for admission to further evaluate chest pain and shortness of breath due to multiple risk factors for coronary artery disease.     Past Medical History:        Diagnosis Date    Diabetes mellitus ([address]     Hyperlipidemia     Hypertension        Past Surgical History: 21 years ago. She has a 10.00 pack-year smoking history. She has never used smokeless tobacco. She reports that she does not drink alcohol or use drugs. Family History:       Problem Relation Age of Onset    Diabetes Father        Review of systems:  Constitutional: no fever, no night sweats, positive fatigue  Head: no headache, no head injury, no migranes. Eye: no blurring of vision, no double vision. Ears: no hearing difficulty, no tinnitus  Mouth/throat  No sore throat,no dysphagia  Lungs: no cough,positive shortness of breath, no wheeze  CVS: no palpitation, positive chest pain  GI: no abdominal pain, positive for nausea , no vomiting, no constipation  JORGE: no dysuria, frequency and urgency  Musculoskeletal: no joint pain, swelling , stiffness  Endocrine: no polyuria, polydypsia, no cold or heat intolerence  Hematology: no anemia, no easy brusing or bleeding, no hx of clotting disorder  Dermatology: no skin rash  Psychiatry: no depression, positive history of anxiety  Neurology: no syncope, no seizures, no numbness or tingling of hands, no numbness or tingling of feet, no paresis       Vitals:   Vitals:    01/24/20 0413   BP: 124/67   Pulse: 55   Resp: 16   Temp: 97.7 °F (36.5 °C)   SpO2: 97%      BMI: Body mass index is 26 kg/m².     Physical Exam:  General Appearance: alert and oriented to person, place and time, in no acute distress  Cardiovascular: normal rate, regular rhythm, normal S1 and S2, no murmurs, rubs, clicks, or gallops, distal pulses intact  Pulmonary/Chest: clear to auscultation bilaterally- no wheezes, rales or rhonchi, normal air movement, no respiratory distress  Abdomen: soft, non-tender, non-distended, normal bowel sounds, no masses Extremities: no cyanosis, clubbing or edema, pedal pulses 2+ bilaterally  Skin: warm and dry, no rash or erythema  Head: normocephalic and atraumatic  Eyes: pupils equal, round, and reactive to light  Neck: supple and non-tender without mass, no thyromegaly Phosphatase 87 35 - 104 U/L    ALT 9 5 - 33 U/L    AST 21 <32 U/L    Total Bilirubin 0.19 (L) 0.30 - 1.20 mg/dL    Total Protein 8.0 6.4 - 8.3 g/dL    Alb 4.9 3.5 - 5.2 g/dL    Albumin/Globulin Ratio NOT REPORTED 1.0 - 2.5    GFR Non-African American >60 >60 mL/min    GFR African American >60 >60 mL/min    GFR Comment          GFR Staging NOT REPORTED    Troponin    Collection Time: 01/23/20 11:23 AM   Result Value Ref Range    Troponin, High Sensitivity NOT REPORTED 0 - 14 ng/L    Troponin T <0.03 <0.03 ng/mL    Troponin Interp         TSH without Reflex    Collection Time: 01/23/20 11:23 AM   Result Value Ref Range    TSH 2.22 0.30 - 5.00 mIU/L   Magnesium    Collection Time: 01/23/20 11:23 AM   Result Value Ref Range    Magnesium 2.2 1.6 - 2.6 mg/dL   Brain Natriuretic Peptide    Collection Time: 01/23/20 11:23 AM   Result Value Ref Range    Pro-BNP 21 <300 pg/mL    BNP Interpretation Pro-BNP Reference Range:    Urinalysis    Collection Time: 01/23/20 11:30 AM   Result Value Ref Range    Color, UA YELLOW YELLOW    Turbidity UA CLEAR CLEAR    Glucose, Ur 1000 mg/dL (A) NEGATIVE    Bilirubin Urine NEGATIVE NEGATIVE    Ketones, Urine NEGATIVE NEGATIVE    Specific Gravity, UA 1.010 1.005 - 1.030    Urine Hgb NEGATIVE NEGATIVE    pH, UA 7.0 5.0 - 8.0    Protein, UA NEGATIVE NEGATIVE    Urobilinogen, Urine Normal Normal    Nitrite, Urine NEGATIVE NEGATIVE    Leukocyte Esterase, Urine 2+ (A) NEGATIVE    Urinalysis Comments         Microscopic Urinalysis    Collection Time: 01/23/20 11:30 AM   Result Value Ref Range    -          WBC, UA 5 TO 10 0 /HPF    RBC, UA NOT REPORTED 0 - 2 /HPF    Casts UA NOT REPORTED /LPF    Crystals UA NOT REPORTED None /HPF    Epithelial Cells UA 5 TO 10 /HPF    Renal Epithelial, Urine NOT REPORTED 0 /HPF    Bacteria, UA 1+ (A) None    Mucus, UA NOT REPORTED None    Trichomonas, UA NOT REPORTED None    Amorphous, UA NOT REPORTED None    Other Observations UA NOT REPORTED NOT REQ.     Yeast, UA NOT REPORTED None   Troponin    Collection Time: 01/23/20  2:45 PM   Result Value Ref Range    Troponin, High Sensitivity NOT REPORTED 0 - 14 ng/L    Troponin T <0.03 <0.03 ng/mL    Troponin Interp         Troponin    Collection Time: 01/23/20  5:45 PM   Result Value Ref Range    Troponin, High Sensitivity NOT REPORTED 0 - 14 ng/L    Troponin T <0.03 <0.03 ng/mL    Troponin Interp             Radiology:     Xr Chest Standard (2 Vw)    Result Date: 1/23/2020  EXAM: XR CHEST (2 VW) REASON FOR EXAM: Chest pain, shortness of breath. COMPARISON: None. TECHNIQUE: 2 views chest. FINDINGS: Heart size normal. Lungs clear. Bony thorax and upper abdomen normal.     Negative chest.           Assessment/ Plan:    1. Chest pain -patient admitted to telemetry bed for observation and further work-up due to multiple risk factors for coronary artery disease. Her initial work-up is negative. Repeat troponin levels still negative. Will consult Dr. Morgan Mckeon for recommendations. 2D echo and Lexiscan ordered and pending  Continue on aspirin, statin, beta-blocker  2. Diabetes mellitus type 2, non-insulin-dependent -resume home medications, will check hemoglobin A1c  3. Hypertension -blood pressure is well controlled, continue on Cozaar, metoprolol  4. Hyperlipidemia -on Zocor, will check lipid panel  5. History of noncompliance with medications         Medical Necessity: Inpatient admission is appropriate for this patient secondary to the need of      Estimated length of stay: 1 days. The beneficiary may reasonably be expected to be discharged or transferred to a hospital within 96 hours after admission.     DVT prophylaxis:   [x] Lovenox   [] SCDs   [] SQ Heparin   [] Encourage ambulation, low risk for DVT, no chemical or mechanical    prophylaxis necessary      [] Already on Anticoagulation    Anticipated Disposition upon discharge:   [x] Home   [] Home with Home Health   [] [facility]   [] [address] [place] [address]

## 2020-01-24 NOTE — LETTER
[facility] [address]  [facility]  [phone]        January 27, 2020     Patient: Freddy Torres   YOB: 1960   Date of Visit: 1/24/2020       To Whom It May Concern:    Mrs. Jaron Iqbal was admitted to the hospital from 1/24/2020 to 1/27/2020. It is my medical opinion that Jaron Iqbal may return to work on 02/02/2020. If you have any questions or concerns, please don't hesitate to call.     Sincerely,        Hema Jj, [address] [facility]

## 2020-01-24 NOTE — PROGRESS NOTES
Pt nauseated and unable to take morning medications; pt refuses pills. Zofran PRN provided as ordered; see eMAR for administration.

## 2020-01-24 NOTE — PROGRESS NOTES
Nutrition Assessment    Type and Reason for Visit: Initial    Nutrition Recommendations:   1. Continue NPO diet. 2. Progress to CC 3 carbs per meal, 2 gm sodium diet when medically appropriate. Nutrition Assessment: Altered nutrition related labs r/t cardiac and endocrine dysfunction aeb cholesterol 259, , A1c 7.9. Pt admitted with chest pain. NPO for stress test. Pt would benefit from comprehensive diabetes education and CHIP class for reduction of lipid and glycemic levels. Pt denied any PO or weight changes. Provided heart healthy mediterranean diet and CHIP brochure. Enc CC and provided sample mealplan. Malnutrition Assessment:  · Malnutrition Status: No malnutrition  · Context: Acute illness or injury  · Findings of the 6 clinical characteristics of malnutrition (Minimum of 2 out of 6 clinical characteristics is required to make the diagnosis of moderate or severe Protein Calorie Malnutrition based on AND/ASPEN Guidelines):  1. Energy Intake-Greater than 75% of estimated energy requirement,      2. Weight Loss-No significant weight loss,    3. Fat Loss-No significant subcutaneous fat loss,    4. Muscle Loss-No significant muscle mass loss,    5. Fluid Accumulation-Unable to assess,    6.   Strength-Not measured    Nutrition Risk Level: Low, Moderate      Nutrition Diagnosis:   · Problem: Altered nutrition-related lab values  · Etiology: related to Cardiac dysfunction, Endocrine dysfunction     Signs and symptoms:  as evidenced by Lab values(cholesterol 259, , A1c 7.9)    Objective Information:  · Nutrition-Focused Physical Findings: No visual S/Sx of malnutrition  · Wound Type: None  · Current Nutrition Therapies:  · Oral Diet Orders: NPO   · Oral Diet intake: NPO  · Oral Nutrition Supplement (ONS) Orders: None  · Anthropometric Measures:  · Ht: 5' 3\" (160 cm)   · Current Body Wt: 146 lb 12.8 oz (66.6 kg)  · Admission Body Wt: 151 lb (68.5 kg)  · Usual Body Wt: 131 lb (59.4 kg)(4/22/19)  · % Weight Change:  ,  2.6% acute loss since admission(admission weight questioned)  · Ideal Body Wt: 115 lb (52.2 kg), % Ideal Body 128%  · BMI Classification: BMI 25.0 - 29.9 Overweight  Lab Results   Component Value Date    LABA1C 7.9 01/24/2020     Recent Labs     01/24/20  0729 01/24/20  0852   POCGLU 144* 199*     Lab Results   Component Value Date    TRIG 89 01/24/2020    HDL 89 01/24/2020     Recent Labs     01/23/20  1123      K 4.0      CO2 26   BUN 19   CREATININE 0.72   GLUCOSE 146*   ALT 9   ALKPHOS 87   GFR       NOT REPORTED      Lab Results   Component Value Date    LABALBU 4.9 01/23/2020      Nutrition Interventions:   Continue NPO  Continued Inpatient Monitoring, Coordination of Care, Education Completed    Nutrition Evaluation:   · Evaluation: Goals set   · Goals: PO > 75% of meals when diet progressed to CC 3 carbs, 2 gm sodium diet    · Monitoring: Meal Intake, I&O, Weight, Pertinent Labs, Patient/Family Education      Electronically signed by Solomon Herrera RD, FAIZA on 1/24/20 at 8:41 AM    Contact Number: [phone]

## 2020-01-24 NOTE — CONSULTS
[place] [address]                                  CONSULTATION    PATIENT NAME: Divya Wells                     :        1960  MED REC NO:   [id number]                              ROOM:       0920  ACCOUNT NO:   [de-identified]                           ADMIT DATE: 2020  PROVIDER:     Juan Vega    CONSULT DATE:  2020    REASON FOR CONSULT:  Chest pain and shortness of breath with exertion,  consistent with angina. HISTORY OF PRESENT ILLNESS:  The patient is a pleasant 74-year-old  female who has never had a cardiac problem in the past.  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 Approximately 3 weeks ago, she began noticing marked shortness of breath  with activity. She noticed when attempting to walking up steps that she  was developing marked shortness of breath. She also developed chest  discomfort, which is in a precordial region. It comes both with rest  and with activity but it seems more with activity such as walking up  steps. Her energy level has markedly decreased over the last 3 weeks. She has had no syncope or near syncope. Denies any PND or orthopnea. She has had no unusual pedal edema. Her shortness of breath with exertion has worsened over the past 3  weeks. She now can walk several steps before having to stop and catch  her breath. This is entirely new over the last 3 weeks. Yesterday, she was at work and developed pain on the left side and  became diaphoretic along with some nausea. This was the first time that  she had had the associated nausea and diaphoresis. This lasted  approximately 30 minutes and subsided. She did not seek medical  attention but saw Dr. Feliberto Quintanilla today.   Because her symptoms were  consistent with accelerating angina, she recommended hospitalization for  further workup. When I see her, she is resting comfortably. Her  is in the room. She denies any palpitations during her episodes of chest pain and  shortness of breath. She has never had any cardiac workup in the past and has never seen a  cardiologist.    CARDIAC RISK FACTORS:  Other Family Members:  Positive. Peripheral Vascular Disease:  Negative. Hypertension:  Positive. Diabetes:  Positive. Hyperlipidemia:  Positive. Smoking:  Negative. MEDICATIONS PRIOR TO ADMISSION:  She was on Glucophage 1000 mg b.i.d.,  Microzide 12.5 mg daily, Amaryl 2 mg daily, Cozaar 50 mg daily, Os-Deacon  500 mg daily, Zocor 40 mg daily. PAST MEDICAL HISTORY:  1. Has a long history of hypertension, hyperlipidemia, and  non-insulin-dependent diabetes. 2.  She has had a hysterectomy in 1986.  3.  Left breast biopsy many years ago. FAMILY HISTORY:  Her father had a myocardial infarction and also had  diabetes. SOCIAL HISTORY:  She is 64years old. . Does not smoke or drink  alcohol. She has worked at [employer] as a  for  approximately 7 years. A year and a half ago, she changed and has been  working at [employer] and also in maintenance detention work. She  states it is a very difficult and stressful job. She has 2 children,  both girls, ages 44 and 29. She is normally active at home but has done  very little activity in the last 3 weeks because of her marked fatigue,  shortness of breath with exertion, and chest pain. This had been noted  by her  also. She has never had any symptoms such as this  previously. REVIEW OF SYSTEMS:  Cardiac as above.   Other systems reviewed including  constitutional, eyes, ears, nose and throat, cardiovascular,  respiratory, GI, , musculoskeletal, integumentary, neurologic,  psychiatric, endocrine, hematologic and allergic/immunologic are  negative except for worsening, all consistent with new onset of angina. 2.  Non-insulin-dependent diabetes for many years. 3.  Hypertension. 4.  Hyperlipidemia. 5.  Family history of coronary artery disease. PLAN:  1. Lexiscan Cardiolite stress test.  2.  Echocardiogram.  3.  Would anticipate catheterization and the above tests will determine  whether it can be done as an outpatient or whether should be done as an  inpatient prior to discharge. DISCUSSION:  The patient has multiple risk factors for coronary artery  disease. She has developed increasing fatigue, loss of energy, and  chest pain as well as marked shortness of breath in the last 3 weeks. She noticed it most when attempting to walk up steps. She can walk up  several steps and has to stop to catch her breath before she walks  further up the steps. This is an entirely new symptom. Yesterday and  on the day before admission, she had a more prolonged 30-minute episode,  where she became diaphoretic and had some nausea. Her EKG shows no acute changes and her enzymes are negative. However,  her symptoms are very worrisome. We will do a Lexiscan Cardiolite  stress test and an echocardiogram.  We will place her on full medical  therapy. Obviously, if she would have any chest pain here, we would  transfer for early cardiac catheterization. Thank you very much for allowing me the privilege of seeing the patient. If you have any questions on my thoughts, please do not hesitate to  contact me.         Carly Montaño    D: 01/24/2020 4:14:32       T: 01/24/2020 6:02:26     [email or web address]  Job#: [id number]     Doc#: [id number]    CC:  Fabiana Stevenson

## 2020-01-25 LAB
ALBUMIN SERPL-MCNC: 4 G/DL (ref 3.5–5.2)
ALBUMIN/GLOBULIN RATIO: 1.4 (ref 1–2.5)
ALP BLD-CCNC: 71 U/L (ref 35–104)
ALT SERPL-CCNC: 18 U/L (ref 5–33)
ANION GAP SERPL CALCULATED.3IONS-SCNC: 14 MMOL/L (ref 9–17)
AST SERPL-CCNC: 16 U/L
BILIRUB SERPL-MCNC: 0.26 MG/DL (ref 0.3–1.2)
BNP INTERPRETATION: NORMAL
BUN BLDV-MCNC: 20 MG/DL (ref 6–20)
BUN/CREAT BLD: ABNORMAL (ref 9–20)
CALCIUM SERPL-MCNC: 9.8 MG/DL (ref 8.6–10.4)
CHLORIDE BLD-SCNC: 101 MMOL/L (ref 98–107)
CO2: 23 MMOL/L (ref 20–31)
CREAT SERPL-MCNC: 0.44 MG/DL (ref 0.5–0.9)
EKG ATRIAL RATE: 69 BPM
EKG P AXIS: 69 DEGREES
EKG P-R INTERVAL: 146 MS
EKG Q-T INTERVAL: 396 MS
EKG QRS DURATION: 80 MS
EKG QTC CALCULATION (BAZETT): 424 MS
EKG R AXIS: 46 DEGREES
EKG T AXIS: 16 DEGREES
EKG VENTRICULAR RATE: 69 BPM
GFR AFRICAN AMERICAN: >60 ML/MIN
GFR NON-AFRICAN AMERICAN: >60 ML/MIN
GFR SERPL CREATININE-BSD FRML MDRD: ABNORMAL ML/MIN/{1.73_M2}
GFR SERPL CREATININE-BSD FRML MDRD: ABNORMAL ML/MIN/{1.73_M2}
GLUCOSE BLD-MCNC: 139 MG/DL (ref 65–105)
GLUCOSE BLD-MCNC: 163 MG/DL (ref 70–99)
GLUCOSE BLD-MCNC: 221 MG/DL (ref 65–105)
GLUCOSE BLD-MCNC: 225 MG/DL (ref 65–105)
HCT VFR BLD CALC: 43.9 % (ref 36.3–47.1)
HEMOGLOBIN: 13.8 G/DL (ref 11.9–15.1)
MAGNESIUM: 2.1 MG/DL (ref 1.6–2.6)
MCH RBC QN AUTO: 30.3 PG (ref 25.2–33.5)
MCHC RBC AUTO-ENTMCNC: 31.4 G/DL (ref 28.4–34.8)
MCV RBC AUTO: 96.3 FL (ref 82.6–102.9)
NRBC AUTOMATED: 0 PER 100 WBC
PDW BLD-RTO: 13 % (ref 11.8–14.4)
PLATELET # BLD: 321 K/UL (ref 138–453)
PMV BLD AUTO: 11.5 FL (ref 8.1–13.5)
POTASSIUM SERPL-SCNC: 4 MMOL/L (ref 3.7–5.3)
PRO-BNP: <20 PG/ML
RBC # BLD: 4.56 M/UL (ref 3.95–5.11)
SODIUM BLD-SCNC: 138 MMOL/L (ref 135–144)
TOTAL PROTEIN: 6.8 G/DL (ref 6.4–8.3)
WBC # BLD: 7.7 K/UL (ref 3.5–11.3)

## 2020-01-25 PROCEDURE — 93010 ELECTROCARDIOGRAM REPORT: CPT | Performed by: INTERNAL MEDICINE

## 2020-01-25 PROCEDURE — 99232 SBSQ HOSP IP/OBS MODERATE 35: CPT | Performed by: INTERNAL MEDICINE

## 2020-01-25 PROCEDURE — 2580000003 HC RX 258: Performed by: NURSE PRACTITIONER

## 2020-01-25 PROCEDURE — 6370000000 HC RX 637 (ALT 250 FOR IP): Performed by: NURSE PRACTITIONER

## 2020-01-25 PROCEDURE — 2060000000 HC ICU INTERMEDIATE R&B

## 2020-01-25 PROCEDURE — 36415 COLL VENOUS BLD VENIPUNCTURE: CPT

## 2020-01-25 PROCEDURE — 83735 ASSAY OF MAGNESIUM: CPT

## 2020-01-25 PROCEDURE — 82947 ASSAY GLUCOSE BLOOD QUANT: CPT

## 2020-01-25 PROCEDURE — 85027 COMPLETE CBC AUTOMATED: CPT

## 2020-01-25 PROCEDURE — 80053 COMPREHEN METABOLIC PANEL: CPT

## 2020-01-25 PROCEDURE — 6370000000 HC RX 637 (ALT 250 FOR IP): Performed by: INTERNAL MEDICINE

## 2020-01-25 PROCEDURE — 83880 ASSAY OF NATRIURETIC PEPTIDE: CPT

## 2020-01-25 PROCEDURE — 6360000002 HC RX W HCPCS: Performed by: INTERNAL MEDICINE

## 2020-01-25 RX ORDER — DEXTROSE MONOHYDRATE 50 MG/ML
100 INJECTION, SOLUTION INTRAVENOUS PRN
Status: DISCONTINUED | OUTPATIENT
Start: 2020-01-25 | End: 2020-01-28 | Stop reason: HOSPADM

## 2020-01-25 RX ORDER — NICOTINE POLACRILEX 4 MG
15 LOZENGE BUCCAL PRN
Status: DISCONTINUED | OUTPATIENT
Start: 2020-01-25 | End: 2020-01-28 | Stop reason: HOSPADM

## 2020-01-25 RX ORDER — DEXTROSE MONOHYDRATE 25 G/50ML
12.5 INJECTION, SOLUTION INTRAVENOUS PRN
Status: DISCONTINUED | OUTPATIENT
Start: 2020-01-25 | End: 2020-01-28 | Stop reason: HOSPADM

## 2020-01-25 RX ADMIN — CALCIUM CARBONATE (ANTACID) CHEW TAB 500 MG 500 MG: 500 CHEW TAB at 09:36

## 2020-01-25 RX ADMIN — ENOXAPARIN SODIUM 60 MG: 60 INJECTION SUBCUTANEOUS at 21:46

## 2020-01-25 RX ADMIN — SODIUM CHLORIDE, PRESERVATIVE FREE 10 ML: 5 INJECTION INTRAVENOUS at 22:41

## 2020-01-25 RX ADMIN — ASPIRIN 81 MG: 81 TABLET, COATED ORAL at 09:36

## 2020-01-25 RX ADMIN — ACETAMINOPHEN 650 MG: 325 TABLET ORAL at 03:17

## 2020-01-25 RX ADMIN — GLIMEPIRIDE 2 MG: 2 TABLET ORAL at 09:37

## 2020-01-25 RX ADMIN — FAMOTIDINE 20 MG: 20 TABLET, FILM COATED ORAL at 21:46

## 2020-01-25 RX ADMIN — LOSARTAN POTASSIUM 50 MG: 50 TABLET, FILM COATED ORAL at 09:37

## 2020-01-25 RX ADMIN — METOPROLOL TARTRATE 25 MG: 25 TABLET ORAL at 09:37

## 2020-01-25 RX ADMIN — MULTIPLE VITAMINS W/ MINERALS TAB 1 TABLET: TAB at 09:37

## 2020-01-25 RX ADMIN — FAMOTIDINE 20 MG: 20 TABLET, FILM COATED ORAL at 09:37

## 2020-01-25 RX ADMIN — ENOXAPARIN SODIUM 60 MG: 60 INJECTION SUBCUTANEOUS at 09:36

## 2020-01-25 RX ADMIN — SODIUM CHLORIDE, PRESERVATIVE FREE 10 ML: 5 INJECTION INTRAVENOUS at 09:35

## 2020-01-25 RX ADMIN — HYDROCHLOROTHIAZIDE 12.5 MG: 12.5 CAPSULE ORAL at 09:37

## 2020-01-25 RX ADMIN — DESMOPRESSIN ACETATE 40 MG: 0.2 TABLET ORAL at 21:46

## 2020-01-25 RX ADMIN — INSULIN LISPRO 1 UNITS: 100 INJECTION, SOLUTION INTRAVENOUS; SUBCUTANEOUS at 21:46

## 2020-01-25 ASSESSMENT — PAIN SCALES - GENERAL: PAINLEVEL_OUTOF10: 7

## 2020-01-25 NOTE — PROGRESS NOTES
Pt admitted into room, alert/oriented, call light within reach, side rails up updated on plan of care will continue to monitor for any chnages

## 2020-01-25 NOTE — DISCHARGE SUMMARY
Hospitalist Discharge Summary    Patient:  Niesha Del Rio  YOB: 1960    MRN: [id number]   Acct: [de-identified]    Primary Care Physician: Naga Maloney MD    Admit date:  1/23/2020    Discharge date:  1/24/2020 Transfer to [facility]      Discharge Diagnoses:   Other chest pain   Unstable angina  DM type 2 non-insulin dependent  HTN  Hyperlipidemia  Noncompliance      Discharge Medications:       Meccajorge alberto Liu   Home Medication Instructions [id number]    Printed on:01/24/20 2113   Medication Information                      Blood Glucose Monitoring Suppl MILANA  1 Units by Does not apply route once for 1 dose DX-E11.9             calcium carbonate (OSCAL) 500 MG TABS tablet  Take 500 mg by mouth daily             clobetasol prop emollient base (CLOBETASOL PROPIONATE E) 0.05 % CREA  Apply topically daily             glimepiride (AMARYL) 2 MG tablet  Take 1 tablet by mouth every morning (before breakfast)             Glucose Blood (BLOOD GLUCOSE TEST STRIPS) STRP  Please dispense strips  That are covered by her insurance and her meter  DX-E11.9  Pt tests 4 x day             hydrochlorothiazide (MICROZIDE) 12.5 MG capsule  Take 1 capsule by mouth daily             hydrocortisone 2.5 % cream  Apply topically 2 times daily.              Insulin Pen Needle (MEIJER PEN NEEDLES) 31G X 6 MM MISC  1 each by Does not apply route daily             Lancets MISC  Please dispense lancets to go with meter and test strips  DX E11.9 pt tests QID             losartan (COZAAR) 50 MG tablet  Take 1 tablet by mouth daily             metFORMIN (GLUCOPHAGE) 1000 MG tablet  TAKE 1 TABLET BY MOUTH TWICE DAILY WITH MEALS             Multiple Vitamins-Minerals (THERAPEUTIC MULTIVITAMIN-MINERALS) tablet  Take 1 tablet by mouth daily             simvastatin (ZOCOR) 40 MG tablet  Take 1 tablet by mouth every evening                   Physical Exam:    Vitals:  Patient Vitals for the past 24 hrs:   BP Temp Temp src Pulse Resp SpO2   01/24/20 1745 138/68 97.9 °F (36.6 °C) Temporal 60 16 97 %   01/24/20 1400 -- -- -- -- -- 98 %   01/24/20 1130 (!) 143/73 97 °F (36.1 °C) Temporal 62 16 97 %   01/24/20 0843 (!) 169/87 -- -- 72 -- --   01/24/20 0842 (!) 164/85 -- -- 65 -- --   01/24/20 0841 (!) 157/86 -- -- 66 -- --   01/24/20 0840 (!) 167/92 -- -- 65 -- --   01/24/20 0839 (!) 171/98 -- -- 73 -- --   01/24/20 0838 (!) 184/102 -- -- 74 -- --   01/24/20 0837 (!) 183/102 -- -- 75 -- --   01/24/20 0836 (!) 184/102 -- -- 78 -- --   01/24/20 0835 (!) 189/103 -- -- 86 -- --   01/24/20 0831 (!) 108/102 -- -- 89 -- --   01/24/20 0830 (!) 182/95 -- -- 94 -- --   01/24/20 0829 (!) 178/94 -- -- 94 -- --   01/24/20 0828 (!) 196/104 -- -- 103 -- --   01/24/20 0826 (!) 200/106 -- -- 105 -- --   01/24/20 0825 (!) 196/107 -- -- 114 -- --   01/24/20 0824 (!) 147/96 -- -- 114 -- --   01/24/20 0823 (!) 174/109 -- -- 121 -- --   01/24/20 0822 (!) 167/93 -- -- 83 -- --   01/24/20 0813 (!) 169/89 -- -- 69 -- --   01/24/20 0730 139/76 98.1 °F (36.7 °C) Oral 60 16 96 %   01/24/20 0544 -- -- -- -- -- 97 %   01/24/20 0413 124/67 97.7 °F (36.5 °C) Oral 55 16 97 %   01/23/20 2349 123/64 98.1 °F (36.7 °C) Oral 60 16 96 %   01/23/20 2241 -- -- -- -- 16 95 %     Weight: Weight: 146 lb 12.8 oz (66.6 kg)   General Appearance: alert and oriented to person, place and time, in no acute distress  Cardiovascular: normal rate, regular rhythm, normal S1 and S2, no murmurs, rubs, clicks, or gallops, distal pulses intact  Pulmonary/Chest: clear to auscultation bilaterally- no wheezes, rales or rhonchi, normal air movement, no respiratory distress  Abdomen: soft, non-tender, non-distended, normal bowel sounds, no masses   Extremities: no cyanosis, clubbing or edema, pedal pulses 2+ bilaterally  Skin: warm and dry, no rash or erythema  Head: normocephalic and atraumatic  Eyes: pupils equal, round, and reactive to light  Neck: supple and non-tender without mass, no thyromegaly [facility] [place]. I discussed her case with cardiologist Dr. Alber Sage and hospsitalist . The patient was accepted in transfer .        Disposition: Transfer to [facility] for cardiac cath    Condition: Stable    Time Spent: 45 minutes      Electronically signed by Jenelle Smith MD on 1/24/2020 at 9:13 PM  Discharging Hospitalist

## 2020-01-25 NOTE — CONSULTS
showed inferior apical ischemia. EKG did not show any acute ischemic change. Tropes were negative at [facility] and at a place also 6-6. Past Medical History:   has a past medical history of Diabetes mellitus ([address] Hyperlipidemia, and Hypertension. Past Surgical History:   has a past surgical history that includes Hysterectomy (1986) and Breast biopsy (Left). Home Medications:    Prior to Admission medications    Medication Sig Start Date End Date Taking? Authorizing Provider   metFORMIN (GLUCOPHAGE) 1000 MG tablet TAKE 1 TABLET BY MOUTH TWICE DAILY WITH MEALS 9/17/19   Amira Do MD   hydrochlorothiazide (MICROZIDE) 12.5 MG capsule Take 1 capsule by mouth daily  Patient taking differently: Take 12.5 mg by mouth daily Does not take every day 6/24/19   Amira Do MD   glimepiride (AMARYL) 2 MG tablet Take 1 tablet by mouth every morning (before breakfast) 6/24/19   Amira oD MD   clobetasol prop emollient base (CLOBETASOL PROPIONATE E) 0.05 % CREA Apply topically daily 4/22/19   Amira Do MD   losartan (COZAAR) 50 MG tablet Take 1 tablet by mouth daily  Patient taking differently: Take 50 mg by mouth daily Does not take every day 2/2/19   Amira Do MD   simvastatin (ZOCOR) 40 MG tablet Take 1 tablet by mouth every evening  Patient taking differently: Take 40 mg by mouth every evening Does not take everyday 1/21/19   Amira Do MD   hydrocortisone 2.5 % cream Apply topically 2 times daily.  4/5/18   Amira Do MD   Blood Glucose Monitoring Suppl MILANA 1 Units by Does not apply route once for 1 dose DX-E11.9 10/26/17 10/26/17  Brit Teague MD   Glucose Blood (BLOOD GLUCOSE TEST STRIPS) STRP Please dispense strips  That are covered by her insurance and her meter  DX-E11.9  Pt tests 4 x day 10/26/17   Brit Teague MD   Lancets MISC Please dispense lancets to go with meter and test strips  DX E11.9 pt tests QID 10/26/17   Brit Teague MD   calcium carbonate (OSCAL) 500 MG TABS tablet Take 500 mg by mouth daily    Historical Provider, MD   Multiple Vitamins-Minerals (THERAPEUTIC MULTIVITAMIN-MINERALS) tablet Take 1 tablet by mouth daily    Historical Provider, MD   Insulin Pen Needle (MEIJER PEN NEEDLES) 31G X 6 MM MISC 1 each by Does not apply route daily 8/14/17   Bob Corona MD        Current Facility-Administered Medications: calcium carbonate (TUMS) chewable tablet 500 mg, 500 mg, Oral, Daily  glimepiride (AMARYL) tablet 2 mg, 2 mg, Oral, QAM AC  losartan (COZAAR) tablet 50 mg, 50 mg, Oral, Daily  therapeutic multivitamin-minerals 1 tablet, 1 tablet, Oral, Daily  hydrochlorothiazide (MICROZIDE) capsule 12.5 mg, 12.5 mg, Oral, Daily  sodium chloride flush 0.9 % injection 10 mL, 10 mL, Intravenous, 2 times per day  sodium chloride flush 0.9 % injection 10 mL, 10 mL, Intravenous, PRN  potassium chloride (KLOR-CON M) extended release tablet 40 mEq, 40 mEq, Oral, PRN **OR** potassium bicarb-citric acid (EFFER-K) effervescent tablet 40 mEq, 40 mEq, Oral, PRN **OR** potassium chloride 10 mEq/100 mL IVPB (Peripheral Line), 10 mEq, Intravenous, PRN  magnesium sulfate 1 g in dextrose 5% 100 mL IVPB, 1 g, Intravenous, PRN  magnesium hydroxide (MILK OF MAGNESIA) 400 MG/5ML suspension 30 mL, 30 mL, Oral, Daily PRN  ondansetron (ZOFRAN) injection 4 mg, 4 mg, Intravenous, Q6H PRN  famotidine (PEPCID) tablet 20 mg, 20 mg, Oral, BID  metoprolol tartrate (LOPRESSOR) tablet 25 mg, 25 mg, Oral, BID  nitroGLYCERIN (NITROSTAT) SL tablet 0.4 mg, 0.4 mg, Sublingual, Q5 Min PRN  acetaminophen (TYLENOL) tablet 650 mg, 650 mg, Oral, Q4H PRN  aspirin EC tablet 81 mg, 81 mg, Oral, Daily  enoxaparin (LOVENOX) injection 60 mg, 1 mg/kg, Subcutaneous, BID  atorvastatin (LIPITOR) tablet 40 mg, 40 mg, Oral, Nightly    Allergies:  Patient has no known allergies. Social History:   reports that she quit smoking about 21 years ago. She has a 10.00 pack-year smoking history.  She has never used smokeless tobacco. She reports that she does not drink alcohol or use drugs. Family History: family history includes Diabetes in her father; No Known Problems in her mother. No h/o sudden cardiac death. No for premature CAD    REVIEW OF SYSTEMS:    · Constitutional: there has been no unanticipated weight loss. There's been No change in energy level, No change in activity level. · Eyes: No visual changes or diplopia. No scleral icterus. · ENT: No Headaches  · Cardiovascular: Remaining as above  · Respiratory: No previous pulmonary problems, No cough  · Gastrointestinal: No abdominal pain. No change in bowel or bladder habits. · Genitourinary: No dysuria, trouble voiding, or hematuria. · Musculoskeletal:  No gait disturbance, No weakness or joint complaints. · Integumentary: No rash or pruritis. · Neurological: No headache, diplopia, change in muscle strength, numbness or tingling. No change in gait, balance, coordination, mood, affect, memory, mentation, behavior. · Psychiatric: No anxiety, or depression. · Endocrine: No temperature intolerance. No excessive thirst, fluid intake, or urination. No tremor. · Hematologic/Lymphatic: No abnormal bruising or bleeding, blood clots or swollen lymph nodes. · Allergic/Immunologic: No nasal congestion or hives. PHYSICAL EXAM:      /75   Pulse 61   Temp 96.8 °F (36 °C) (Oral)   Resp 17   Ht 5' 4\" (1.626 m)   Wt 142 lb 1.6 oz (64.5 kg)   SpO2 97%   BMI 24.39 kg/m²      Intake/Output Summary (Last 24 hours) at 1/25/2020 0631  Last data filed at 1/25/2020 0544  Gross per 24 hour   Intake 240 ml   Output 350 ml   Net -110 ml     Constitutional and General Appearance: alert, cooperative, no distress and appears stated age  HEENT: PERRL, no cervical lymphadenopathy. No masses palpable. Normal oral mucosa  Respiratory:  · Normal excursion and expansion without use of accessory muscles  · Resp Auscultation: Good respiratory effort.  No for increased work of breathing. On auscultation: clear to auscultation bilaterally  Cardiovascular:  · The apical impulse is not displaced  · Heart tones are crisp and normal. regular S1 and S2.  · Jugular venous pulsation Normal  · The carotid upstroke is normal in amplitude and contour without delay or bruit  · Peripheral pulses are symmetrical and full     Abdomen:   · No masses or tenderness  · Bowel sounds present  Extremities:  ·  No Cyanosis or Clubbing  ·  Lower extremity edema: No  ·  Skin: Warm and dry  Neurological:  · Alert and oriented. · Moves all extremities well  · No abnormalities of mood, affect, memory, mentation, or behavior are noted    DATA:    Diagnostics:    EKG:    EKG Interpretation        Clinical Impression: Normal sinus rhythm, nonspecific ST abnormal        ECHO: Fraction 60%, mild LVH, no valvular regurgitation or stenosis  Stress Test showed inferior apical ischemia  Labs:     CBC:   Recent Labs     01/23/20  1123 01/24/20  0535   WBC 5.6 4.1   HGB 14.4 14.2   HCT 43.4 42.8    294     BMP:   Recent Labs     01/23/20  1123      K 4.0   CO2 26   BUN 19   CREATININE 0.72   LABGLOM >60   GLUCOSE 146*     BNP: No results for input(s): BNP in the last 72 hours. PT/INR: No results for input(s): PROTIME, INR in the last 72 hours. APTT:No results for input(s): APTT in the last 72 hours. CARDIAC ENZYMES:No results for input(s): CKTOTAL, CKMB, CKMBINDEX, TROPONINI in the last 72 hours.     Invalid input(s):  TROPONINT  FASTING LIPID PANEL:  Lab Results   Component Value Date    HDL 89 01/24/2020    TRIG 89 01/24/2020     LIVER PROFILE:  Recent Labs     01/23/20  1123   AST 21   ALT 9   LABALBU 4.9       Patient Active Problem List   Diagnosis    Type 2 diabetes mellitus without complication, without long-term current use of insulin (HCC)    Mixed hyperlipidemia    Essential hypertension    Anxiety    Noncompliance with medications    Other chest pain    Chest pain   

## 2020-01-25 NOTE — PROGRESS NOTES
Pt. To room via EMS. Pt. Ambulated to bed, hooked up to monitor NSR, VS as charted. Pt. Oriented to room, call light within reach, bed locked in low position, new gown applied. Pt.  at bedside. Will monitor.

## 2020-01-25 NOTE — H&P
[address]     HISTORY AND PHYSICAL EXAMINATION            Date:   1/25/2020  Patient name:  Freddy Torres  Date of admission:  1/24/2020  7:23 PM  MRN:   [id number]  Account:  [de-identified]  YOB: 1960  PCP:    Casie Uriostegui MD  Room:   3017/3017-01  Code Status:    Full Code    Chief Complaint:     Chest pain    History Obtained From:     patient    History of Present Illness:     Freddy Torres is a 64 y.o.  female who was transferred from [facility] where she presented with intermittent mid-sternal and left upper chest pain and shortness of breath over the past 3-4 weeks and is admitted to the hospital for the management of Unstable angina ([facility] [address] She describes the chest pain as a moderate ache and rates it as a 5 out of 10. She states that it radiates to her neck and left shoulder at times and has also had intermittent associated episodes of diuresis, nausea and dizziness. Cardiac work-up at [facility] positive Lexiscan stress test consistent with inferior apical ischemia. Cardiac enzymes and chest x-ray were unremarkable. Cardiac echo showed normal LV function with an EF 55%. She was transferred here for cardiac cath. During my assessment she is currently free of chest pain and just complains of fatigue. She denies any other symptoms. Past Medical History:     Past Medical History:   Diagnosis Date    Diabetes mellitus ([facility] [address]     Hyperlipidemia     Hypertension         Past Surgical History:     Past Surgical History:   Procedure Laterality Date    BREAST BIOPSY Left     HYSTERECTOMY  1986    uterine cancer        Medications Prior to Admission:     Prior to Admission medications    Medication Sig Start Date End Date Taking?  Authorizing Provider   metFORMIN (GLUCOPHAGE) 1000 MG tablet TAKE 1 TABLET BY MOUTH TWICE DAILY WITH MEALS 9/17/19   Casie Uriostegui MD   hydrochlorothiazide (MICROZIDE) 12.5 MG use.    Family History:     Family History   Problem Relation Age of Onset    No Known Problems Mother     Diabetes Father        Review of Systems:     Positive and Negative as described in HPI. CONSTITUTIONAL:  negative for fevers, chills, sweats, weight loss.   Positive for fatigue  HEENT:  negative for vision, hearing changes, runny nose, throat pain  RESPIRATORY: Positive for intermittent shortness of breath, negative for cough, congestion, wheezing  CARDIOVASCULAR: Positive for intermittent chest pain, negative for palpitations  GASTROINTESTINAL:  negative for nausea, vomiting, diarrhea, constipation, change in bowel habits, abdominal pain   GENITOURINARY:  negative for difficulty of urination, burning with urination, frequency   INTEGUMENT:  negative for rash, skin lesions, easy bruising   HEMATOLOGIC/LYMPHATIC:  negative for swelling/edema   ALLERGIC/IMMUNOLOGIC:  negative for urticaria , itching  ENDOCRINE:  negative increase in drinking, increase in urination, hot or cold intolerance  MUSCULOSKELETAL:  negative joint pains, muscle aches, swelling of joints  NEUROLOGICAL:  negative for headaches, dizziness, lightheadedness, numbness, pain, tingling extremities  BEHAVIOR/PSYCH:  negative for depression, positive for anxiety    Physical Exam:   /75   Pulse 61   Temp 96.8 °F (36 °C) (Oral)   Resp 17   Ht 5' 4\" (1.626 m)   Wt 142 lb 1.6 oz (64.5 kg)   SpO2 97%   BMI 24.39 kg/m²   Temp (24hrs), Av.5 °F (36.4 °C), Min:96.8 °F (36 °C), Max:98.1 °F (36.7 °C)    Recent Labs     20  0729 20  0852 20  1141   POCGLU 144* 199* 156*       Intake/Output Summary (Last 24 hours) at 2020 0653  Last data filed at 2020 0544  Gross per 24 hour   Intake 240 ml   Output 350 ml   Net -110 ml       General Appearance: alert, well appearing, and in no acute distress  Mental status: oriented to person, place, and time  Head: normocephalic, atraumatic  Eye: no icterus, redness, pupils

## 2020-01-25 NOTE — PROGRESS NOTES
Patient seen and evaluated    Transferred from [facility] where she presented episodes of chest pain with exertion for last couple of weeks. She underwent stress test which showed inferior apical ischemia. EKG without acute ischemic changes, trop negative. She is currently chest pain free. Due to progressive symptoms of angina for last 2 weeks, will treat as unstable angina with aspirin, statin and enoxaparin for anticoagulation. Possible cath on Monday after evaluation by Attending.     Karen Potts MD  Fellow, [facility]

## 2020-01-25 NOTE — CARE COORDINATION
Case Management Initial Discharge Plan  Linda Morin,             Met with:patient to discuss discharge plans. Information verified: address, contacts, phone number, , insurance Yes  PCP: Yusra Pabon MD  Date of last visit:  2020    Insurance Provider: Ozzy Ruffin 150    Discharge Planning    Living Arrangements:  Spouse/Significant Other   Support Systems:  Spouse/Significant Other    Home has 1 stories  3 stairs to climb to get into front door    Patient able to perform ADL's:Independent    Current Services (outpatient & in home) none  DME equipment: glucometer  DME provider:       Potential Assistance Needed:  N/A    Patient agreeable to home care: No  [place] of choice provided:  n/a    Prior SNF/Rehab Placement and Facility: none  Agreeable to SNF/Rehab: No  [place] of choice provided: n/a   Evaluation: n/a    Expected Discharge date:  20  Patient expects to be discharged to:  home  Follow Up Appointment: Best Day/ Time: Monday PM    Transportation provider:   Transportation arrangements needed for discharge: No    Readmission Risk              Risk of Unplanned Readmission:        11             Does patient have a readmission risk score greater than 14?: No  If yes, follow-up appointment must be made within 7 days of discharge.      Goals of Care: free of chest pain      Discharge Plan: home independent with           Electronically signed by Felecia Clayton RN on 20 at 1:08 PM

## 2020-01-25 NOTE — PROGRESS NOTES
cough, dyspnea on exertion, hemoptysis, wheezing  Cardiovascular: Reports improvement of her chest pain. Negative for lower extremity edema, palpitations  Gastrointestinal:  negative for abdominal pain, constipation, diarrhea, nausea, vomiting  Neurological:  negative for dizziness, headache    Medications: Allergies:  No Known Allergies    Current Meds:   Scheduled Meds:    insulin lispro  0-6 Units Subcutaneous TID WC    insulin lispro  0-3 Units Subcutaneous Nightly    calcium carbonate  500 mg Oral Daily    losartan  50 mg Oral Daily    therapeutic multivitamin-minerals  1 tablet Oral Daily    hydrochlorothiazide  12.5 mg Oral Daily    sodium chloride flush  10 mL Intravenous 2 times per day    famotidine  20 mg Oral BID    metoprolol tartrate  25 mg Oral BID    aspirin  81 mg Oral Daily    enoxaparin  1 mg/kg Subcutaneous BID    atorvastatin  40 mg Oral Nightly     Continuous Infusions:    dextrose       PRN Meds: glucose, dextrose, glucagon (rDNA), dextrose, sodium chloride flush, potassium chloride **OR** potassium alternative oral replacement **OR** potassium chloride, magnesium sulfate, magnesium hydroxide, ondansetron, nitroGLYCERIN, acetaminophen    Data:     Past Medical History:   has a past medical history of Diabetes mellitus ([address] Hyperlipidemia, and Hypertension. Social History:   reports that she quit smoking about 21 years ago. She has a 10.00 pack-year smoking history. She has never used smokeless tobacco. She reports that she does not drink alcohol or use drugs.      Family History:   Family History   Problem Relation Age of Onset    No Known Problems Mother     Diabetes Father        Vitals:  /74   Pulse 60   Temp 98 °F (36.7 °C) (Oral)   Resp 16   Ht 5' 4\" (1.626 m)   Wt 142 lb 1.6 oz (64.5 kg)   SpO2 96%   BMI 24.39 kg/m²   Temp (24hrs), Av.7 °F (36.5 °C), Min:96.8 °F (36 °C), Max:98 °F (36.7 °C)    Recent Labs     20  0729 20  0098 01/24/20  1141   POCGLU 144* 199* 156*       I/O (24Hr): Intake/Output Summary (Last 24 hours) at 1/25/2020 1225  Last data filed at 1/25/2020 1030  Gross per 24 hour   Intake 760 ml   Output 350 ml   Net 410 ml       Labs:  Hematology:  Recent Labs     01/23/20  1123 01/24/20  0535 01/25/20  0722   WBC 5.6 4.1 7.7   RBC 4.72 4.63 4.56   HGB 14.4 14.2 13.8   HCT 43.4 42.8 43.9   MCV 92.0 92.3 96.3   MCH 30.5 30.7 30.3   MCHC 33.2 33.2 31.4   RDW 13.7 13.7 13.0    294 321   MPV NOT REPORTED NOT REPORTED 11.5     Chemistry:  Recent Labs     01/23/20  1123  01/23/20  1745 01/24/20 2010 01/24/20 2308 01/25/20  0722     --   --   --   --  138   K 4.0  --   --   --   --  4.0     --   --   --   --  101   CO2 26  --   --   --   --  23   GLUCOSE 146*  --   --   --   --  163*   BUN 19  --   --   --   --  20   CREATININE 0.72  --   --   --   --  0.44*   MG 2.2  --   --   --   --  2.1   ANIONGAP 12  --   --   --   --  14   LABGLOM >60  --   --   --   --  >60   GFRAA >60  --   --   --   --  >60   CALCIUM 10.3  --   --   --   --  9.8   PROBNP 21  --   --   --   --  <20   TROPHS NOT REPORTED   < > NOT REPORTED <6 <6  --     < > = values in this interval not displayed.      Recent Labs     01/23/20  1123 01/24/20  0535 01/24/20  0729 01/24/20  0852 01/24/20  1141 01/25/20  0722   PROT 8.0  --   --   --   --  6.8   LABALBU 4.9  --   --   --   --  4.0   LABA1C  --  7.9*  --   --   --   --    TSH 2.22  --   --   --   --   --    AST 21  --   --   --   --  16   ALT 9  --   --   --   --  18   ALKPHOS 87  --   --   --   --  71   BILITOT 0.19*  --   --   --   --  0.26*   CHOL  --  259*  --   --   --   --    HDL  --  89  --   --   --   --    LDLCHOLESTEROL  --  152*  --   --   --   --    CHOLHDLRATIO  --  2.9  --   --   --   --    TRIG  --  89  --   --   --   --    VLDL  --  NOT REPORTED  --   --   --   --    POCGLU  --   --  144* 199* 156*  --      ABG:No results found for: POCPH, PHART, PH, POCPCO2, VDO9UAR, PCO2, POCPO2, PO2ART, PO2, POCHCO3, QOU6HLV, HCO3, NBEA, PBEA, BEART, BE, THGBART, THB, ZZO6DIF, AXHO7GBG, B0DWOEDT, O2SAT, FIO2  Lab Results   Component Value Date/Time    SPECIAL NOT REPORTED 01/23/2020 11:45 AM     Lab Results   Component Value Date/Time    CULTURE NO SIGNIFICANT GROWTH 01/23/2020 11:45 AM       Radiology:  [facility] Chest Standard (2 Vw)    Result Date: 1/23/2020  Negative chest.       Physical Examination:        General appearance:  alert, cooperative and no distress,  female sitting up in bed  Mental Status:  oriented to person, place and time and normal affect  Lungs:  clear to auscultation bilaterally, normal effort  Heart:  regular rate and rhythm, no murmur  Abdomen:  soft, nontender, nondistended, normal bowel sounds, no masses, hepatomegaly, splenomegaly  Extremities:  no edema, redness, tenderness in the calves  Skin:  no gross lesions, rashes, induration    Assessment:        Hospital Problems           Last Modified POA    * (Principal) Unstable angina ([facility] [address] 1/25/2020 Yes    Type 2 diabetes mellitus without complication, without long-term current use of insulin ([facility] [address] 1/25/2020 Yes    Mixed hyperlipidemia 1/25/2020 Yes    Essential hypertension 1/25/2020 Yes    Anxiety 1/25/2020 Yes          Plan:        1. Appreciate cardiology recommendations  2. Plan for cath on Monday  3. Lovenox for ACS protocol  4. Start aspirin, lopressor 25 mg BID  5. Change Zocor to Lipitor 40  6. Continue ISS; hold amaryl and metformin while in house  7. Check labs Monday morning  8. Monitor telemetry  9.  Diabetic/cardiac diet    33 Aretha Ramires,   1/25/2020  12:25 PM

## 2020-01-26 LAB
GLUCOSE BLD-MCNC: 137 MG/DL (ref 65–105)
GLUCOSE BLD-MCNC: 149 MG/DL (ref 65–105)
GLUCOSE BLD-MCNC: 183 MG/DL (ref 65–105)
GLUCOSE BLD-MCNC: 200 MG/DL (ref 65–105)

## 2020-01-26 PROCEDURE — 82947 ASSAY GLUCOSE BLOOD QUANT: CPT

## 2020-01-26 PROCEDURE — 6370000000 HC RX 637 (ALT 250 FOR IP): Performed by: INTERNAL MEDICINE

## 2020-01-26 PROCEDURE — 2580000003 HC RX 258: Performed by: NURSE PRACTITIONER

## 2020-01-26 PROCEDURE — 99231 SBSQ HOSP IP/OBS SF/LOW 25: CPT | Performed by: INTERNAL MEDICINE

## 2020-01-26 PROCEDURE — 6370000000 HC RX 637 (ALT 250 FOR IP): Performed by: NURSE PRACTITIONER

## 2020-01-26 PROCEDURE — 6360000002 HC RX W HCPCS: Performed by: INTERNAL MEDICINE

## 2020-01-26 PROCEDURE — 2060000000 HC ICU INTERMEDIATE R&B

## 2020-01-26 RX ADMIN — INSULIN LISPRO 2 UNITS: 100 INJECTION, SOLUTION INTRAVENOUS; SUBCUTANEOUS at 17:22

## 2020-01-26 RX ADMIN — LOSARTAN POTASSIUM 50 MG: 50 TABLET, FILM COATED ORAL at 09:30

## 2020-01-26 RX ADMIN — INSULIN LISPRO 1 UNITS: 100 INJECTION, SOLUTION INTRAVENOUS; SUBCUTANEOUS at 11:51

## 2020-01-26 RX ADMIN — INSULIN LISPRO 1 UNITS: 100 INJECTION, SOLUTION INTRAVENOUS; SUBCUTANEOUS at 21:03

## 2020-01-26 RX ADMIN — ENOXAPARIN SODIUM 60 MG: 60 INJECTION SUBCUTANEOUS at 09:29

## 2020-01-26 RX ADMIN — CALCIUM CARBONATE (ANTACID) CHEW TAB 500 MG 500 MG: 500 CHEW TAB at 09:29

## 2020-01-26 RX ADMIN — FAMOTIDINE 20 MG: 20 TABLET, FILM COATED ORAL at 09:29

## 2020-01-26 RX ADMIN — ASPIRIN 81 MG: 81 TABLET, COATED ORAL at 09:29

## 2020-01-26 RX ADMIN — HYDROCHLOROTHIAZIDE 12.5 MG: 12.5 CAPSULE ORAL at 09:30

## 2020-01-26 RX ADMIN — MULTIPLE VITAMINS W/ MINERALS TAB 1 TABLET: TAB at 09:31

## 2020-01-26 RX ADMIN — FAMOTIDINE 20 MG: 20 TABLET, FILM COATED ORAL at 21:03

## 2020-01-26 RX ADMIN — SODIUM CHLORIDE, PRESERVATIVE FREE 10 ML: 5 INJECTION INTRAVENOUS at 21:03

## 2020-01-26 RX ADMIN — METOPROLOL TARTRATE 25 MG: 25 TABLET ORAL at 09:30

## 2020-01-26 RX ADMIN — ENOXAPARIN SODIUM 60 MG: 60 INJECTION SUBCUTANEOUS at 21:02

## 2020-01-26 RX ADMIN — DESMOPRESSIN ACETATE 40 MG: 0.2 TABLET ORAL at 21:02

## 2020-01-26 RX ADMIN — SODIUM CHLORIDE, PRESERVATIVE FREE 10 ML: 5 INJECTION INTRAVENOUS at 09:32

## 2020-01-26 ASSESSMENT — PAIN SCALES - GENERAL
PAINLEVEL_OUTOF10: 0

## 2020-01-26 ASSESSMENT — PAIN - FUNCTIONAL ASSESSMENT
PAIN_FUNCTIONAL_ASSESSMENT: ACTIVITIES ARE NOT PREVENTED
PAIN_FUNCTIONAL_ASSESSMENT: ACTIVITIES ARE NOT PREVENTED

## 2020-01-26 NOTE — PROGRESS NOTES
Lancets MISC Please dispense lancets to go with meter and test strips  DX E11.9 pt tests QID 10/26/17  Yes Amada Street MD   calcium carbonate (OSCAL) 500 MG TABS tablet Take 500 mg by mouth daily   Yes Historical Provider, MD   Multiple Vitamins-Minerals (THERAPEUTIC MULTIVITAMIN-MINERALS) tablet Take 1 tablet by mouth daily   Yes Historical Provider, MD   Insulin Pen Needle (MEIJER PEN NEEDLES) 31G X 6 MM MISC 1 each by Does not apply route daily 8/14/17  Yes Amada Street MD   Blood Glucose Monitoring Suppl MILANA 1 Units by Does not apply route once for 1 dose DX-E11.9 10/26/17 10/26/17  Amada Street MD        Current Facility-Administered Medications: insulin lispro (HUMALOG) injection vial 0-6 Units, 0-6 Units, Subcutaneous, TID WC  insulin lispro (HUMALOG) injection vial 0-3 Units, 0-3 Units, Subcutaneous, Nightly  glucose (GLUTOSE) 40 % oral gel 15 g, 15 g, Oral, PRN  dextrose 50 % IV solution, 12.5 g, Intravenous, PRN  glucagon (rDNA) injection 1 mg, 1 mg, Intramuscular, PRN  dextrose 5 % solution, 100 mL/hr, Intravenous, PRN  calcium carbonate (TUMS) chewable tablet 500 mg, 500 mg, Oral, Daily  losartan (COZAAR) tablet 50 mg, 50 mg, Oral, Daily  therapeutic multivitamin-minerals 1 tablet, 1 tablet, Oral, Daily  hydrochlorothiazide (MICROZIDE) capsule 12.5 mg, 12.5 mg, Oral, Daily  sodium chloride flush 0.9 % injection 10 mL, 10 mL, Intravenous, 2 times per day  sodium chloride flush 0.9 % injection 10 mL, 10 mL, Intravenous, PRN  potassium chloride (KLOR-CON M) extended release tablet 40 mEq, 40 mEq, Oral, PRN **OR** potassium bicarb-citric acid (EFFER-K) effervescent tablet 40 mEq, 40 mEq, Oral, PRN **OR** potassium chloride 10 mEq/100 mL IVPB (Peripheral Line), 10 mEq, Intravenous, PRN  magnesium sulfate 1 g in dextrose 5% 100 mL IVPB, 1 g, Intravenous, PRN  magnesium hydroxide (MILK OF MAGNESIA) 400 MG/5ML suspension 30 mL, 30 mL, Oral, Daily PRN  ondansetron (ZOFRAN) injection 4 mg, 4 mg, Intravenous, Q6H PRN  famotidine (PEPCID) tablet 20 mg, 20 mg, Oral, BID  metoprolol tartrate (LOPRESSOR) tablet 25 mg, 25 mg, Oral, BID  nitroGLYCERIN (NITROSTAT) SL tablet 0.4 mg, 0.4 mg, Sublingual, Q5 Min PRN  acetaminophen (TYLENOL) tablet 650 mg, 650 mg, Oral, Q4H PRN  aspirin EC tablet 81 mg, 81 mg, Oral, Daily  enoxaparin (LOVENOX) injection 60 mg, 1 mg/kg, Subcutaneous, BID  atorvastatin (LIPITOR) tablet 40 mg, 40 mg, Oral, Nightly    Allergies:  Patient has no known allergies. Social History:   reports that she quit smoking about 21 years ago. She has a 10.00 pack-year smoking history. She has never used smokeless tobacco. She reports that she does not drink alcohol or use drugs. Family History: family history includes Diabetes in her father; No Known Problems in her mother. No h/o sudden cardiac death. No for premature CAD    REVIEW OF SYSTEMS:    · Constitutional: there has been no unanticipated weight loss. There's been No change in energy level, No change in activity level. · Eyes: No visual changes or diplopia. No scleral icterus. · ENT: No Headaches  · Cardiovascular: Remaining as above  · Respiratory: No previous pulmonary problems, No cough  · Gastrointestinal: No abdominal pain. No change in bowel or bladder habits. · Genitourinary: No dysuria, trouble voiding, or hematuria. · Musculoskeletal:  No gait disturbance, No weakness or joint complaints. · Integumentary: No rash or pruritis. · Neurological: No headache, diplopia, change in muscle strength, numbness or tingling. No change in gait, balance, coordination, mood, affect, memory, mentation, behavior. · Psychiatric: No anxiety, or depression. · Endocrine: No temperature intolerance. No excessive thirst, fluid intake, or urination. No tremor. · Hematologic/Lymphatic: No abnormal bruising or bleeding, blood clots or swollen lymph nodes. · Allergic/Immunologic: No nasal congestion or hives.       PHYSICAL EXAM:      /63 Pulse 60   Temp 98.3 °F (36.8 °C) (Oral)   Resp 16   Ht 5' 4\" (1.626 m)   Wt 142 lb 1.6 oz (64.5 kg)   SpO2 97%   BMI 24.39 kg/m²      Intake/Output Summary (Last 24 hours) at 1/26/2020 5504  Last data filed at 1/25/2020 1030  Gross per 24 hour   Intake 520 ml   Output --   Net 520 ml     Constitutional and General Appearance: alert, cooperative, no distress and appears stated age. Resting comfortably  HEENT: PERRL, no cervical lymphadenopathy. No masses palpable. Normal oral mucosa  Respiratory:  · Normal excursion and expansion without use of accessory muscles  · Resp Auscultation: Good respiratory effort. No for increased work of breathing. On auscultation: CTAB  Cardiovascular:  · The apical impulse is not displaced  · Heart tones are crisp and normal. regular S1 and S2.  · Jugular venous pulsation Normal  · The carotid upstroke is normal in amplitude and contour without delay or bruit  · Peripheral pulses are symmetrical and full     Abdomen:   · No masses or tenderness  · Bowel sounds present  Extremities:  ·  No Cyanosis or Clubbing  ·  Lower extremity edema: No  ·  Skin: Warm and dry  Neurological:  · Alert and oriented. · Moves all extremities well  · No abnormalities of mood, affect, memory, mentation, or behavior are noted    DATA:      Diagnostics:    EKG: NSR    ECHO: Fraction 60%, mild LVH, no valvular regurgitation or stenosis    Stress Test showed inferior apical ischemia    Labs:     CBC:   Recent Labs     01/24/20  0535 01/25/20  0722   WBC 4.1 7.7   HGB 14.2 13.8   HCT 42.8 43.9    321     BMP:   Recent Labs     01/23/20  1123 01/25/20  0722    138   K 4.0 4.0   CO2 26 23   BUN 19 20   CREATININE 0.72 0.44*   LABGLOM >60 >60   GLUCOSE 146* 163*     BNP: No results for input(s): BNP in the last 72 hours. PT/INR: No results for input(s): PROTIME, INR in the last 72 hours. APTT:No results for input(s): APTT in the last 72 hours.   CARDIAC ENZYMES:No results for input(s): CKTOTAL, CKMB, CKMBINDEX, TROPONINI in the last 72 hours. Invalid input(s):  TROPONINT  FASTING LIPID PANEL:  Lab Results   Component Value Date    HDL 89 01/24/2020    TRIG 89 01/24/2020     LIVER PROFILE:  Recent Labs     01/23/20  1123 01/25/20  0722   AST 21 16   ALT 9 18   LABALBU 4.9 4.0       Patient Active Problem List   Diagnosis    Type 2 diabetes mellitus without complication, without long-term current use of insulin (HCC)    Mixed hyperlipidemia    Essential hypertension    Anxiety    Noncompliance with medications    Other chest pain    Chest pain    [facility] ([facility]    Diabetes mellitus ([facility] [address] [facility]    Hyperlipidemia    Hypertension         IMPRESSION:    1. Unstable angina  Stress test showed inferior apical ischemia  Continue aspirin, statin, Lovenox  Plan for cardiac cath tomorrow 1/27      2. Type 2 diabetes   on glipizide    3. Hx of Hypertension   on Cozaar along with hydrochlorothiazide and Lopressor      Discussed with patient. Estephania Lewis MD  Internal Medicine Resident, PGY-1  [facility]; [place], [place]  1/26/2020, 9:37 AM     Attending Physician Statement  I have discussed the care of the patient, including pertinent history and exam findings, with the resident. I have seen and examined the patient and the key elements of all parts of the encounter have been performed by me. I agree with the assessment, plan and orders as documented by the resident.       Cath tomorrow  NPO after midnight  Patient was given details of the procedure and is agreeable to proceed  Kelly Cantu MD

## 2020-01-26 NOTE — FLOWSHEET NOTE
[address] [facility] [address]  PROGRESS NOTE    Room # 8183/1769-48   Name: Angelica Sims            Moravian: Lutheran     Shift date: 1/26/20  Shift day: Sunday   Shift # 1    Reason for visit: Rounding    I met with the patient. Admit Date & Time: 1/24/2020  7:23 PM    Assessment:  Angelica Sims is a 64 y.o. female in the hospital because of an \"unstable angina. \" Upon entering the room, pt was sitting up in bed. Pt was passive in conversation and not talkative. Pt expressed that she was doing \"well\" this morning. Intervention:  I introduced myself and my title as . I offered space for pt to express feelings and concerns and provided a ministry presence. Outcome:  Pt declined services but was grateful for the visit. Plan:  Chaplains will remain available to offer spiritual and emotional support as needed. Electronically signed by Librado Salamanca, on 1/26/2020 at 10:28 AM.  [facility]  [phone]       01/26/20 1026   Encounter Summary   Services provided to: Patient   Referral/Consult From: [facility]   Support System Spouse; Children   Continue Visiting   (1/26/20)   Complexity of Encounter Low   Length of Encounter 15 minutes   Spiritual Assessment Completed Yes   Routine   Type Initial   Assessment Calm;Passive;Coping   Intervention Active listening;Explored feelings, thoughts, concerns;Nurtured hope;Sustaining presence/ Ministry of presence   Outcome Expressed gratitude;Coping;Refused/declined

## 2020-01-26 NOTE — PROGRESS NOTES
[address]    Progress Note    1/26/2020    3:47 PM    Name:   Cristina Ryan  MRN:     [id number]     Acct:      [de-identified]   Room:   [facility] Day:  2  Admit Date:  1/24/2020  7:23 PM    PCP:   Mecca Stack MD  Code Status:  Full Code    Subjective:     C/C: Worsening shortness of breath / chest discomfort    Interval History Status: unchanged    Patient was seen and examined this afternoon. The patient and her  are seen in the vending machine area. No other patients around. She reports no shortness of breath, nausea, vomiting. No recurrent chest pain. No dyspnea with exertion. Vitals stable. Brief History:     Per my associate overnight:    Cristina Ryan is a 64 y.o.  female who was transferred from [facility] where she presented with intermittent mid-sternal and left upper chest pain and shortness of breath over the past 3-4 weeks and is admitted to the hospital for the management of Unstable angina ([address] She describes the chest pain as a moderate ache and rates it as a 5 out of 10. She states that it radiates to her neck and left shoulder at times and has also had intermittent associated episodes of diuresis, nausea and dizziness. [facility] consistent with inferior apical ischemia. Cardiac enzymes and chest x-ray were unremarkable. Cardiac echo showed normal LV function with an EF 55%. She was transferred here for cardiac cath. During my assessment she is currently free of chest pain and just complains of fatigue. She denies any other symptoms. Review of Systems:     Constitutional:  negative for chills, fevers, sweats  Respiratory: Reports resolution of shortness of breath. Negative for cough, dyspnea on exertion, hemoptysis, wheezing  Cardiovascular: Reports resolution of her chest pain.   Negative for lower extremity edema, palpitations  Gastrointestinal:  negative for abdominal pain, constipation, diarrhea, nausea, vomiting  Neurological:  negative for dizziness, headache    Medications: Allergies:  No Known Allergies    Current Meds:   Scheduled Meds:    insulin lispro  0-6 Units Subcutaneous TID WC    insulin lispro  0-3 Units Subcutaneous Nightly    calcium carbonate  500 mg Oral Daily    losartan  50 mg Oral Daily    therapeutic multivitamin-minerals  1 tablet Oral Daily    hydrochlorothiazide  12.5 mg Oral Daily    sodium chloride flush  10 mL Intravenous 2 times per day    famotidine  20 mg Oral BID    metoprolol tartrate  25 mg Oral BID    aspirin  81 mg Oral Daily    enoxaparin  1 mg/kg Subcutaneous BID    atorvastatin  40 mg Oral Nightly     Continuous Infusions:    dextrose       PRN Meds: glucose, dextrose, glucagon (rDNA), dextrose, sodium chloride flush, potassium chloride **OR** potassium alternative oral replacement **OR** potassium chloride, magnesium sulfate, magnesium hydroxide, ondansetron, nitroGLYCERIN, acetaminophen    Data:     Past Medical History:   has a past medical history of Diabetes mellitus ([address] Hyperlipidemia, and Hypertension. Social History:   reports that she quit smoking about 21 years ago. She has a 10.00 pack-year smoking history. She has never used smokeless tobacco. She reports that she does not drink alcohol or use drugs. Family History:   Family History   Problem Relation Age of Onset    No Known Problems Mother     Diabetes Father        Vitals:  /70   Pulse 64   Temp 98.7 °F (37.1 °C) (Oral)   Resp 16   Ht 5' 4\" (1.626 m)   Wt 142 lb 1.6 oz (64.5 kg)   SpO2 97%   BMI 24.39 kg/m²   Temp (24hrs), Av °F (36.7 °C), Min:97.5 °F (36.4 °C), Max:98.7 °F (37.1 °C)    Recent Labs     20  0631 20  1048 20  1516   POCGLU 225* 137* 183* 200*       I/O (24Hr):     Intake/Output Summary (Last 24 hours) at 2020 1547  Last data filed at 1/26/2020 1300  Gross per 24 hour   Intake 480 ml   Output --   Net 480 ml       Labs:  Hematology:  Recent Labs     01/24/20  0535 01/25/20  0722   WBC 4.1 7.7   RBC 4.63 4.56   HGB 14.2 13.8   HCT 42.8 43.9   MCV 92.3 96.3   MCH 30.7 30.3   MCHC 33.2 31.4   RDW 13.7 13.0    321   MPV NOT REPORTED 11.5     Chemistry:  Recent Labs     01/23/20  1745 01/24/20 2010 01/24/20  2308 01/25/20  0722   NA  --   --   --  138   K  --   --   --  4.0   CL  --   --   --  101   CO2  --   --   --  23   GLUCOSE  --   --   --  163*   BUN  --   --   --  20   CREATININE  --   --   --  0.44*   MG  --   --   --  2.1   ANIONGAP  --   --   --  14   LABGLOM  --   --   --  >60   GFRAA  --   --   --  >60   CALCIUM  --   --   --  9.8   PROBNP  --   --   --  <20   TROPHS NOT REPORTED <6 <6  --      Recent Labs     01/24/20  0535  01/25/20  0722 01/25/20  1237 01/25/20  1621 01/25/20  2056 01/26/20  0631 01/26/20  1048 01/26/20  1516   PROT  --   --  6.8  --   --   --   --   --   --    LABALBU  --   --  4.0  --   --   --   --   --   --    LABA1C 7.9*  --   --   --   --   --   --   --   --    AST  --   --  16  --   --   --   --   --   --    ALT  --   --  18  --   --   --   --   --   --    ALKPHOS  --   --  71  --   --   --   --   --   --    BILITOT  --   --  0.26*  --   --   --   --   --   --    CHOL 259*  --   --   --   --   --   --   --   --    HDL 89  --   --   --   --   --   --   --   --    LDLCHOLESTEROL 152*  --   --   --   --   --   --   --   --    CHOLHDLRATIO 2.9  --   --   --   --   --   --   --   --    TRIG 89  --   --   --   --   --   --   --   --    VLDL NOT REPORTED  --   --   --   --   --   --   --   --    POCGLU  --    < >  --  221* 139* 225* 137* 183* 200*    < > = values in this interval not displayed.      ABG:No results found for: POCPH, PHART, PH, POCPCO2, QKH2WTX, PCO2, POCPO2, PO2ART, PO2, POCHCO3, PVB0BRL, HCO3, NBEA, PBEA, BEART, BE, THGBART, THB, TQG1HXX, FXNU6WZB, P3QPIFEY, O2SAT, FIO2  Lab Results

## 2020-01-27 ENCOUNTER — APPOINTMENT (OUTPATIENT)
Dept: CARDIAC CATH/INVASIVE PROCEDURES | Age: 60
DRG: 286 | End: 2020-01-27
Attending: INTERNAL MEDICINE
Payer: COMMERCIAL

## 2020-01-27 PROBLEM — I20.0 UNSTABLE ANGINA (HCC): Status: RESOLVED | Noted: 2020-01-24 | Resolved: 2020-01-27

## 2020-01-27 PROBLEM — R00.1 SINUS BRADYCARDIA: Status: ACTIVE | Noted: 2020-01-27

## 2020-01-27 PROBLEM — I25.10 NONOBSTRUCTIVE ATHEROSCLEROSIS OF CORONARY ARTERY: Status: ACTIVE | Noted: 2020-01-27

## 2020-01-27 LAB
-: NORMAL
ABSOLUTE EOS #: 0.08 K/UL (ref 0–0.44)
ABSOLUTE IMMATURE GRANULOCYTE: <0.03 K/UL (ref 0–0.3)
ABSOLUTE LYMPH #: 1.99 K/UL (ref 1.1–3.7)
ABSOLUTE MONO #: 0.38 K/UL (ref 0.1–1.2)
AMORPHOUS: NORMAL
ANION GAP SERPL CALCULATED.3IONS-SCNC: 12 MMOL/L (ref 9–17)
BACTERIA: NORMAL
BASOPHILS # BLD: 1 % (ref 0–2)
BASOPHILS ABSOLUTE: 0.05 K/UL (ref 0–0.2)
BILIRUBIN URINE: NEGATIVE
BUN BLDV-MCNC: 21 MG/DL (ref 6–20)
BUN/CREAT BLD: ABNORMAL (ref 9–20)
CALCIUM SERPL-MCNC: 9.3 MG/DL (ref 8.6–10.4)
CASTS UA: NORMAL /LPF (ref 0–8)
CHLORIDE BLD-SCNC: 102 MMOL/L (ref 98–107)
CO2: 26 MMOL/L (ref 20–31)
COLOR: YELLOW
COMMENT UA: ABNORMAL
CREAT SERPL-MCNC: 0.6 MG/DL (ref 0.5–0.9)
CRYSTALS, UA: NORMAL /HPF
DIFFERENTIAL TYPE: ABNORMAL
EOSINOPHILS RELATIVE PERCENT: 2 % (ref 1–4)
EPITHELIAL CELLS UA: NORMAL /HPF (ref 0–5)
GFR AFRICAN AMERICAN: >60 ML/MIN
GFR NON-AFRICAN AMERICAN: >60 ML/MIN
GFR SERPL CREATININE-BSD FRML MDRD: ABNORMAL ML/MIN/{1.73_M2}
GFR SERPL CREATININE-BSD FRML MDRD: ABNORMAL ML/MIN/{1.73_M2}
GLUCOSE BLD-MCNC: 153 MG/DL (ref 65–105)
GLUCOSE BLD-MCNC: 159 MG/DL (ref 70–99)
GLUCOSE BLD-MCNC: 163 MG/DL (ref 65–105)
GLUCOSE BLD-MCNC: 173 MG/DL (ref 65–105)
GLUCOSE BLD-MCNC: 186 MG/DL (ref 65–105)
GLUCOSE BLD-MCNC: 278 MG/DL (ref 65–105)
GLUCOSE URINE: ABNORMAL
HCT VFR BLD CALC: 42.4 % (ref 36.3–47.1)
HEMOGLOBIN: 13.3 G/DL (ref 11.9–15.1)
IMMATURE GRANULOCYTES: 0 %
INR BLD: 0.9
KETONES, URINE: NEGATIVE
LEUKOCYTE ESTERASE, URINE: ABNORMAL
LYMPHOCYTES # BLD: 44 % (ref 24–43)
MCH RBC QN AUTO: 30.4 PG (ref 25.2–33.5)
MCHC RBC AUTO-ENTMCNC: 31.4 G/DL (ref 28.4–34.8)
MCV RBC AUTO: 96.8 FL (ref 82.6–102.9)
MONOCYTES # BLD: 8 % (ref 3–12)
MUCUS: NORMAL
NITRITE, URINE: NEGATIVE
NRBC AUTOMATED: 0 PER 100 WBC
OTHER OBSERVATIONS UA: NORMAL
PARTIAL THROMBOPLASTIN TIME: 25.1 SEC (ref 20.5–30.5)
PDW BLD-RTO: 12.9 % (ref 11.8–14.4)
PH UA: 7 (ref 5–8)
PLATELET # BLD: 306 K/UL (ref 138–453)
PLATELET ESTIMATE: ABNORMAL
PMV BLD AUTO: 11 FL (ref 8.1–13.5)
POTASSIUM SERPL-SCNC: 4.1 MMOL/L (ref 3.7–5.3)
PROTEIN UA: NEGATIVE
PROTHROMBIN TIME: 9.6 SEC (ref 9–12)
RBC # BLD: 4.38 M/UL (ref 3.95–5.11)
RBC # BLD: ABNORMAL 10*6/UL
RBC UA: NORMAL /HPF (ref 0–4)
RENAL EPITHELIAL, UA: NORMAL /HPF
SEG NEUTROPHILS: 45 % (ref 36–65)
SEGMENTED NEUTROPHILS ABSOLUTE COUNT: 2.05 K/UL (ref 1.5–8.1)
SODIUM BLD-SCNC: 140 MMOL/L (ref 135–144)
SPECIFIC GRAVITY UA: 1.05 (ref 1–1.03)
TRICHOMONAS: NORMAL
TURBIDITY: CLEAR
URINE HGB: NEGATIVE
UROBILINOGEN, URINE: NORMAL
WBC # BLD: 4.6 K/UL (ref 3.5–11.3)
WBC # BLD: ABNORMAL 10*3/UL
WBC UA: NORMAL /HPF (ref 0–5)
YEAST: NORMAL

## 2020-01-27 PROCEDURE — 2580000003 HC RX 258: Performed by: NURSE PRACTITIONER

## 2020-01-27 PROCEDURE — 87086 URINE CULTURE/COLONY COUNT: CPT

## 2020-01-27 PROCEDURE — 93458 L HRT ARTERY/VENTRICLE ANGIO: CPT | Performed by: INTERNAL MEDICINE

## 2020-01-27 PROCEDURE — 85025 COMPLETE CBC W/AUTO DIFF WBC: CPT

## 2020-01-27 PROCEDURE — 6370000000 HC RX 637 (ALT 250 FOR IP): Performed by: INTERNAL MEDICINE

## 2020-01-27 PROCEDURE — 85730 THROMBOPLASTIN TIME PARTIAL: CPT

## 2020-01-27 PROCEDURE — B2111ZZ FLUOROSCOPY OF MULTIPLE CORONARY ARTERIES USING LOW OSMOLAR CONTRAST: ICD-10-PCS | Performed by: INTERNAL MEDICINE

## 2020-01-27 PROCEDURE — 2709999900 HC NON-CHARGEABLE SUPPLY

## 2020-01-27 PROCEDURE — C1887 CATHETER, GUIDING: HCPCS

## 2020-01-27 PROCEDURE — B2151ZZ FLUOROSCOPY OF LEFT HEART USING LOW OSMOLAR CONTRAST: ICD-10-PCS | Performed by: INTERNAL MEDICINE

## 2020-01-27 PROCEDURE — 93226 XTRNL ECG REC<48 HR SCAN A/R: CPT

## 2020-01-27 PROCEDURE — 6370000000 HC RX 637 (ALT 250 FOR IP): Performed by: NURSE PRACTITIONER

## 2020-01-27 PROCEDURE — 5A2204Z RESTORATION OF CARDIAC RHYTHM, SINGLE: ICD-10-PCS | Performed by: INTERNAL MEDICINE

## 2020-01-27 PROCEDURE — 85610 PROTHROMBIN TIME: CPT

## 2020-01-27 PROCEDURE — 2060000000 HC ICU INTERMEDIATE R&B

## 2020-01-27 PROCEDURE — 81001 URINALYSIS AUTO W/SCOPE: CPT

## 2020-01-27 PROCEDURE — 80048 BASIC METABOLIC PNL TOTAL CA: CPT

## 2020-01-27 PROCEDURE — 6360000004 HC RX CONTRAST MEDICATION

## 2020-01-27 PROCEDURE — 6360000002 HC RX W HCPCS

## 2020-01-27 PROCEDURE — C1769 GUIDE WIRE: HCPCS

## 2020-01-27 PROCEDURE — 6360000002 HC RX W HCPCS: Performed by: INTERNAL MEDICINE

## 2020-01-27 PROCEDURE — 36415 COLL VENOUS BLD VENIPUNCTURE: CPT

## 2020-01-27 PROCEDURE — 99232 SBSQ HOSP IP/OBS MODERATE 35: CPT | Performed by: INTERNAL MEDICINE

## 2020-01-27 PROCEDURE — 93225 XTRNL ECG REC<48 HRS REC: CPT

## 2020-01-27 PROCEDURE — 2500000003 HC RX 250 WO HCPCS

## 2020-01-27 PROCEDURE — C1894 INTRO/SHEATH, NON-LASER: HCPCS

## 2020-01-27 PROCEDURE — 2580000003 HC RX 258: Performed by: INTERNAL MEDICINE

## 2020-01-27 PROCEDURE — C1725 CATH, TRANSLUMIN NON-LASER: HCPCS

## 2020-01-27 RX ORDER — ATORVASTATIN CALCIUM 40 MG/1
40 TABLET, FILM COATED ORAL NIGHTLY
Qty: 30 TABLET | Refills: 0 | Status: SHIPPED | OUTPATIENT
Start: 2020-01-27 | End: 2020-10-01

## 2020-01-27 RX ORDER — ACETAMINOPHEN 325 MG/1
650 TABLET ORAL EVERY 4 HOURS PRN
Status: DISCONTINUED | OUTPATIENT
Start: 2020-01-27 | End: 2020-01-28 | Stop reason: HOSPADM

## 2020-01-27 RX ORDER — SODIUM CHLORIDE 9 MG/ML
INJECTION, SOLUTION INTRAVENOUS CONTINUOUS
Status: DISCONTINUED | OUTPATIENT
Start: 2020-01-27 | End: 2020-01-28 | Stop reason: HOSPADM

## 2020-01-27 RX ORDER — SODIUM CHLORIDE 0.9 % (FLUSH) 0.9 %
10 SYRINGE (ML) INJECTION PRN
Status: DISCONTINUED | OUTPATIENT
Start: 2020-01-27 | End: 2020-01-28 | Stop reason: HOSPADM

## 2020-01-27 RX ORDER — SODIUM CHLORIDE 0.9 % (FLUSH) 0.9 %
10 SYRINGE (ML) INJECTION EVERY 12 HOURS SCHEDULED
Status: DISCONTINUED | OUTPATIENT
Start: 2020-01-27 | End: 2020-01-28 | Stop reason: HOSPADM

## 2020-01-27 RX ORDER — ASPIRIN 81 MG/1
81 TABLET ORAL DAILY
Qty: 30 TABLET | Refills: 0 | Status: SHIPPED | OUTPATIENT
Start: 2020-01-28 | End: 2020-11-13 | Stop reason: SDUPTHER

## 2020-01-27 RX ORDER — NITROGLYCERIN 0.4 MG/1
TABLET SUBLINGUAL
Qty: 25 TABLET | Refills: 0 | Status: SHIPPED | OUTPATIENT
Start: 2020-01-27 | End: 2020-05-12 | Stop reason: ALTCHOICE

## 2020-01-27 RX ADMIN — INSULIN LISPRO 2 UNITS: 100 INJECTION, SOLUTION INTRAVENOUS; SUBCUTANEOUS at 20:06

## 2020-01-27 RX ADMIN — DESMOPRESSIN ACETATE 40 MG: 0.2 TABLET ORAL at 20:06

## 2020-01-27 RX ADMIN — FAMOTIDINE 20 MG: 20 TABLET, FILM COATED ORAL at 07:30

## 2020-01-27 RX ADMIN — ASPIRIN 81 MG: 81 TABLET, COATED ORAL at 10:05

## 2020-01-27 RX ADMIN — SODIUM CHLORIDE, PRESERVATIVE FREE 10 ML: 5 INJECTION INTRAVENOUS at 07:31

## 2020-01-27 RX ADMIN — CALCIUM CARBONATE (ANTACID) CHEW TAB 500 MG 500 MG: 500 CHEW TAB at 07:30

## 2020-01-27 RX ADMIN — METOPROLOL TARTRATE 25 MG: 25 TABLET ORAL at 07:30

## 2020-01-27 RX ADMIN — HYDROCHLOROTHIAZIDE 12.5 MG: 12.5 CAPSULE ORAL at 07:30

## 2020-01-27 RX ADMIN — SODIUM CHLORIDE: 9 INJECTION, SOLUTION INTRAVENOUS at 17:09

## 2020-01-27 RX ADMIN — LOSARTAN POTASSIUM 50 MG: 50 TABLET, FILM COATED ORAL at 07:30

## 2020-01-27 RX ADMIN — MULTIPLE VITAMINS W/ MINERALS TAB 1 TABLET: TAB at 07:30

## 2020-01-27 RX ADMIN — FAMOTIDINE 20 MG: 20 TABLET, FILM COATED ORAL at 20:06

## 2020-01-27 RX ADMIN — ENOXAPARIN SODIUM 60 MG: 60 INJECTION SUBCUTANEOUS at 20:06

## 2020-01-27 ASSESSMENT — PAIN SCALES - GENERAL
PAINLEVEL_OUTOF10: 0
PAINLEVEL_OUTOF10: 0

## 2020-01-27 ASSESSMENT — PAIN SCALES - WONG BAKER
WONGBAKER_NUMERICALRESPONSE: 0
WONGBAKER_NUMERICALRESPONSE: 0

## 2020-01-27 NOTE — PROCEDURES
[place] [address]                              CARDIAC STRESS TEST    PATIENT NAME: Abbe Medina                     :        1960  MED REC NO:   [id number]                              ROOM:       9176  ACCOUNT NO:   [de-identified]                           ADMIT DATE: 2020  PROVIDER:     Ruthann Dakins. Ahmavarsha      DATE OF STUDY:  2020    Cardiovascular Diagnostics Department    Ordering Provider:  Eli Walker MD    Primary Care Provider:  Amira Do MD    Interpreting Physician:  Ruthann Dakins. Alka Fuentes MD    MYOCARDIAL PERFUSION STRESS IMAGING    The stress ECG results are reported separately. NUCLEAR IMAGING RESULTS:  The overall quality of the study is fair. Mild attenuation artifact was seen. There is no evidence of abnormal  lung uptake. Additionally, the right ventricle appears normal.  The  left ventricular cavity is noted to be normal in size on stress images. There is no evidence of transient ischemic dilatation (TID) of the left  ventricle. Gated SPECT imaging reveals normal myocardial thickening and wall motion  with a calculated left ventricular ejection fraction (EF) of 77%. The rest images demonstrate homogenous tracer distribution throughout  the myocardium. On stress imaging, a moderate perfusion abnormality of mild/moderate  intensity was noted in the inferior, apical and inferoapical region(s). IMPRESSION:  1. Abnormal myocardial perfusion study. There is a moderate perfusion  defect of mild/moderate intensity in the inferior, apical and  inferoapical region(s) during stress imaging, which is most consistent  with ischemia. 2.  Global left ventricular systolic function was normal with an EF of  77% without regional wall motion abnormalities.     Overall these results are most consistent with an intermediate risk for  significant coronary artery disease. Additional testing including cardiac catheterization may be indicated. The results of this test were discussed with Dr. Orion Cueva on 01/24/2020  at 1240. JOHNNY KIRBY    D: 01/27/2020 7:41:56       T: 01/27/2020 7:43:05     PAUL_EDIT  Job#: [id number]     Doc#: Unknown    CC:  Daniel Mortensen

## 2020-01-27 NOTE — PLAN OF CARE
[address]    Second Visit Note  For more detailed information please refer to the progress note of the day      1/27/2020    5:51 PM    Name:   Niesha Del Rio  MRN:     [id number]     Sophieide:      [de-identified]   Room:   [facility]   Day:  3  Admit Date:  1/24/2020  7:23 PM    PCP:   Naga Maloney MD  Code Status:  Full Code      Pt vitals were reviewed   New labs were reviewed   Patient was seen    Seen after cath. VT arrest s/p CV.   Non-obstructing CAD    Updated plan :     - Bradycardic, s/p VT and cardioversion  - Monitor overnight  - Meds sent to her pharmacy  - Check UA - patient complaining of urinary freq  - DC in am      33 Aretha Ramires DO  1/27/2020  5:51 PM

## 2020-01-27 NOTE — DISCHARGE INSTR - DIET

## 2020-01-27 NOTE — PROGRESS NOTES
[facility]  Progress Note                   Date:   1/27/2020  Patient name: Joesph Rodarte  Date of admission:  1/24/2020  7:23 PM  MRN:   [id number]  YOB: 1960  PCP: Anselmo Monroe MD    Reason for Admission: Unstable angina ([facility] [address] [I20.0]    Subjective:       Clinical Changes /Abnormalities  Patient seen and examined with  in room. Discussed plans for cardiac catheterization today. She denies CP, SOB, or concerns overnight. SB on monitor HR in the 50s-60s. Review of Systems    Medications:   Scheduled Meds:   insulin lispro  0-6 Units Subcutaneous TID WC    insulin lispro  0-3 Units Subcutaneous Nightly    calcium carbonate  500 mg Oral Daily    losartan  50 mg Oral Daily    therapeutic multivitamin-minerals  1 tablet Oral Daily    hydrochlorothiazide  12.5 mg Oral Daily    sodium chloride flush  10 mL Intravenous 2 times per day    famotidine  20 mg Oral BID    metoprolol tartrate  25 mg Oral BID    aspirin  81 mg Oral Daily    enoxaparin  1 mg/kg Subcutaneous BID    atorvastatin  40 mg Oral Nightly     Continuous Infusions:   dextrose       CBC:   Recent Labs     01/25/20  0722 01/27/20  0645   WBC 7.7 4.6   HGB 13.8 13.3    306     BMP:    Recent Labs     01/25/20  0722 01/27/20  0645    140   K 4.0 4.1    102   CO2 23 26   BUN 20 21*   CREATININE 0.44* 0.60   GLUCOSE 163* 159*     Hepatic:  Recent Labs     01/25/20  0722   AST 16   ALT 18   BILITOT 0.26*   ALKPHOS 71     Troponin:   Recent Labs     01/24/20 2010 01/24/20  2308   TROPHS <6 <6     BNP: No results for input(s): BNP in the last 72 hours. Lipids: No results for input(s): CHOL, HDL in the last 72 hours.     Invalid input(s): LDLCALCU  INR:   Recent Labs     01/27/20  0645   INR 0.9     DATA  Diagnostics:    EKG: NSR     ECHO: Fraction 60%, mild LVH, no valvular regurgitation or stenosis     Stress Test showed inferior apical ischemia  Objective:   Vitals: /81 Pulse 65   Temp 97.7 °F (36.5 °C) (Oral)   Resp 16   Ht 5' 4\" (1.626 m)   Wt 143 lb 14.4 oz (65.3 kg)   SpO2 96%   BMI 24.70 kg/m²   General appearance: alert and cooperative with exam  HEENT: Head: Normocephalic, no lesions, without obvious abnormality. Neck:no JVD, trachea midline, no adenopathy  Lungs: Clear to auscultation  Heart: Regular rate and rhythm, s1/s2 auscultated, no murmurs. SB  Abdomen: soft, non-tender, bowel sounds active  Extremities: no edema  Neurologic: not done        Assessment / Acute Cardiac Problems:   1. Unstable angina  2. HTN  3. Type 2 DM    Patient Active Problem List:     Type 2 diabetes mellitus without complication, without long-term current use of insulin (HCC)     Mixed hyperlipidemia     Essential hypertension     Anxiety     Noncompliance with medications     Other chest pain     Chest pain     Unstable angina (HCC)     Diabetes mellitus ([facility] [address]     Hyperlipidemia     Hypertension      Plan of Treatment:   1. Unstable angina currently chest pain free. Positive stress test.  Cardiac catheterization per Dr. Carmelo Healy recommendation planned for today. Questions and concerns answered. NPO. Patient and  agreeable to proceed.       Electronically signed by BRENDA Henriquez NP on 1/27/2020 at 9:06 AM  [address]  [phone]

## 2020-01-27 NOTE — FLOWSHEET NOTE
Martha Hightower was hooked up to a 48-hour Holter (240) with verbal as well as written instructions. Her  said he would bring it back when offered a .

## 2020-01-27 NOTE — PROGRESS NOTES
Johnson Hung 19    Progress Note    1/27/2020    3:12 PM    Name:   Kym Mohs  MRN:     [id number]     Kailynlyside:      [de-identified]   Room:   [address] [place] [address] Day:  3  Admit Date:  1/24/2020  7:23 PM    PCP:   Jina Colón MD  Code Status:  Full Code    Subjective:     C/C: Worsening shortness of breath / chest discomfort    Interval History Status: unchanged    Patient was seen and examined this morning. No acute events overnight. Awaiting to go to cardiac cath this morning. No shortness of breath or chest pain. She is bradycardic this morning (41 bpm). Denies lightheadedness, dizziness. Brief History:     Per my associate overnight:    Kym Mohs is a 64 y.o.  female who was transferred from [facility] where she presented with intermittent mid-sternal and left upper chest pain and shortness of breath over the past 3-4 weeks and is admitted to the hospital for the management of Unstable angina ([address] She describes the chest pain as a moderate ache and rates it as a 5 out of 10. She states that it radiates to her neck and left shoulder at times and has also had intermittent associated episodes of diuresis, nausea and dizziness. [facility] consistent with inferior apical ischemia. Cardiac enzymes and chest x-ray were unremarkable. Cardiac echo showed normal LV function with an EF 55%. She was transferred here for cardiac cath. During my assessment she is currently free of chest pain and just complains of fatigue. She denies any other symptoms. Review of Systems:     Constitutional:  negative for chills, fevers, sweats  Respiratory: Reports resolution of shortness of breath. Negative for cough, dyspnea on exertion, hemoptysis, wheezing  Cardiovascular: Reports resolution of her chest pain.   Negative for lower extremity edema, palpitations  Gastrointestinal: negative for abdominal pain, constipation, diarrhea, nausea, vomiting  Neurological:  negative for dizziness, headache    Medications: Allergies:  No Known Allergies    Current Meds:   Scheduled Meds:    metoprolol tartrate  12.5 mg Oral BID    insulin lispro  0-6 Units Subcutaneous TID WC    insulin lispro  0-3 Units Subcutaneous Nightly    calcium carbonate  500 mg Oral Daily    losartan  50 mg Oral Daily    therapeutic multivitamin-minerals  1 tablet Oral Daily    hydrochlorothiazide  12.5 mg Oral Daily    sodium chloride flush  10 mL Intravenous 2 times per day    famotidine  20 mg Oral BID    aspirin  81 mg Oral Daily    enoxaparin  1 mg/kg Subcutaneous BID    atorvastatin  40 mg Oral Nightly     Continuous Infusions:    dextrose       PRN Meds: glucose, dextrose, glucagon (rDNA), dextrose, sodium chloride flush, potassium chloride **OR** potassium alternative oral replacement **OR** potassium chloride, magnesium sulfate, magnesium hydroxide, ondansetron, nitroGLYCERIN, acetaminophen    Data:     Past Medical History:   has a past medical history of Diabetes mellitus ([address] Hyperlipidemia, and Hypertension. Social History:   reports that she quit smoking about 21 years ago. She has a 10.00 pack-year smoking history. She has never used smokeless tobacco. She reports that she does not drink alcohol or use drugs. Family History:   Family History   Problem Relation Age of Onset    No Known Problems Mother     Diabetes Father        Vitals:  /72   Pulse 51   Temp 98.3 °F (36.8 °C) (Oral)   Resp 16   Ht 5' 4\" (1.626 m)   Wt 143 lb 14.4 oz (65.3 kg)   SpO2 96%   BMI 24.70 kg/m²   Temp (24hrs), Av.1 °F (36.7 °C), Min:97.6 °F (36.4 °C), Max:98.6 °F (37 °C)    Recent Labs     20  1516 20  0633 20  1059   POCGLU 200* 149* 153* 163*       I/O (24Hr):     Intake/Output Summary (Last 24 hours) at 2020 1512  Last data filed at 2020 0740  Gross per 24 hour   Intake 820 ml   Output 400 ml   Net 420 ml       Labs:  Hematology:  Recent Labs     01/25/20  0722 01/27/20  0645   WBC 7.7 4.6   RBC 4.56 4.38   HGB 13.8 13.3   HCT 43.9 42.4   MCV 96.3 96.8   MCH 30.3 30.4   MCHC 31.4 31.4   RDW 13.0 12.9    306   MPV 11.5 11.0   INR  --  0.9     Chemistry:  Recent Labs     01/24/20 2010 01/24/20 2308 01/25/20 0722 01/27/20  0645   NA  --   --  138 140   K  --   --  4.0 4.1   CL  --   --  101 102   CO2  --   --  23 26   GLUCOSE  --   --  163* 159*   BUN  --   --  20 21*   CREATININE  --   --  0.44* 0.60   MG  --   --  2.1  --    ANIONGAP  --   --  14 12   LABGLOM  --   --  >60 >60   GFRAA  --   --  >60 >60   CALCIUM  --   --  9.8 9.3   PROBNP  --   --  <20  --    TROPHS <6 <6  --   --      Recent Labs     01/25/20  0722  01/26/20  0631 01/26/20  1048 01/26/20  1516 01/26/20  2053 01/27/20  0633 01/27/20  1059   PROT 6.8  --   --   --   --   --   --   --    LABALBU 4.0  --   --   --   --   --   --   --    AST 16  --   --   --   --   --   --   --    ALT 18  --   --   --   --   --   --   --    ALKPHOS 71  --   --   --   --   --   --   --    BILITOT 0.26*  --   --   --   --   --   --   --    POCGLU  --    < > 137* 183* 200* 149* 153* 163*    < > = values in this interval not displayed.      ABG:No results found for: POCPH, PHART, PH, POCPCO2, MAS0TGX, PCO2, POCPO2, PO2ART, PO2, POCHCO3, TWV9NBC, HCO3, NBEA, PBEA, BEART, BE, THGBART, THB, IAX0QVU, VARY8FTX, N3RIYQVK, O2SAT, FIO2  Lab Results   Component Value Date/Time    SPECIAL NOT REPORTED 01/23/2020 11:45 AM     Lab Results   Component Value Date/Time    CULTURE NO SIGNIFICANT GROWTH 01/23/2020 11:45 AM       Radiology[facility]  [facility] (2 Vw[facility]    Result Date: 1/23/2020  Negative chest.       Physical Examination:        General appearance:  alert, cooperative and no distress,  female sitting up in bed  Mental Status:  oriented to person, place and time and normal affect  Lungs:  clear to auscultation bilaterally, normal effort  Heart:  regular rate and rhythm, no murmur  Abdomen:  soft, nontender, nondistended, normal bowel sounds, no masses, hepatomegaly, splenomegaly  Extremities:  no edema, redness, tenderness in the calves  Skin:  no gross lesions, rashes, induration    Assessment:        Hospital Problems           Last Modified POA    * (Principal) Unstable angina ([facility] 1/25/2020 Yes    Sinus bradycardia 1/27/2020 No    Type 2 diabetes mellitus without complication, without long-term current use of insulin ([facility] 1/25/2020 Yes    Mixed hyperlipidemia 1/25/2020 Yes    Essential hypertension 1/25/2020 Yes    Anxiety 1/25/2020 Yes          Plan:        1. Appreciate cardiology recommendations  2. Plan for cath today  3. Lovenox for ACS protocol  4. Continue aspirin, decrease lopressor to 12.5 mg BID  5. Continue Lipitor 40 mg qhs  6. Continue ISS; hold amaryl and metformin while in house  7. Labs reviewed - WNL  8. Monitor telemetry - bradycardia noted; decreased metoprolol tartrate  9. Diabetic/cardiac diet  10.  Possible discharge, pending findings on cardiac cath    [facility], DO  1/27/2020  3:12 PM

## 2020-01-28 VITALS
OXYGEN SATURATION: 98 % | HEIGHT: 64 IN | HEART RATE: 57 BPM | BODY MASS INDEX: 24.62 KG/M2 | TEMPERATURE: 98.3 F | DIASTOLIC BLOOD PRESSURE: 76 MMHG | RESPIRATION RATE: 16 BRPM | SYSTOLIC BLOOD PRESSURE: 130 MMHG | WEIGHT: 144.2 LBS

## 2020-01-28 LAB
ABSOLUTE EOS #: 0.1 K/UL (ref 0–0.44)
ABSOLUTE IMMATURE GRANULOCYTE: <0.03 K/UL (ref 0–0.3)
ABSOLUTE LYMPH #: 1.68 K/UL (ref 1.1–3.7)
ABSOLUTE MONO #: 0.57 K/UL (ref 0.1–1.2)
BASOPHILS # BLD: 0 % (ref 0–2)
BASOPHILS ABSOLUTE: 0.03 K/UL (ref 0–0.2)
CULTURE: NORMAL
DIFFERENTIAL TYPE: ABNORMAL
EOSINOPHILS RELATIVE PERCENT: 1 % (ref 1–4)
GLUCOSE BLD-MCNC: 173 MG/DL (ref 65–105)
GLUCOSE BLD-MCNC: 186 MG/DL (ref 65–105)
HCT VFR BLD CALC: 39.9 % (ref 36.3–47.1)
HEMOGLOBIN: 12.9 G/DL (ref 11.9–15.1)
IMMATURE GRANULOCYTES: 0 %
LYMPHOCYTES # BLD: 23 % (ref 24–43)
Lab: NORMAL
MCH RBC QN AUTO: 31.3 PG (ref 25.2–33.5)
MCHC RBC AUTO-ENTMCNC: 32.3 G/DL (ref 28.4–34.8)
MCV RBC AUTO: 96.8 FL (ref 82.6–102.9)
MONOCYTES # BLD: 8 % (ref 3–12)
NRBC AUTOMATED: 0 PER 100 WBC
PDW BLD-RTO: 12.9 % (ref 11.8–14.4)
PLATELET # BLD: 340 K/UL (ref 138–453)
PLATELET ESTIMATE: ABNORMAL
PMV BLD AUTO: 11.5 FL (ref 8.1–13.5)
RBC # BLD: 4.12 M/UL (ref 3.95–5.11)
RBC # BLD: ABNORMAL 10*6/UL
SEG NEUTROPHILS: 67 % (ref 36–65)
SEGMENTED NEUTROPHILS ABSOLUTE COUNT: 4.89 K/UL (ref 1.5–8.1)
SPECIMEN DESCRIPTION: NORMAL
WBC # BLD: 7.3 K/UL (ref 3.5–11.3)
WBC # BLD: ABNORMAL 10*3/UL

## 2020-01-28 PROCEDURE — 6370000000 HC RX 637 (ALT 250 FOR IP): Performed by: INTERNAL MEDICINE

## 2020-01-28 PROCEDURE — 85025 COMPLETE CBC W/AUTO DIFF WBC: CPT

## 2020-01-28 PROCEDURE — 99239 HOSP IP/OBS DSCHRG MGMT >30: CPT | Performed by: INTERNAL MEDICINE

## 2020-01-28 PROCEDURE — 82947 ASSAY GLUCOSE BLOOD QUANT: CPT

## 2020-01-28 PROCEDURE — 36415 COLL VENOUS BLD VENIPUNCTURE: CPT

## 2020-01-28 PROCEDURE — 6360000002 HC RX W HCPCS: Performed by: INTERNAL MEDICINE

## 2020-01-28 PROCEDURE — 6370000000 HC RX 637 (ALT 250 FOR IP): Performed by: NURSE PRACTITIONER

## 2020-01-28 RX ADMIN — INSULIN LISPRO 1 UNITS: 100 INJECTION, SOLUTION INTRAVENOUS; SUBCUTANEOUS at 08:33

## 2020-01-28 RX ADMIN — HYDROCHLOROTHIAZIDE 12.5 MG: 12.5 CAPSULE ORAL at 08:32

## 2020-01-28 RX ADMIN — METOPROLOL TARTRATE 12.5 MG: 25 TABLET ORAL at 08:31

## 2020-01-28 RX ADMIN — FAMOTIDINE 20 MG: 20 TABLET, FILM COATED ORAL at 08:31

## 2020-01-28 RX ADMIN — MULTIPLE VITAMINS W/ MINERALS TAB 1 TABLET: TAB at 08:31

## 2020-01-28 RX ADMIN — INSULIN LISPRO 1 UNITS: 100 INJECTION, SOLUTION INTRAVENOUS; SUBCUTANEOUS at 11:35

## 2020-01-28 RX ADMIN — LOSARTAN POTASSIUM 50 MG: 50 TABLET, FILM COATED ORAL at 08:32

## 2020-01-28 RX ADMIN — ENOXAPARIN SODIUM 60 MG: 60 INJECTION SUBCUTANEOUS at 08:31

## 2020-01-28 RX ADMIN — CALCIUM CARBONATE (ANTACID) CHEW TAB 500 MG 500 MG: 500 CHEW TAB at 08:32

## 2020-01-28 RX ADMIN — ASPIRIN 81 MG: 81 TABLET, COATED ORAL at 08:32

## 2020-01-28 ASSESSMENT — PAIN SCALES - WONG BAKER: WONGBAKER_NUMERICALRESPONSE: 0

## 2020-01-28 ASSESSMENT — PAIN SCALES - GENERAL: PAINLEVEL_OUTOF10: 0

## 2020-01-28 NOTE — PROGRESS NOTES
Johnson Hung 19    Progress Note    1/28/2020    11:26 AM    Name:   Mehran Gallardo  MRN:     [id number]     Illbarrie Prom:      [de-identified]   Room:   [address] [place] [address] Day:  4  Admit Date:  1/24/2020  7:23 PM    PCP:   Ramos Baca MD  Code Status:  Full Code    Subjective:     C/C:Shortness of breath     Interval History Status: not changed. No acute issues overnight  Cath yesterday, no complaints  Chest pain resolved        Brief History:     Clarice Rowley a 64 y.o.  female who was transferred from [facility] where she presented with intermittent mid-sternal and left upper chest pain and shortness of breath over the past 3-4 weeks and is admitted to the hospital for the management of Unstable angina ([address]   She describes the chest pain as a moderate ache and rates it as a 5 out of 10.  She states that it radiates to her neck and left shoulder at times and has also had intermittent associated episodes of diuresis, nausea and dizziness.  Cardiac [facility] at [facility] positive Lexiscan stress test consistent with inferior apical ischemia.  Cardiac enzymes and chest x-ray were unremarkable.  Cardiac echo showed normal LV function with an EF 55%.  She was transferred here for cardiac cath.  During my assessment she is currently free of chest pain and just complains of fatigue.  She denies any other symptoms. Review of Systems:     Constitutional:  negative for chills, fevers, sweats  Respiratory:  negative for cough, dyspnea on exertion, shortness of breath, wheezing  Cardiovascular:  negative for chest pain, chest pressure/discomfort  Gastrointestinal:  negative for abdominal pain, constipation, diarrhea, nausea, vomiting  Neurological:  negative for dizziness, headache    Medications:      Allergies:  No Known Allergies    Current Meds:   Scheduled Meds:    metoprolol tartrate  12.5 mg Oral BID    sodium chloride flush  10 mL WBC 4.6 7.3   RBC 4.38 4.12   HGB 13.3 12.9   HCT 42.4 39.9   MCV 96.8 96.8   MCH 30.4 31.3   MCHC 31.4 32.3   RDW 12.9 12.9    340   MPV 11.0 11.5   INR 0.9  --      Chemistry:  Recent Labs     01/27/20  0645      K 4.1      CO2 26   GLUCOSE 159*   BUN 21*   CREATININE 0.60   ANIONGAP 12   LABGLOM >60   GFRAA >60   CALCIUM 9.3     Recent Labs     01/27/20  0633 01/27/20  1059 01/27/20  1654 01/27/20  1905 01/28/20  0743 01/28/20  1038   POCGLU 153* 163* 173* 278* 186* 173*     ABG:No results found for: POCPH, PHART, PH, POCPCO2, SQF6OIZ, PCO2, POCPO2, PO2ART, PO2, POCHCO3, RRH6JMV, HCO3, NBEA, PBEA, BEART, BE, THGBART, THB, WTV8MTN, UNRE4OAZ, G0ABQTFB, O2SAT, FIO2  Lab Results   Component Value Date/Time    SPECIAL NOT REPORTED 01/23/2020 11:45 AM     Lab Results   Component Value Date/Time    CULTURE NO SIGNIFICANT GROWTH 01/23/2020 11:45 AM       Radiology:  [facility] Chest Standard (2 Vw)    Result Date: 1/23/2020  Negative chest.       Physical Examination:        General appearance:  alert, cooperative and no distress  Mental Status:  oriented to person, place and time and normal affect  Lungs:  clear to auscultation bilaterally, normal effort  Heart:  regular rate and rhythm  Abdomen:  soft, nontender, nondistended, normal bowel sounds  Extremities:  no edema, redness, tenderness in the calves  Skin:  no gross lesions, rashes, induration    Assessment:        Hospital Problems           Last Modified POA    Sinus bradycardia 1/27/2020 No    Nonobstructive atherosclerosis of coronary artery 1/27/2020 Clinically Undetermined    Type 2 diabetes mellitus without complication, without long-term current use of insulin ([facility] 1/27/2020 Yes    Mixed hyperlipidemia 1/27/2020 Yes    Essential hypertension 1/27/2020 Yes    Anxiety 1/27/2020 Yes          Plan:        - Cardiology consulted - s/p cath: normal  - Continue current medications  - ISS  - Continue home medications  - DC planning  - Letter for

## 2020-01-28 NOTE — PLAN OF CARE
Problem: Falls - Risk of:  Goal: Will remain free from falls  Description  Will remain free from falls  Outcome: Met This Shift  Goal: Absence of physical injury  Description  Absence of physical injury  Outcome: Met This Shift      Pt remained free of falls this shift. Bed remains in lowest position, with 2/4 side rails up and all wheels locked. Non skid socks on. Bedside table and call light within reach. Pt calls out appropriately. Will continue to monitor.     Electronically signed by Octavia Oscar RN on 1/28/2020 at 6:04 AM

## 2020-01-28 NOTE — PROCEDURES
[place] [id number]                              CARDIAC STRESS TEST    PATIENT NAME: Kamala Manzano                     :        1960  MED REC NO:   [id number]                              ROOM:       5343  ACCOUNT NO:   [de-identified]                           ADMIT DATE: 2020  PROVIDER:     Yossi Ott      DATE OF STUDY:  2020    LEXISCAN CARDIOLITE STRESS TEST:    REASON FOR TEST:  Chest pain, unable to walk treadmill. IMPRESSION:  1. We gave 0.4 mg of Lexiscan intravenously. 2.  This was followed in 20 seconds by Cardiolite infusion. 3.  There was no chest pain. 4.  There was no ST depression. 5.  It was an overall negative Lexiscan stress test.  6.  Cardiolite to follow.         Dany Preciado    D: 2020 4:32:38       T: 2020 5:19:42     [email or web address]  Job#: [id number]     Doc#: [id number]    CC:  Fly Reynolds

## 2020-01-28 NOTE — PLAN OF CARE
Problem: Pain:  Goal: Pain level will decrease  Description  Pain level will decrease  Outcome: Ongoing  Goal: Control of acute pain  Description  Control of acute pain  Outcome: Ongoing  Goal: Control of chronic pain  Description  Control of chronic pain  Outcome: Ongoing     Problem:  Activity:  Goal: Ability to tolerate increased activity will improve  Description  Ability to tolerate increased activity will improve  Outcome: Ongoing     Electronically signed by Moshe Smith RN on 1/27/2020 at 10:58 PM

## 2020-01-29 NOTE — ADT AUTH CERT
Angina - Care Day 4 (1/27/2020) by Juana Lockwood RN         Review Status Review Entered   Completed 1/29/2020 10:44       Criteria Review      Care Day: 4 Care Date: 1/27/2020 Level of Care:    Guideline Day 2    Level Of Care    (X) Intermediate care or telemetry to discharge    (X) Complete discharge planning    Clinical Status    ( ) * Hemodynamic stability    (X) * Dangerous arrhythmia absent    (X) * Chest pain, dyspnea, or anginal equivalent absent    (X) * No evidence of bleeding or myocardial ischemia    ( ) * Vascular access site without evidence of infection, aneurysm, or growing hematoma    (X) * Renal function at baseline or acceptable for next level of care    ( ) * Discharge plans and education understood    Activity    (X) * Ambulatory    Routes    (X) * Oral hydration, medications, and diet    (X) Heart-healthy diet    Interventions    (X) * Oxygen absent    (X) Cardiac monitoring    (X) Possible cardiac angiography, with percutaneous coronary intervention if indicated [D]    Medications    (X) * Anticoagulation absent    * Milestone   Additional Notes   1/27/2020   Clinical Changes /Abnormalities  Patient seen and examined with  in room.  Discussed plans for cardiac catheterization today.  She denies CP, SOB, or concerns overnight.   SB on monitor HR in the 50s-60s.       Scheduled Medications    · insulin lispro  0-6 Units Subcutaneous TID WC   · insulin lispro  0-3 Units Subcutaneous Nightly   · calcium carbonate  500 mg Oral Daily   · losartan  50 mg Oral Daily   · therapeutic multivitamin-minerals  1 tablet Oral Daily   · hydrochlorothiazide  12.5 mg Oral Daily   · sodium chloride flush  10 mL Intravenous 2 times per day   · famotidine  20 mg Oral BID   · metoprolol tartrate  25 mg Oral BID   · aspirin  81 mg Oral Daily   · enoxaparin  1 mg/kg Subcutaneous BID   · atorvastatin  40 mg Oral Nightly    NS 75 ml/hr          Recent Labs     01/25/20   0722 01/27/20   0645   WBC 7.7 4.6   HGB Medications    (X) * Anticoagulation absent    * Milestone   Additional Notes   2020   C/C: Worsening shortness of breath / chest discomfort    Interval History Status: unchanged    Patient was seen and examined this afternoon. The patient and her  are seen in the vending machine area. No other patients around. She reports no shortness of breath, nausea, vomiting. No recurrent chest pain. No dyspnea with exertion. Vitals stable.       Scheduled Medications    · insulin lispro  0-6 Units Subcutaneous TID WC   · insulin lispro  0-3 Units Subcutaneous Nightly   · calcium carbonate  500 mg Oral Daily   · losartan  50 mg Oral Daily   · therapeutic multivitamin-minerals  1 tablet Oral Daily   · hydrochlorothiazide  12.5 mg Oral Daily   · sodium chloride flush  10 mL Intravenous 2 times per day   · famotidine  20 mg Oral BID   · metoprolol tartrate  25 mg Oral BID   · aspirin  81 mg Oral Daily   · enoxaparin  1 mg/kg Subcutaneous BID   · atorvastatin  40 mg Oral Nightly          Continuous Infusions:    Infusions Meds    · dextrose            PRN Medications    glucose, dextrose, glucagon (rDNA), dextrose, sodium chloride flush, potassium chloride **OR** potassium alternative oral replacement **OR** potassium chloride, magnesium sulfate, magnesium hydroxide, ondansetron, nitroGLYCERIN, acetaminophen      Vitals:   /70   Pulse 64   Temp 98.7 °F (37.1 °C) (Oral)   Resp 16   Ht 5' 4\" (1.626 m)   Wt 142 lb 1.6 oz (64.5 kg)   SpO2 97%   BMI 24.39 kg/m²    Temp (24hrs), Av °F (36.7 °C), Min:97.5 °F (36.4 °C), Max:98.7 °F (37.1 °C)      Plan:   1. Appreciate cardiology recommendations   2. Plan for cath tomorrow   3. Lovenox for ACS protocol   4. Continue aspirin, lopressor 25 mg BID   5. Continue Lipitor 40 mg qhs   6. Continue ISS; hold amaryl and metformin while in house   7. Check labs tomorrow morning   8. Monitor telemetry   9.  Diabetic/cardiac diet

## 2020-01-29 NOTE — DISCHARGE SUMMARY
Johnson Hung 19    Discharge Summary     Patient ID: Kaci Crews  :  1960   MRN: [id number]     ACCOUNT:  [de-identified]   Patient's PCP: Mat Parada MD  Admit Date: 2020   Discharge Date: 2020  Length of Stay: 4  Code Status:  Prior  Admitting Physician: Sheila Salgado DO  Discharge Physician: Aaron Chacko MD     Active Discharge Diagnoses:     Hospital Problem Lists:  Principal Problem (Resolved):    Unstable angina ([address]  Active Problems:    Sinus bradycardia    Nonobstructive atherosclerosis of coronary artery    Type 2 diabetes mellitus without complication, without long-term current use of insulin ([address]    Mixed hyperlipidemia    Essential hypertension    Anxiety      Admission Condition:  fair     Discharged Condition: stable    Hospital Stay:     Hospital Course:   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  female who was transferred from [facility] where she presented with intermittent mid-sternal and left upper chest pain and shortness of breath over the past 3-4 weeks. She describes the chest pain as a moderate ache and rates it as a 5 out of 10.  She states that it radiates to her neck and left shoulder at times and has also had intermittent associated episodes of diuresis, nausea and dizziness.  [facility] positive Lexiscan stress test consistent with inferior apical ischemia.  Cardiac enzymes and chest x-ray were unremarkable.  Cardiac echo showed normal LV function with an EF 55%.  She was transferred for cardiac cath. Cath done negative for any findings. Dc home. Significant therapeutic interventions:       Findings:  Left main: NL, short, led to deep seating of the guide, with short run of V. Tach/fib required single cardioversion  LAD: Proximal 20% stenosis  LCX: NL  RCA: NL  The LV gram was performed in the CASTANON 30 position. LVEF: 55%.  LV Wall Motion: Normal     Conclusions:  1. Normal / Non obstructive CAD  2.  Overall preserved LV function       Significant Diagnostic Studies:   Labs / Micro:  CBC:   Lab Results   Component Value Date    WBC 7.3 01/28/2020    RBC 4.12 01/28/2020    HGB 12.9 01/28/2020    HCT 39.9 01/28/2020    MCV 96.8 01/28/2020    MCH 31.3 01/28/2020    MCHC 32.3 01/28/2020    RDW 12.9 01/28/2020     01/28/2020     BMP:    Lab Results   Component Value Date    GLUCOSE 159 01/27/2020     01/27/2020    K 4.1 01/27/2020     01/27/2020    CO2 26 01/27/2020    ANIONGAP 12 01/27/2020    BUN 21 01/27/2020    CREATININE 0.60 01/27/2020    BUNCRER NOT REPORTED 01/27/2020    CALCIUM 9.3 01/27/2020    LABGLOM >60 01/27/2020    GFRAA >60 01/27/2020    GFR      01/27/2020    GFR NOT REPORTED 01/27/2020     HFP:    Lab Results   Component Value Date    PROT 6.8 01/25/2020     CMP:    Lab Results   Component Value Date    GLUCOSE 159 01/27/2020     01/27/2020    K 4.1 01/27/2020     01/27/2020    CO2 26 01/27/2020    BUN 21 01/27/2020    CREATININE 0.60 01/27/2020    ANIONGAP 12 01/27/2020    ALKPHOS 71 01/25/2020    ALT 18 01/25/2020    AST 16 01/25/2020    BILITOT 0.26 01/25/2020    LABALBU 4.0 01/25/2020    ALBUMIN 1.4 01/25/2020    LABGLOM >60 01/27/2020    GFRAA >60 01/27/2020    GFR      01/27/2020    GFR NOT REPORTED 01/27/2020    PROT 6.8 01/25/2020    CALCIUM 9.3 01/27/2020     PT/INR:    Lab Results   Component Value Date    PROTIME 9.6 01/27/2020    INR 0.9 01/27/2020     PTT:   Lab Results   Component Value Date    APTT 25.1 01/27/2020     FLP:    Lab Results   Component Value Date    CHOL 259 01/24/2020    TRIG 89 01/24/2020    HDL 89 01/24/2020     U/A:    Lab Results   Component Value Date    COLORU YELLOW 01/27/2020    TURBIDITY CLEAR 01/27/2020    SPECGRAV 1.053 01/27/2020    HGBUR NEGATIVE 01/27/2020    PHUR 7.0 01/27/2020    PROTEINU NEGATIVE 01/27/2020    GLUCOSEU TRACE 01/27/2020    KETUA NEGATIVE 01/27/2020    BILIRUBINUR NEGATIVE 01/27/2020    UROBILINOGEN Normal 01/27/2020    NITRU NEGATIVE 01/27/2020    LEUKOCYTESUR SMALL 01/27/2020     TSH:    Lab Results   Component Value Date    TSH 2.22 01/23/2020        Radiology:  Xr Chest Standard (2 Vw)    Result Date: 1/23/2020  Negative chest.       Consultations:    Consults:     Final Specialist Recommendations/Findings:   IP CONSULT TO CARDIOLOGY  IP CONSULT TO CARDIOLOGY      The patient was seen and examined on day of discharge and this discharge summary is in conjunction with any daily progress note from day of discharge. Discharge plan:     Disposition: Home    Physician Follow Up:     Harsha Alcocer MD  [address] [place] [address] [phone]    In 2 weeks  S/P heart cath/48 hr holter    Wilma Shaw MD  [address]  [phone]    In 1 week         Requiring Further Evaluation/Follow Up POST HOSPITALIZATION/Incidental Findings:     Diet: regular diet    Activity: As tolerated    Instructions to Patient: follow up with cardiology     Discharge Medications:      Medication List      START taking these medications    aspirin 81 MG EC tablet  Take 1 tablet by mouth daily     atorvastatin 40 MG tablet  Commonly known as:  LIPITOR  Take 1 tablet by mouth nightly     metoprolol tartrate 25 MG tablet  Commonly known as:  LOPRESSOR  Take 0.5 tablets by mouth 2 times daily     nitroGLYCERIN 0.4 MG SL tablet  Commonly known as:  NITROSTAT  up to max of 3 total doses. If no relief after 1 dose, call 911.         CHANGE how you take these medications    hydrochlorothiazide 12.5 MG capsule  Commonly known as:  MICROZIDE  Take 1 capsule by mouth daily  What changed:  additional instructions     losartan 50 MG tablet  Commonly known as:  Cozaar  Take 1 tablet by mouth daily  What changed:  additional instructions        CONTINUE taking these medications    blood glucose monitor kit and supplies  1 Units by Does not

## 2020-04-06 RX ORDER — GLIMEPIRIDE 2 MG/1
2 TABLET ORAL
Qty: 30 TABLET | Refills: 5 | Status: SHIPPED
Start: 2020-04-06 | End: 2020-05-12 | Stop reason: DRUGHIGH

## 2020-04-06 NOTE — TELEPHONE ENCOUNTER
Health Maintenance   Topic Date Due    Hepatitis B vaccine (1 of 3 - Risk 3-dose series) 06/09/1981    Shingles Vaccine (1 of 2) 06/09/2012    Colon Cancer Screen FIT/FOBT  08/18/2018    Diabetic retinal exam  01/19/2019    Breast cancer screen  08/30/2019    Diabetic microalbuminuria test  01/22/2020    Cervical cancer screen  01/09/2021 (Originally 6/9/1983)    Diabetic foot exam  04/22/2020    Flu vaccine (Season Ended) 09/01/2020    A1C test (Diabetic or Prediabetic)  01/24/2021    Lipid screen  01/24/2021    Potassium monitoring  01/27/2021    Creatinine monitoring  01/27/2021    DTaP/Tdap/Td vaccine (2 - Td) 11/14/2027    Pneumococcal 0-64 years Vaccine  Completed    Hepatitis C screen  Completed    HIV screen  Completed    Hepatitis A vaccine  Aged Out    Hib vaccine  Aged Out    Meningococcal (ACWY) vaccine  Aged Out             (applicable per patient's age: Cancer Screenings, Depression Screening, Fall Risk Screening, Immunizations)    Hemoglobin A1C (%)   Date Value   01/24/2020 7.9 (H)   04/22/2019 7.4   01/21/2019 7.7     Microalb/Crt.  Ratio (mcg/mg creat)   Date Value   01/22/2019 CANNOT BE CALCULATED     LDL Cholesterol (mg/dL)   Date Value   01/24/2020 152 (H)     AST (U/L)   Date Value   01/25/2020 16     ALT (U/L)   Date Value   01/25/2020 18     BUN (mg/dL)   Date Value   01/27/2020 21 (H)      (goal A1C is < 7)   (goal LDL is <100) need 30-50% reduction from baseline     BP Readings from Last 3 Encounters:   01/28/20 130/76   01/24/20 138/68   01/23/20 130/70    (goal /80)      All Future Testing planned in [facility]:      Next Visit Date:  Future Appointments   Date Time Provider Ignacio Barry   4/24/2020  3:00 PM Evans Rubio MD [facility] [address] [facility]            Patient Active Problem List:     Type 2 diabetes mellitus without complication, without long-term current use of insulin ([address]     Mixed hyperlipidemia     Essential hypertension     Anxiety Noncompliance with medications     Other chest pain     Chest pain     Diabetes mellitus ([address]     Hyperlipidemia     Hypertension     Sinus bradycardia     Nonobstructive atherosclerosis of coronary artery

## 2020-05-08 NOTE — TELEPHONE ENCOUNTER
Medication pending. Told pt she needs an appointment soon. Health Maintenance   Topic Date Due    Hepatitis B vaccine (1 of 3 - Risk 3-dose series) 06/09/1981    Shingles Vaccine (1 of 2) 06/09/2012    Colon Cancer Screen FIT/FOBT  08/18/2018    Diabetic retinal exam  01/19/2019    Breast cancer screen  08/30/2019    Diabetic microalbuminuria test  01/22/2020    Diabetic foot exam  04/22/2020    Cervical cancer screen  01/09/2021 (Originally 6/9/1983)    Flu vaccine (Season Ended) 09/01/2020    A1C test (Diabetic or Prediabetic)  01/24/2021    Lipid screen  01/24/2021    Potassium monitoring  01/27/2021    Creatinine monitoring  01/27/2021    DTaP/Tdap/Td vaccine (2 - Td) 11/14/2027    Pneumococcal 0-64 years Vaccine  Completed    Hepatitis C screen  Completed    HIV screen  Completed    Hepatitis A vaccine  Aged Out    Hib vaccine  Aged Out    Meningococcal (ACWY) vaccine  Aged Out             (applicable per patient's age: Cancer Screenings, Depression Screening, Fall Risk Screening, Immunizations)    Hemoglobin A1C (%)   Date Value   01/24/2020 7.9 (H)   04/22/2019 7.4   01/21/2019 7.7     Microalb/Crt.  Ratio (mcg/mg creat)   Date Value   01/22/2019 CANNOT BE CALCULATED     LDL Cholesterol (mg/dL)   Date Value   01/24/2020 152 (H)     AST (U/L)   Date Value   01/25/2020 16     ALT (U/L)   Date Value   01/25/2020 18     BUN (mg/dL)   Date Value   01/27/2020 21 (H)      (goal A1C is < 7)   (goal LDL is <100) need 30-50% reduction from baseline     BP Readings from Last 3 Encounters:   01/28/20 130/76   01/24/20 138/68   01/23/20 130/70    (goal /80)      All Future Testing planned in [facility]:      Next Visit Date:  Future Appointments   Date Time Provider Ignacio Barry   5/12/2020  2:30 PM Carmen Garcia, Silvano Zay Martinez            Patient Active Problem List:     Type 2 diabetes mellitus without complication, without long-term current use of insulin ([address]     Mixed

## 2020-05-12 ENCOUNTER — OFFICE VISIT (OUTPATIENT)
Dept: FAMILY MEDICINE CLINIC | Age: 60
End: 2020-05-12
Payer: COMMERCIAL

## 2020-05-12 VITALS
HEIGHT: 64 IN | HEART RATE: 72 BPM | DIASTOLIC BLOOD PRESSURE: 84 MMHG | OXYGEN SATURATION: 98 % | BODY MASS INDEX: 26.46 KG/M2 | SYSTOLIC BLOOD PRESSURE: 128 MMHG | WEIGHT: 155 LBS | RESPIRATION RATE: 16 BRPM

## 2020-05-12 LAB
CREATININE URINE POCT: 300
HBA1C MFR BLD: 8.3 %
MICROALBUMIN/CREAT 24H UR: 30 MG/G{CREAT}
MICROALBUMIN/CREAT UR-RTO: <30

## 2020-05-12 PROCEDURE — 3052F HG A1C>EQUAL 8.0%<EQUAL 9.0%: CPT | Performed by: INTERNAL MEDICINE

## 2020-05-12 PROCEDURE — 99214 OFFICE O/P EST MOD 30 MIN: CPT | Performed by: INTERNAL MEDICINE

## 2020-05-12 PROCEDURE — 83036 HEMOGLOBIN GLYCOSYLATED A1C: CPT | Performed by: INTERNAL MEDICINE

## 2020-05-12 PROCEDURE — 82044 UR ALBUMIN SEMIQUANTITATIVE: CPT | Performed by: INTERNAL MEDICINE

## 2020-05-12 RX ORDER — GLIMEPIRIDE 4 MG/1
4 TABLET ORAL EVERY MORNING
Qty: 30 TABLET | Refills: 3 | Status: SHIPPED | OUTPATIENT
Start: 2020-05-12 | End: 2020-08-13 | Stop reason: SDUPTHER

## 2020-05-12 SDOH — ECONOMIC STABILITY: INCOME INSECURITY: HOW HARD IS IT FOR YOU TO PAY FOR THE VERY BASICS LIKE FOOD, HOUSING, MEDICAL CARE, AND HEATING?: NOT HARD AT ALL

## 2020-05-12 SDOH — ECONOMIC STABILITY: TRANSPORTATION INSECURITY
IN THE PAST 12 MONTHS, HAS LACK OF TRANSPORTATION KEPT YOU FROM MEETINGS, WORK, OR FROM GETTING THINGS NEEDED FOR DAILY LIVING?: NO

## 2020-05-12 SDOH — ECONOMIC STABILITY: FOOD INSECURITY: WITHIN THE PAST 12 MONTHS, YOU WORRIED THAT YOUR FOOD WOULD RUN OUT BEFORE YOU GOT MONEY TO BUY MORE.: NEVER TRUE

## 2020-05-12 SDOH — ECONOMIC STABILITY: FOOD INSECURITY: WITHIN THE PAST 12 MONTHS, THE FOOD YOU BOUGHT JUST DIDN'T LAST AND YOU DIDN'T HAVE MONEY TO GET MORE.: NEVER TRUE

## 2020-05-12 SDOH — ECONOMIC STABILITY: TRANSPORTATION INSECURITY
IN THE PAST 12 MONTHS, HAS THE LACK OF TRANSPORTATION KEPT YOU FROM MEDICAL APPOINTMENTS OR FROM GETTING MEDICATIONS?: NO

## 2020-05-12 ASSESSMENT — ENCOUNTER SYMPTOMS
COUGH: 1
SORE THROAT: 0
SHORTNESS OF BREATH: 0
ABDOMINAL PAIN: 0
NAUSEA: 0
BLURRED VISION: 0

## 2020-05-12 NOTE — PATIENT INSTRUCTIONS
SURVEY:    You may be receiving a survey from [employer] regarding your visit today. Please complete the survey to enable us to provide the highest quality of care to you and your family. If you cannot score us a very good on any question, please call the office to discuss how we could have made your experience a very good one. Thank you.

## 2020-05-12 NOTE — PROGRESS NOTES
pain, palpitations and leg swelling. Gastrointestinal: Negative for abdominal pain and nausea. Genitourinary: Negative for dysuria. Skin: Negative for rash. Neurological: Negative for dizziness, weakness and headaches. Psychiatric/Behavioral: Negative for self-injury and sleep disturbance. The patient is not nervous/anxious. Physical Exam:     Vitals:  /84   Pulse 72   Resp 16   Ht 5' 4\" (1.626 m)   Wt 155 lb (70.3 kg)   SpO2 98%   BMI 26.61 kg/m²       Physical Exam  Vitals signs reviewed. Constitutional:       General: She is not in acute distress. Appearance: She is well-developed. HENT:      Head: Normocephalic and atraumatic. Right Ear: External ear normal.      Left Ear: External ear normal.      Nose: Nose normal. No congestion. Mouth/Throat:      Mouth: Mucous membranes are moist.      Pharynx: Oropharynx is clear. Eyes:      General: No scleral icterus. Right eye: No discharge. Left eye: No discharge. Conjunctiva/sclera: Conjunctivae normal.   Neck:      Thyroid: No thyromegaly. Cardiovascular:      Rate and Rhythm: Normal rate and regular rhythm. Heart sounds: Normal heart sounds. No murmur. Pulmonary:      Effort: Pulmonary effort is normal.      Breath sounds: Normal breath sounds. No wheezing or rales. Abdominal:      General: Bowel sounds are normal. There is no distension. Palpations: Abdomen is soft. There is no mass. Tenderness: There is no abdominal tenderness. Musculoskeletal: Normal range of motion. Right lower leg: No edema. Left lower leg: No edema. Lymphadenopathy:      Cervical: No cervical adenopathy. Skin:     General: Skin is warm and dry. Coloration: Skin is not pale. Findings: No rash. Neurological:      General: No focal deficit present. Mental Status: She is alert and oriented to person, place, and time.    Psychiatric:         Mood and Affect: Mood normal. ANN DIGITAL SCREEN W CAD BILATERAL   6. Colon cancer screening   Refer to Dr. Tobias Fragoso MD, General Surgery, [facility]                   Completed Refills   Requested Prescriptions     Signed Prescriptions Disp Refills    metFORMIN (GLUCOPHAGE) 1000 MG tablet 60 tablet 0     Sig: TAKE 1 TABLET BY MOUTH TWICE DAILY WITH MEALS    glimepiride (AMARYL) 4 MG tablet 30 tablet 3     Sig: Take 1 tablet by mouth every morning    SITagliptin (JANUVIA) 50 MG tablet 90 tablet 1     Sig: Take 1 tablet by mouth daily     Return in about 3 months (around 8/12/2020) for diabetes, hyperlipidemia, HTN. Orders Placed This Encounter   Medications    metFORMIN (GLUCOPHAGE) 1000 MG tablet     Sig: TAKE 1 TABLET BY MOUTH TWICE DAILY WITH MEALS     Dispense:  60 tablet     Refill:  0    glimepiride (AMARYL) 4 MG tablet     Sig: Take 1 tablet by mouth every morning     Dispense:  30 tablet     Refill:  3    SITagliptin (JANUVIA) 50 MG tablet     Sig: Take 1 tablet by mouth daily     Dispense:  90 tablet     Refill:  1     Orders Placed This Encounter   Procedures    ANN DIGITAL SCREEN W CAD BILATERAL     Standing Status:   Future     Standing Expiration Date:   7/12/2021     Order Specific Question:   Reason for exam:     Answer:   Breast cancer screening    Katherine Carter MD, General Surgery, [facility]     Referral Priority:   Routine     Referral Type:   Eval and Treat     Referral Reason:   Specialty Services Required     Referred to Provider:   Fabricio Avelar MD     Requested Specialty:   General Surgery     Number of Visits Requested:   1    POCT microalbumin    POCT glycosylated hemoglobin (Hb A1C)    HM DIABETES FOOT EXAM         Patient Instructions     SURVEY:    You may be receiving a survey from [employer] regarding your visit today. Please complete the survey to enable us to provide the highest quality of care to you and your family.     If you cannot score us a very good on any question, please call the office to discuss how we could have made your experience a very good one. Thank you.       Electronically signed by Edwin Aragon MD on 5/12/2020 at 2:48 PM           Completed Refills      Requested Prescriptions     Signed Prescriptions Disp Refills    metFORMIN (GLUCOPHAGE) 1000 MG tablet 60 tablet 0     Sig: TAKE 1 TABLET BY MOUTH TWICE DAILY WITH MEALS    glimepiride (AMARYL) 4 MG tablet 30 tablet 3     Sig: Take 1 tablet by mouth every morning    SITagliptin (JANUVIA) 50 MG tablet 90 tablet 1     Sig: Take 1 tablet by mouth daily

## 2020-06-03 ENCOUNTER — HOSPITAL ENCOUNTER (OUTPATIENT)
Dept: MAMMOGRAPHY | Age: 60
Discharge: HOME | End: 2020-06-05
Payer: COMMERCIAL

## 2020-06-03 PROCEDURE — 77067 SCR MAMMO BI INCL CAD: CPT

## 2020-06-08 ENCOUNTER — OFFICE VISIT (OUTPATIENT)
Dept: SURGERY | Age: 60
End: 2020-06-08

## 2020-06-08 VITALS
HEART RATE: 100 BPM | BODY MASS INDEX: 25.95 KG/M2 | DIASTOLIC BLOOD PRESSURE: 80 MMHG | WEIGHT: 152 LBS | HEIGHT: 64 IN | SYSTOLIC BLOOD PRESSURE: 132 MMHG | RESPIRATION RATE: 18 BRPM | TEMPERATURE: 98.1 F

## 2020-06-08 PROCEDURE — 99999 PR OFFICE/OUTPT VISIT,PROCEDURE ONLY: CPT | Performed by: SURGERY

## 2020-06-08 RX ORDER — SODIUM, POTASSIUM,MAG SULFATES 17.5-3.13G
1 SOLUTION, RECONSTITUTED, ORAL ORAL ONCE
Qty: 2 BOTTLE | Refills: 0 | Status: SHIPPED | OUTPATIENT
Start: 2020-06-08 | End: 2020-06-08

## 2020-06-11 ENCOUNTER — OFFICE VISIT (OUTPATIENT)
Dept: FAMILY MEDICINE CLINIC | Age: 60
End: 2020-06-11
Payer: COMMERCIAL

## 2020-06-11 VITALS
BODY MASS INDEX: 26.93 KG/M2 | WEIGHT: 152 LBS | OXYGEN SATURATION: 98 % | RESPIRATION RATE: 18 BRPM | HEIGHT: 63 IN | DIASTOLIC BLOOD PRESSURE: 89 MMHG | HEART RATE: 52 BPM | SYSTOLIC BLOOD PRESSURE: 155 MMHG

## 2020-06-11 PROBLEM — R00.1 SINUS BRADYCARDIA: Status: RESOLVED | Noted: 2020-01-27 | Resolved: 2020-06-11

## 2020-06-11 PROBLEM — R07.9 CHEST PAIN: Status: RESOLVED | Noted: 2020-01-23 | Resolved: 2020-06-11

## 2020-06-11 PROBLEM — R07.89 OTHER CHEST PAIN: Status: RESOLVED | Noted: 2020-01-23 | Resolved: 2020-06-11

## 2020-06-11 PROBLEM — Z91.14 NONCOMPLIANCE WITH MEDICATIONS: Status: RESOLVED | Noted: 2018-07-02 | Resolved: 2020-06-11

## 2020-06-11 PROBLEM — Z91.148 NONCOMPLIANCE WITH MEDICATIONS: Status: RESOLVED | Noted: 2018-07-02 | Resolved: 2020-06-11

## 2020-06-11 PROCEDURE — 99214 OFFICE O/P EST MOD 30 MIN: CPT | Performed by: INTERNAL MEDICINE

## 2020-06-11 PROCEDURE — 3052F HG A1C>EQUAL 8.0%<EQUAL 9.0%: CPT | Performed by: INTERNAL MEDICINE

## 2020-06-11 RX ORDER — CLOTRIMAZOLE AND BETAMETHASONE DIPROPIONATE 10; .64 MG/G; MG/G
CREAM TOPICAL
Qty: 15 G | Refills: 1 | Status: SHIPPED | OUTPATIENT
Start: 2020-06-11 | End: 2021-10-18

## 2020-06-11 RX ORDER — PREDNISONE 20 MG/1
20 TABLET ORAL 2 TIMES DAILY
Qty: 10 TABLET | Refills: 0 | Status: SHIPPED | OUTPATIENT
Start: 2020-06-11 | End: 2020-06-16

## 2020-06-11 RX ORDER — GABAPENTIN 100 MG/1
100 CAPSULE ORAL 2 TIMES DAILY
Qty: 180 CAPSULE | Refills: 1 | Status: SHIPPED | OUTPATIENT
Start: 2020-06-11 | End: 2020-11-13

## 2020-06-11 ASSESSMENT — ENCOUNTER SYMPTOMS
NAUSEA: 0
COUGH: 0
SHORTNESS OF BREATH: 0
ABDOMINAL PAIN: 0
EYE PAIN: 0
SORE THROAT: 0
WHEEZING: 0
CHEST TIGHTNESS: 0

## 2020-06-11 NOTE — PATIENT INSTRUCTIONS
SURVEY:    You may be receiving a survey from [employer] regarding your visit today. Please complete the survey to enable us to provide the highest quality of care to you and your family. If you cannot score us a very good on any question, please call the office to discuss how we could have made your experience a very good one. Thank you.

## 2020-06-11 NOTE — PROGRESS NOTES
HPI Notes    Name: Rhys Lua  : 1960         Chief Complaint:     Chief Complaint   Patient presents with    Swelling    Other     broke out in red blotchy rash       History of Present Illness:        Yoni Carey presents to office for evaluation of rash on legs, abdominal wall . Also complains of neuropathy in both legs    Last week she was gardening , pulling weeds, planting flowers. Was in shorts. States probably was exposed to poison ivy      States has a history of neuropathy many years ago related to her diabetes. Was on gabapentin. Then she stopped taking it because was feeling better. Now symptoms are worsening, has constant, burning pain in feet and hands. Asking to get back on gabapentin that she was taking for diabetic neuropathy. She took it many years ago. Does not recall the dose but believes she was taking it once or twice a day    Also has rash under both breasts that is different from the rash on legs. Had it for more than a month. It is burning. Rash   This is a new problem. Episode onset: 4 days ago. The problem is unchanged. The affected locations include the left lower leg, left upper leg, right upper leg, right lower leg and abdomen. The rash is characterized by itchiness, redness and swelling. She was exposed to plant contact. Pertinent negatives include no congestion, cough, eye pain, fatigue, fever, joint pain, shortness of breath or sore throat. Past treatments include nothing. There is no history of eczema.            Past Medical History:     Past Medical History:   Diagnosis Date    Diabetes mellitus [address]     Hyperlipidemia     Hypertension       Reviewed all health maintenance requirements and orderedappropriate tests  Health Maintenance Due   Topic Date Due    Colon Cancer Screen FIT/FOBT  2018    Diabetic retinal exam  2019    Breast cancer screen  2019       Past Surgical History:     Past Surgical History:   Procedure Laterality Date    DX-E11.9  Pt tests 4 x day 10/26/17  Yes Rishabh Proctor MD   Lancets MISC Please dispense lancets to go with meter and test strips  DX E11.9 pt tests QID 10/26/17  Yes Rishabh Proctor MD   calcium carbonate (OSCAL) 500 MG TABS tablet Take 500 mg by mouth daily   Yes Historical Provider, MD   Multiple Vitamins-Minerals (THERAPEUTIC MULTIVITAMIN-MINERALS) tablet Take 1 tablet by mouth daily   Yes Historical Provider, MD   Insulin Pen Needle (MEIJER PEN NEEDLES) 31G X 6 MM MISC 1 each by Does not apply route daily 8/14/17  Yes Rishabh Proctor MD   Blood Glucose Monitoring Suppl MILANA 1 Units by Does not apply route once for 1 dose DX-E11.9 10/26/17 10/26/17  Rishabh Proctor MD        Allergies:       Patient has no known allergies. Social History:     Tobacco: reports that she quit smoking about 21 years ago. She has a 10.00 pack-year smoking history. She has never used smokeless tobacco.  Alcohol:      reports no history of alcohol use. Drug Use:  reports no history of drug use. Family History:     Family History   Problem Relation Age of Onset    No Known Problems Mother     Diabetes Father        Review of Systems:         Review of Systems   Constitutional: Negative for activity change, appetite change, chills, fatigue and fever. HENT: Negative for congestion and sore throat. Eyes: Negative for pain. Respiratory: Negative for cough, chest tightness, shortness of breath and wheezing. Cardiovascular: Negative for chest pain and palpitations. Gastrointestinal: Negative for abdominal pain and nausea. Genitourinary: Negative for dysuria. Musculoskeletal: Negative for arthralgias and joint pain. Skin: Positive for rash. Neurological: Positive for numbness (feet). Negative for dizziness, weakness and headaches. Psychiatric/Behavioral: Negative for dysphoric mood and sleep disturbance. The patient is not nervous/anxious.           Physical Exam:     Vitals:  BP (!) 155/89   Pulse 52

## 2020-07-10 ENCOUNTER — HOSPITAL ENCOUNTER (OUTPATIENT)
Dept: PREADMISSION TESTING | Age: 60
Setting detail: SPECIMEN
Discharge: HOME | End: 2020-07-10
Payer: COMMERCIAL

## 2020-07-10 PROCEDURE — U0003 INFECTIOUS AGENT DETECTION BY NUCLEIC ACID (DNA OR RNA); SEVERE ACUTE RESPIRATORY SYNDROME CORONAVIRUS 2 (SARS-COV-2) (CORONAVIRUS DISEASE [COVID-19]), AMPLIFIED PROBE TECHNIQUE, MAKING USE OF HIGH THROUGHPUT TECHNOLOGIES AS DESCRIBED BY CMS-2020-01-R: HCPCS

## 2020-07-11 ENCOUNTER — TELEPHONE (OUTPATIENT)
Dept: PRIMARY CARE CLINIC | Age: 60
End: 2020-07-11

## 2020-07-11 ASSESSMENT — ENCOUNTER SYMPTOMS
BACK PAIN: 0
CHOKING: 0
VOMITING: 0
SORE THROAT: 0
ABDOMINAL PAIN: 0
TROUBLE SWALLOWING: 0
SHORTNESS OF BREATH: 0
NAUSEA: 0
COUGH: 0
BLOOD IN STOOL: 0

## 2020-07-11 NOTE — PATIENT INSTRUCTIONS
fluid after the test to replace the fluids you may have lost during the colon prep. But don't drink alcohol. Your doctor will talk to you about when you'll need your next colonoscopy. The results of your test and your risk for colorectal cancer will help your doctor decide how often you need to be checked. After the test, you may be bloated or have gas pains. You may need to pass gas. If a biopsy was done or a polyp was removed, you may have streaks of blood in your stool (feces) for a few days. If polyps were taken out, your doctor may tell you to avoid taking aspirin and nonsteroidal anti-inflammatory drugs (NSAIDs) for 7 to 14 days. Problems such as heavy rectal bleeding may not occur until several weeks after the test. This isn't common. But it can happen after polyps are removed. Follow-up care is a key part of your treatment and safety. Be sure to make and go to all appointments, and call your doctor if you are having problems. It's also a good idea to know your test results and keep a list of the medicines you take. Where can you learn more? Go to https://[employer] Like[employer]. org and sign in to your [employer] account. Enter C789 in the [employer] box to learn more about \"Learning About Colonoscopy. \"     If you do not have an account, please click on the \"Sign Up Now\" link. Current as of: August 22, 2019               Content Version: 12.5  © 2052-2117 Healthwise, Incorporated. Care instructions adapted under license by [facility] ([facility]). If you have questions about a medical condition or this instruction, always ask your healthcare professional. [place] [address] any warranty or liability for your use of this information.

## 2020-07-11 NOTE — PROGRESS NOTES
Guerda Guajardo MD  General Surgery, Endoscopy  Chief Medical Officer    [address] [facility]  [address] [address]  Dept: [phone]  Fax: 08 Aretha Germain  Chief Complaint   Patient presents with    New Patient    Colonoscopy     Patient returns for screening colonoscopy consult. Patient denies any symptoms. No previous colonoscopy. HPI    Ms Virgen Seth is a 65 yo WF kindly referred to me by Dr Jarrod Martinez for a screening colonoscopy. She has no complaints. No abdominal pain. No recent weight changes. Normal appetite. Daily BM's, formed and brown, without blood. No prior GI surgery nor endoscopy. History hysterectomy, uterine cancer. HTN, DM. No cough, fever nor other respiratory problems. No family history of colon cancer nor polyps. She quit tobacco years ago. Review of Systems   Constitutional: Negative for activity change, appetite change, chills, fever and unexpected weight change. HENT: Negative for nosebleeds, sneezing, sore throat and trouble swallowing. Eyes: Negative for visual disturbance. Respiratory: Negative for cough, choking and shortness of breath. Cardiovascular: Negative for chest pain, palpitations and leg swelling. Gastrointestinal: Negative for abdominal pain, blood in stool, nausea and vomiting. Genitourinary: Negative for dysuria, flank pain and hematuria. Musculoskeletal: Positive for arthralgias. Negative for back pain, gait problem and myalgias. Allergic/Immunologic: Negative for immunocompromised state. Neurological: Negative for dizziness, seizures, syncope, weakness and headaches. Hematological: Does not bruise/bleed easily. Psychiatric/Behavioral: Negative for confusion and sleep disturbance.        Past Medical History:   Diagnosis Date    Diabetes mellitus ([address]     Hyperlipidemia     Hypertension        Past Surgical History:   Procedure Laterality Date    BREAST BIOPSY Left     HYSTERECTOMY  1986    uterine cancer       Family History   Problem Relation Age of Onset    No Known Problems Mother     Diabetes Father        Allergies:  See list    Current Outpatient Medications   Medication Sig Dispense Refill    glimepiride (AMARYL) 4 MG tablet Take 1 tablet by mouth every morning 30 tablet 3    SITagliptin (JANUVIA) 50 MG tablet Take 1 tablet by mouth daily 90 tablet 1    aspirin 81 MG EC tablet Take 1 tablet by mouth daily 30 tablet 0    atorvastatin (LIPITOR) 40 MG tablet Take 1 tablet by mouth nightly 30 tablet 0    metoprolol tartrate (LOPRESSOR) 25 MG tablet Take 0.5 tablets by mouth 2 times daily 60 tablet 0    hydrochlorothiazide (MICROZIDE) 12.5 MG capsule Take 1 capsule by mouth daily (Patient taking differently: Take 12.5 mg by mouth daily Does not take every day) 30 capsule 5    clobetasol prop emollient base (CLOBETASOL PROPIONATE E) 0.05 % CREA Apply topically daily 45 g 0    losartan (COZAAR) 50 MG tablet Take 1 tablet by mouth daily (Patient taking differently: Take 50 mg by mouth daily Does not take every day) 90 tablet 2    hydrocortisone 2.5 % cream Apply topically 2 times daily. 3.5 g 0    Glucose Blood (BLOOD GLUCOSE TEST STRIPS) STRP Please dispense strips  That are covered by her insurance and her meter  DX-E11.9  Pt tests 4 x day 100 strip 5    Lancets MISC Please dispense lancets to go with meter and test strips  DX E11.9 pt tests  each 5    calcium carbonate (OSCAL) 500 MG TABS tablet Take 500 mg by mouth daily      Multiple Vitamins-Minerals (THERAPEUTIC MULTIVITAMIN-MINERALS) tablet Take 1 tablet by mouth daily      Insulin Pen Needle (MEIJER PEN NEEDLES) 31G X 6 MM MISC 1 each by Does not apply route daily 100 each 3    metFORMIN (GLUCOPHAGE) 1000 MG tablet TAKE 1 TABLET BY MOUTH TWICE DAILY WITH MEALS 60 tablet 3    gabapentin (NEURONTIN) 100 MG capsule Take 1 capsule by mouth 2 times daily for 180 days.  Intended supply: 90 days 180 capsule 1    clotrimazole-betamethasone (LOTRISONE) 1-0.05 % cream Apply topically 2 times daily. 15 g 1    Blood Glucose Monitoring Suppl MILANA 1 Units by Does not apply route once for 1 dose DX-E11.9 1 Device 0     No current facility-administered medications for this visit. Social History     Socioeconomic History    Marital status:      Spouse name: None    Number of children: None    Years of education: None    Highest education level: None   Occupational History    None   Social Needs    Financial resource strain: Not hard at all   [employer] insecurity     Worry: Never true     Inability: Never true   [email or web address] needs     Medical: No     Non-medical: No   Tobacco Use    Smoking status: Former Smoker     Packs/day: 0.50     Years: 20.00     Pack years: 10.00     Last attempt to quit: 10/3/1998     Years since quittin.7    Smokeless tobacco: Never Used   Substance and Sexual Activity    Alcohol use: No    Drug use: No     Types: Marijuana     Comment: as teenager    Sexual activity: None   Lifestyle    Physical activity     Days per week: None     Minutes per session: None    Stress: None   Relationships    Social connections     Talks on phone: None     Gets together: None     Attends Christianity service: None     Active member of club or organization: None     Attends meetings of clubs or organizations: None     Relationship status: None    Intimate partner violence     Fear of current or ex partner: None     Emotionally abused: None     Physically abused: None     Forced sexual activity: None   Other Topics Concern    None   Social History Narrative    None       /80 (Site: Left Upper Arm, Position: Sitting, Cuff Size: Medium Adult)   Pulse 100   Temp 98.1 °F (36.7 °C) (Infrared)   Resp 18   Ht 5' 4\" (1.626 m)   Wt 152 lb (68.9 kg)   BMI 26.09 kg/m²      Physical Exam  Vitals signs and nursing note reviewed.    Constitutional:       Appearance: She is well-developed. HENT:      Head: Normocephalic and atraumatic. Eyes:      General: No scleral icterus. Conjunctiva/sclera: Conjunctivae normal.      Pupils: Pupils are equal, round, and reactive to light. Neck:      Musculoskeletal: Normal range of motion and neck supple. Vascular: No JVD. Trachea: No tracheal deviation. Cardiovascular:      Rate and Rhythm: Normal rate and regular rhythm. Pulmonary:      Effort: Pulmonary effort is normal. No respiratory distress. Breath sounds: Normal breath sounds. Chest:      Chest wall: No tenderness. Abdominal:      General: Bowel sounds are normal. There is no distension. Palpations: Abdomen is soft. There is no mass. Tenderness: There is no abdominal tenderness. There is no guarding or rebound. Musculoskeletal: Normal range of motion. Lymphadenopathy:      Cervical: No cervical adenopathy. Skin:     General: Skin is warm and dry. Findings: No erythema or rash. Neurological:      Mental Status: She is alert and oriented to person, place, and time. Cranial Nerves: No cranial nerve deficit. Psychiatric:         Behavior: Behavior normal.         Thought Content:  Thought content normal.         Judgment: Judgment normal.         IMAGING/LABS    XR CHEST STANDARD (2 VW)   Status: Final result    Order Providers     Authorizing  Billing    MD Omar Hoff MD            Signed by     Signed  Date/Time   Phone  Pager    [facility]  1/23/2020  12:41  [phone]     Reading Providers     Read  Date  Phone  Pager    Gordon Wilkins, [address] [place] [address]  Jan 23, 2020  [phone]     All Reviewers List     Cheo Perdomo RN on 1/28/2020 17:28    Radiation Dose Estimates     No radiation information found for this patient    Narrative    EXAM: XR CHEST (2 VW)         REASON FOR EXAM: Chest pain, shortness of breath.         COMPARISON: None.              TECHNIQUE: 2 views chest.           FINDINGS: Heart size normal. Lungs clear. Bony thorax and upper abdomen normal.                   Impression         Negative chest.          ASSESSMENT     Diagnosis Orders   1. Encounter for screening colonoscopy     2. Pre-op testing  EKG 12 Lead       PLAN    Will proceed with screening colonoscopy.   Risks, benefits, alternatives thoroughly reviewed and accepted by Ms Lalito Kim, including remote risk of GI bleeding, perforation, missed lesions, COVID-19 exposure/infection, etc.     Nina Jewell MD

## 2020-07-12 LAB
SARS-COV-2, PCR: NORMAL
SARS-COV-2, RAPID: NORMAL
SARS-COV-2: NOT DETECTED
SOURCE: NORMAL

## 2020-07-13 ENCOUNTER — HOSPITAL ENCOUNTER (OUTPATIENT)
Age: 60
Setting detail: OUTPATIENT SURGERY
Discharge: HOME | End: 2020-07-13
Attending: SURGERY | Admitting: SURGERY
Payer: COMMERCIAL

## 2020-07-13 ENCOUNTER — ANESTHESIA (OUTPATIENT)
Dept: ENDOSCOPY | Age: 60
End: 2020-07-13
Payer: COMMERCIAL

## 2020-07-13 ENCOUNTER — ANESTHESIA EVENT (OUTPATIENT)
Dept: ENDOSCOPY | Age: 60
End: 2020-07-13
Payer: COMMERCIAL

## 2020-07-13 VITALS
TEMPERATURE: 97.6 F | HEIGHT: 64 IN | BODY MASS INDEX: 25.61 KG/M2 | WEIGHT: 150 LBS | HEART RATE: 54 BPM | RESPIRATION RATE: 18 BRPM | SYSTOLIC BLOOD PRESSURE: 146 MMHG | OXYGEN SATURATION: 98 % | DIASTOLIC BLOOD PRESSURE: 64 MMHG

## 2020-07-13 VITALS
TEMPERATURE: 98.6 F | DIASTOLIC BLOOD PRESSURE: 72 MMHG | SYSTOLIC BLOOD PRESSURE: 111 MMHG | RESPIRATION RATE: 17 BRPM | OXYGEN SATURATION: 100 %

## 2020-07-13 PROBLEM — Z12.11 ENCOUNTER FOR SCREENING COLONOSCOPY: Status: ACTIVE | Noted: 2020-07-13

## 2020-07-13 LAB — GLUCOSE BLD-MCNC: 115 MG/DL (ref 65–99)

## 2020-07-13 PROCEDURE — 6360000002 HC RX W HCPCS: Performed by: NURSE ANESTHETIST, CERTIFIED REGISTERED

## 2020-07-13 PROCEDURE — 3609027000 HC COLONOSCOPY: Performed by: SURGERY

## 2020-07-13 PROCEDURE — 3700000000 HC ANESTHESIA ATTENDED CARE: Performed by: SURGERY

## 2020-07-13 PROCEDURE — 2709999900 HC NON-CHARGEABLE SUPPLY: Performed by: SURGERY

## 2020-07-13 PROCEDURE — 82947 ASSAY GLUCOSE BLOOD QUANT: CPT

## 2020-07-13 PROCEDURE — 2580000003 HC RX 258: Performed by: SURGERY

## 2020-07-13 PROCEDURE — 7100000010 HC PHASE II RECOVERY - FIRST 15 MIN: Performed by: SURGERY

## 2020-07-13 PROCEDURE — 2500000003 HC RX 250 WO HCPCS: Performed by: NURSE ANESTHETIST, CERTIFIED REGISTERED

## 2020-07-13 PROCEDURE — 45378 DIAGNOSTIC COLONOSCOPY: CPT | Performed by: SURGERY

## 2020-07-13 PROCEDURE — 7100000011 HC PHASE II RECOVERY - ADDTL 15 MIN: Performed by: SURGERY

## 2020-07-13 PROCEDURE — 3700000001 HC ADD 15 MINUTES (ANESTHESIA): Performed by: SURGERY

## 2020-07-13 RX ORDER — LIDOCAINE HYDROCHLORIDE 10 MG/ML
INJECTION, SOLUTION EPIDURAL; INFILTRATION; INTRACAUDAL; PERINEURAL PRN
Status: DISCONTINUED | OUTPATIENT
Start: 2020-07-13 | End: 2020-07-13 | Stop reason: SDUPTHER

## 2020-07-13 RX ORDER — SODIUM CHLORIDE 0.9 % (FLUSH) 0.9 %
10 SYRINGE (ML) INJECTION PRN
Status: DISCONTINUED | OUTPATIENT
Start: 2020-07-13 | End: 2020-07-13 | Stop reason: HOSPADM

## 2020-07-13 RX ORDER — MIDAZOLAM HYDROCHLORIDE 2 MG/2ML
INJECTION, SOLUTION INTRAMUSCULAR; INTRAVENOUS PRN
Status: DISCONTINUED | OUTPATIENT
Start: 2020-07-13 | End: 2020-07-13 | Stop reason: SDUPTHER

## 2020-07-13 RX ORDER — SODIUM CHLORIDE 0.9 % (FLUSH) 0.9 %
10 SYRINGE (ML) INJECTION EVERY 12 HOURS SCHEDULED
Status: DISCONTINUED | OUTPATIENT
Start: 2020-07-13 | End: 2020-07-13 | Stop reason: HOSPADM

## 2020-07-13 RX ORDER — FENTANYL CITRATE 50 UG/ML
INJECTION, SOLUTION INTRAMUSCULAR; INTRAVENOUS PRN
Status: DISCONTINUED | OUTPATIENT
Start: 2020-07-13 | End: 2020-07-13 | Stop reason: SDUPTHER

## 2020-07-13 RX ORDER — PROPOFOL 10 MG/ML
INJECTION, EMULSION INTRAVENOUS CONTINUOUS PRN
Status: DISCONTINUED | OUTPATIENT
Start: 2020-07-13 | End: 2020-07-13 | Stop reason: SDUPTHER

## 2020-07-13 RX ORDER — SODIUM CHLORIDE, SODIUM LACTATE, POTASSIUM CHLORIDE, CALCIUM CHLORIDE 600; 310; 30; 20 MG/100ML; MG/100ML; MG/100ML; MG/100ML
INJECTION, SOLUTION INTRAVENOUS CONTINUOUS
Status: DISCONTINUED | OUTPATIENT
Start: 2020-07-13 | End: 2020-07-13 | Stop reason: HOSPADM

## 2020-07-13 RX ADMIN — FENTANYL CITRATE 50 MCG: 50 INJECTION, SOLUTION INTRAMUSCULAR; INTRAVENOUS at 08:18

## 2020-07-13 RX ADMIN — FENTANYL CITRATE 50 MCG: 50 INJECTION, SOLUTION INTRAMUSCULAR; INTRAVENOUS at 08:20

## 2020-07-13 RX ADMIN — MIDAZOLAM HYDROCHLORIDE 1 MG: 2 INJECTION, SOLUTION INTRAMUSCULAR; INTRAVENOUS at 08:20

## 2020-07-13 RX ADMIN — PROPOFOL 100 MCG/KG/MIN: 10 INJECTION, EMULSION INTRAVENOUS at 08:20

## 2020-07-13 RX ADMIN — LIDOCAINE HYDROCHLORIDE 5 ML: 10 INJECTION, SOLUTION EPIDURAL; INFILTRATION; INTRACAUDAL; PERINEURAL at 08:20

## 2020-07-13 RX ADMIN — MIDAZOLAM HYDROCHLORIDE 1 MG: 2 INJECTION, SOLUTION INTRAMUSCULAR; INTRAVENOUS at 08:18

## 2020-07-13 RX ADMIN — SODIUM CHLORIDE, POTASSIUM CHLORIDE, SODIUM LACTATE AND CALCIUM CHLORIDE: 600; 310; 30; 20 INJECTION, SOLUTION INTRAVENOUS at 07:50

## 2020-07-13 ASSESSMENT — PAIN SCALES - GENERAL
PAINLEVEL_OUTOF10: 0
PAINLEVEL_OUTOF10: 0

## 2020-07-13 ASSESSMENT — PAIN - FUNCTIONAL ASSESSMENT: PAIN_FUNCTIONAL_ASSESSMENT: 0-10

## 2020-07-13 NOTE — H&P
HPI     Ms Yuridia Dickinson is a 63 yo WF kindly referred to me by Dr Eileen Cuello for a screening colonoscopy. She has no complaints. No abdominal pain. No recent weight changes. Normal appetite. Daily BM's, formed and brown, without blood. No prior GI surgery nor endoscopy. History hysterectomy, uterine cancer. HTN, DM. No cough, fever nor other respiratory problems. No family history of colon cancer nor polyps. She quit tobacco years ago.     Review of Systems   Constitutional: Negative for activity change, appetite change, chills, fever and unexpected weight change. HENT: Negative for nosebleeds, sneezing, sore throat and trouble swallowing. Eyes: Negative for visual disturbance. Respiratory: Negative for cough, choking and shortness of breath. Cardiovascular: Negative for chest pain, palpitations and leg swelling. Gastrointestinal: Negative for abdominal pain, blood in stool, nausea and vomiting. Genitourinary: Negative for dysuria, flank pain and hematuria. Musculoskeletal: Positive for arthralgias. Negative for back pain, gait problem and myalgias. Allergic/Immunologic: Negative for immunocompromised state. Neurological: Negative for dizziness, seizures, syncope, weakness and headaches. Hematological: Does not bruise/bleed easily.    Psychiatric/Behavioral: Negative for confusion and sleep disturbance.         Past Medical History        Past Medical History:   Diagnosis Date    Diabetes mellitus ([address]      Hyperlipidemia      Hypertension             Past Surgical History         Past Surgical History:   Procedure Laterality Date    BREAST BIOPSY Left      HYSTERECTOMY   1986     uterine cancer           Family History         Family History   Problem Relation Age of Onset    No Known Problems Mother      Diabetes Father             Allergies:  See list     Current Facility-Administered Medications          Current Outpatient Medications   Medication Sig Dispense Refill    glimepiride (AMARYL) 4 MG tablet Take 1 tablet by mouth every morning 30 tablet 3    SITagliptin (JANUVIA) 50 MG tablet Take 1 tablet by mouth daily 90 tablet 1    aspirin 81 MG EC tablet Take 1 tablet by mouth daily 30 tablet 0    atorvastatin (LIPITOR) 40 MG tablet Take 1 tablet by mouth nightly 30 tablet 0    metoprolol tartrate (LOPRESSOR) 25 MG tablet Take 0.5 tablets by mouth 2 times daily 60 tablet 0    hydrochlorothiazide (MICROZIDE) 12.5 MG capsule Take 1 capsule by mouth daily (Patient taking differently: Take 12.5 mg by mouth daily Does not take every day) 30 capsule 5    clobetasol prop emollient base (CLOBETASOL PROPIONATE E) 0.05 % CREA Apply topically daily 45 g 0    losartan (COZAAR) 50 MG tablet Take 1 tablet by mouth daily (Patient taking differently: Take 50 mg by mouth daily Does not take every day) 90 tablet 2    hydrocortisone 2.5 % cream Apply topically 2 times daily. 3.5 g 0    Glucose Blood (BLOOD GLUCOSE TEST STRIPS) STRP Please dispense strips  That are covered by her insurance and her meter  DX-E11.9  Pt tests 4 x day 100 strip 5    Lancets MISC Please dispense lancets to go with meter and test strips  DX E11.9 pt tests  each 5    calcium carbonate (OSCAL) 500 MG TABS tablet Take 500 mg by mouth daily        Multiple Vitamins-Minerals (THERAPEUTIC MULTIVITAMIN-MINERALS) tablet Take 1 tablet by mouth daily        Insulin Pen Needle (MEIJER PEN NEEDLES) 31G X 6 MM MISC 1 each by Does not apply route daily 100 each 3    metFORMIN (GLUCOPHAGE) 1000 MG tablet TAKE 1 TABLET BY MOUTH TWICE DAILY WITH MEALS 60 tablet 3    gabapentin (NEURONTIN) 100 MG capsule Take 1 capsule by mouth 2 times daily for 180 days. Intended supply: 90 days 180 capsule 1    clotrimazole-betamethasone (LOTRISONE) 1-0.05 % cream Apply topically 2 times daily.  15 g 1    Blood Glucose Monitoring Suppl MILANA 1 Units by Does not apply route once for 1 dose DX-E11.9 1 Device 0      No current facility-administered medications for this visit.            Social History   Social History            Socioeconomic History    Marital status:        Spouse name: None    Number of children: None    Years of education: None    Highest education level: None   Occupational History    None   Social Needs    Financial resource strain: Not hard at all   [place] insecurity       Worry: Never true       Inability: Never true    Transportation needs       Medical: No       Non-medical: No   Tobacco Use    Smoking status: Former Smoker       Packs/day: 0.50       Years: 20.00       Pack years: 10.00       Last attempt to quit: 10/3/1998       Years since quittin.7    Smokeless tobacco: Never Used   Substance and Sexual Activity    Alcohol use: No    Drug use: No       Types: Marijuana       Comment: as teenager    Sexual activity: None   Lifestyle    Physical activity       Days per week: None       Minutes per session: None    Stress: None   Relationships    Social connections       Talks on phone: None       Gets together: None       Attends Islam service: None       Active member of club or organization: None       Attends meetings of clubs or organizations: None       Relationship status: None    Intimate partner violence       Fear of current or ex partner: None       Emotionally abused: None       Physically abused: None       Forced sexual activity: None   Other Topics Concern    None   Social History Narrative    None           /80 (Site: Left Upper Arm, Position: Sitting, Cuff Size: Medium Adult)   Pulse 100   Temp 98.1 °F (36.7 °C) (Infrared)   Resp 18   Ht 5' 4\" (1.626 m)   Wt 152 lb (68.9 kg)   BMI 26.09 kg/m²       Physical Exam  Vitals signs and nursing note reviewed. Constitutional:       Appearance: She is well-developed. HENT:      Head: Normocephalic and atraumatic. Eyes:      General: No scleral icterus.      Conjunctiva/sclera: Conjunctivae normal. Pupils: Pupils are equal, round, and reactive to light. Neck:      Musculoskeletal: Normal range of motion and neck supple. Vascular: No JVD. Trachea: No tracheal deviation. Cardiovascular:      Rate and Rhythm: Normal rate and regular rhythm. Pulmonary:      Effort: Pulmonary effort is normal. No respiratory distress. Breath sounds: Normal breath sounds. Chest:      Chest wall: No tenderness. Abdominal:      General: Bowel sounds are normal. There is no distension. Palpations: Abdomen is soft. There is no mass. Tenderness: There is no abdominal tenderness. There is no guarding or rebound. Musculoskeletal: Normal range of motion. Lymphadenopathy:      Cervical: No cervical adenopathy. Skin:     General: Skin is warm and dry. Findings: No erythema or rash. Neurological:      Mental Status: She is alert and oriented to person, place, and time. Cranial Nerves: No cranial nerve deficit. Psychiatric:         Behavior: Behavior normal.         Thought Content: Thought content normal.         Judgment: Judgment normal.            IMAGING/LABS     XR CHEST STANDARD (2 VW)   Status: Final result    Order Providers      Authorizing  Billing    MD Omar Frank MD             Signed by      Signed  Date/Time    Phone  Pager    Bud Yuen  1/23/2020  12:41  [phone]      Reading Providers      Read  Date  Phone  Pager    Devaughn Mcgee  Jan 23, 2020  [phone]      All Reviewers List      Dea Cabrera RN on 1/28/2020 17:28    Radiation Dose Estimates      No radiation information found for this patient    Narrative    EXAM: XR CHEST (2 VW)         REASON FOR EXAM: Chest pain, shortness of breath.         COMPARISON: None.              TECHNIQUE: 2 views chest.              FINDINGS: Heart size normal. Lungs clear.  Bony thorax and upper abdomen normal.                   Impression         Negative chest.

## 2020-07-13 NOTE — OP NOTE
this  was able to be suction irrigated clear. The cecum, ascending colon, and  hepatic flexure were normal.  Transverse colon, splenic flexure, and  descending colon were all normal.  The descending colon and sigmoid were  a bit redundant with early diverticulosis. Upper, middle, and lower  portions of the rectum were normal.  On retroflexion, there were minimal  internal hemorrhoids. The colon was decompressed by suction as the  scope was removed. No biopsies were required. The patient tolerated  the procedure well and was transferred to PACU in stable condition. SPECIMENS:  None. DRAINS:  None. COMPLICATIONS:  None. DISPOSITION:  To PACU awake, alert and stable. Following recovery, we  will discharge the patient home with gradual advancement of diet and  activity as tolerated with instructions for a high-fiber, low-fat diet  with fiber supplementation. Followup will be with me in 1 to 2 weeks. We will recommend next screening colonoscopy in 4 or 5 years with a more  adequate prep.         Arlette Del Real    D: 07/13/2020 9:04:24       T: 07/13/2020 9:07:52     [id number]  Job#: [id number]     Doc#: [id number]    CC:  Jean Carlos Smith

## 2020-07-13 NOTE — BRIEF OP NOTE
Brief Postoperative Note      Patient: Cinthya Worrell  YOB: 1960  MRN: [id number]    Date of Procedure: 7/13/2020    Pre-Op Diagnosis:     1. Screening colonoscopy    Post-Op Diagnosis:      1. Early sigmoid diverticulosis       Procedure(s):      1. Colonoscopy anus to cecum    Surgeon(s):  Sam Hinojosa MD    Assistant:  * No surgical staff found *    Anesthesia: Monitor Anesthesia Care    Estimated Blood Loss (mL):  None    Complications: None    Specimens:  None    Findings:  Sub optimal prep. Plan repeat colonoscopy 4-5 years.     Dictated # [id number]      Electronically signed by Sam Hinojosa MD on 7/13/2020 at 8:58 AM

## 2020-07-13 NOTE — ANESTHESIA PRE PROCEDURE
Department of Anesthesiology  Preprocedure Note       Name:  Karl Harrington   Age:  64 y.o.  :  1960                                          MRN:  [id number]         Date:  2020      Surgeon: Aminah Amaya):  Amy Goodson MD    Procedure: Procedure(s):  COLONOSCOPY    Medications prior to admission:   Prior to Admission medications    Medication Sig Start Date End Date Taking? Authorizing Provider   metFORMIN (GLUCOPHAGE) 1000 MG tablet TAKE 1 TABLET BY MOUTH TWICE DAILY WITH MEALS 20  Yes Lashell Houston MD   gabapentin (NEURONTIN) 100 MG capsule Take 1 capsule by mouth 2 times daily for 180 days. Intended supply: 90 days  Patient taking differently: Take 100 mg by mouth as needed.  Intended supply: 90 days 20 Yes Lashell Houston MD   glimepiride (AMARYL) 4 MG tablet Take 1 tablet by mouth every morning 20  Yes Lashell Houston MD   SITagliptin (JANUVIA) 50 MG tablet Take 1 tablet by mouth daily 20  Yes Lashell Houston MD   aspirin 81 MG EC tablet Take 1 tablet by mouth daily 20  Yes Rodrigo Garcia DO   atorvastatin (LIPITOR) 40 MG tablet Take 1 tablet by mouth nightly 20  Yes Rodrigo Garcia DO   metoprolol tartrate (LOPRESSOR) 25 MG tablet Take 0.5 tablets by mouth 2 times daily 20  Yes Rodrigo Garcia DO   hydrochlorothiazide (MICROZIDE) 12.5 MG capsule Take 1 capsule by mouth daily  Patient taking differently: Take 12.5 mg by mouth daily Does not take every day 19  Yes Lashell Houston MD   Glucose Blood (BLOOD GLUCOSE TEST STRIPS) STRP Please dispense strips  That are covered by her insurance and her meter  DX-E11.9  Pt tests 4 x day 10/26/17  Yes Munir Ricketts MD   Lancets MISC Please dispense lancets to go with meter and test strips  DX E11.9 pt tests QID 10/26/17  Yes Munir Ricketts MD   calcium carbonate (OSCAL) 500 MG TABS tablet Take 500 mg by mouth daily   Yes Historical Provider, MD   Multiple Vitamins-Minerals (THERAPEUTIC MULTIVITAMIN-MINERALS) tablet Take 1 tablet by mouth daily   Yes Historical Provider, MD   clotrimazole-betamethasone (LOTRISONE) 1-0.05 % cream Apply topically 2 times daily. 20   Juanjo Solorio MD   clobetasol prop emollient base (CLOBETASOL PROPIONATE E) 0.05 % CREA Apply topically daily 19   Juanjo Solorio MD   hydrocortisone 2.5 % cream Apply topically 2 times daily.  18   Juanjo Solorio MD   Blood Glucose Monitoring Suppl MILANA 1 Units by Does not apply route once for 1 dose DX-E11.9 10/26/17 10/26/17  Baron John MD       Current medications:    Current Facility-Administered Medications   Medication Dose Route Frequency Provider Last Rate Last Dose    lactated ringers infusion   Intravenous Continuous Lars Schlatter,  mL/hr at 20 0750      sodium chloride flush 0.9 % injection 10 mL  10 mL Intravenous 2 times per day Lars Schlatter, MD        sodium chloride flush 0.9 % injection 10 mL  10 mL Intravenous PRN Lars Schlatter, MD           Allergies:  No Known Allergies    Problem List:    Patient Active Problem List   Diagnosis Code    Type 2 diabetes mellitus without complication, without long-term current use of insulin ([facility]) E11.9    Mixed hyperlipidemia E78.2    Essential hypertension I10    Anxiety F41.9    Diabetes mellitus ([address] E11.9    Hyperlipidemia E78.5    Hypertension I10    Nonobstructive atherosclerosis of coronary artery I25.10    Encounter for screening colonoscopy Z12.11       Past Medical History:        Diagnosis Date    Diabetes mellitus ([address]     Hyperlipidemia     Hypertension        Past Surgical History:        Procedure Laterality Date    BREAST BIOPSY Left     HYSTERECTOMY  1986    uterine cancer       Social History:    Social History     Tobacco Use    Smoking status: Former Smoker     Packs/day: 0.50     Years: 20.00     Pack years: 10.00     Last attempt to quit: 10/3/1998     Years since quittin.7    Smokeless tobacco: Never Used   Substance Use Topics    Alcohol use: No                                Counseling given: Not Answered      Vital Signs (Current):   Vitals:    07/13/20 0721   BP: 126/64   Pulse: 57   Resp: 16   Temp: 36.6 °C (97.8 °F)   TempSrc: Temporal   SpO2: 100%   Weight: 150 lb (68 kg)   Height: 5' 4\" (1.626 m)                                              BP Readings from Last 3 Encounters:   07/13/20 126/64   06/11/20 (!) 155/89   06/08/20 132/80       NPO Status: Time of last liquid consumption: 0500                        Time of last solid consumption: 1800                        Date of last liquid consumption: 07/13/20                        Date of last solid food consumption: 07/11/20    BMI:   Wt Readings from Last 3 Encounters:   07/13/20 150 lb (68 kg)   06/11/20 152 lb (68.9 kg)   06/08/20 152 lb (68.9 kg)     Body mass index is 25.75 kg/m².     CBC:   Lab Results   Component Value Date    WBC 7.3 01/28/2020    RBC 4.12 01/28/2020    HGB 12.9 01/28/2020    HCT 39.9 01/28/2020    MCV 96.8 01/28/2020    RDW 12.9 01/28/2020     01/28/2020       CMP:   Lab Results   Component Value Date     01/27/2020    K 4.1 01/27/2020     01/27/2020    CO2 26 01/27/2020    BUN 21 01/27/2020    CREATININE 0.60 01/27/2020    GFRAA >60 01/27/2020    LABGLOM >60 01/27/2020    GLUCOSE 159 01/27/2020    PROT 6.8 01/25/2020    CALCIUM 9.3 01/27/2020    BILITOT 0.26 01/25/2020    ALKPHOS 71 01/25/2020    AST 16 01/25/2020    ALT 18 01/25/2020       POC Tests:   Recent Labs     07/13/20  0734   POCGLU 115*       Coags:   Lab Results   Component Value Date    PROTIME 9.6 01/27/2020    INR 0.9 01/27/2020    APTT 25.1 01/27/2020       HCG (If Applicable): No results found for: PREGTESTUR, PREGSERUM, HCG, HCGQUANT     ABGs: No results found for: PHART, PO2ART, TMO4OJH, BNX3GZO, BEART, X9YOFJTV     Type & Screen (If Applicable):  No results found for: LABABO, LABRH    Drug/Infectious Status (If Applicable):  Lab Results   Component Value Date    HEPCAB NONREACTIVE 08/14/2017       COVID-19 Screening (If Applicable):   Lab Results   Component Value Date    COVID19 Not Detected 07/10/2020         Anesthesia Evaluation  Patient summary reviewed and Nursing notes reviewed no history of anesthetic complications:   Airway: Mallampati: III  TM distance: >3 FB   Neck ROM: full  Mouth opening: > = 3 FB Dental: normal exam         Pulmonary:Negative Pulmonary ROS and normal exam  breath sounds clear to auscultation                             Cardiovascular:Negative CV ROS    (+) hypertension:, CAD:,       ECG reviewed  Rhythm: regular  Rate: normal                    Neuro/Psych:   Negative Neuro/Psych ROS              GI/Hepatic/Renal: Neg GI/Hepatic/Renal ROS            Endo/Other:    (+) DiabetesType II DM, , .                 Abdominal:           Vascular: negative vascular ROS. Anesthesia Plan      MAC     ASA 3             Anesthetic plan and risks discussed with patient. Plan discussed with attending.                   BRENDA Spence - CRNA   7/13/2020

## 2020-08-12 PROBLEM — Z12.11 ENCOUNTER FOR SCREENING COLONOSCOPY: Status: RESOLVED | Noted: 2020-07-13 | Resolved: 2020-08-12

## 2020-08-13 ENCOUNTER — OFFICE VISIT (OUTPATIENT)
Dept: FAMILY MEDICINE CLINIC | Age: 60
End: 2020-08-13
Payer: COMMERCIAL

## 2020-08-13 VITALS
SYSTOLIC BLOOD PRESSURE: 138 MMHG | BODY MASS INDEX: 26.46 KG/M2 | WEIGHT: 155 LBS | HEIGHT: 64 IN | DIASTOLIC BLOOD PRESSURE: 86 MMHG | RESPIRATION RATE: 18 BRPM | HEART RATE: 66 BPM | OXYGEN SATURATION: 98 %

## 2020-08-13 PROCEDURE — 3052F HG A1C>EQUAL 8.0%<EQUAL 9.0%: CPT | Performed by: INTERNAL MEDICINE

## 2020-08-13 PROCEDURE — 99213 OFFICE O/P EST LOW 20 MIN: CPT | Performed by: INTERNAL MEDICINE

## 2020-08-13 RX ORDER — GLIMEPIRIDE 4 MG/1
4 TABLET ORAL 2 TIMES DAILY
Qty: 60 TABLET | Refills: 3 | Status: SHIPPED | OUTPATIENT
Start: 2020-08-13 | End: 2020-11-13 | Stop reason: SDUPTHER

## 2020-08-13 RX ORDER — LOSARTAN POTASSIUM 25 MG/1
25 TABLET ORAL DAILY
Qty: 90 TABLET | Refills: 1 | Status: SHIPPED | OUTPATIENT
Start: 2020-08-13 | End: 2020-11-13 | Stop reason: SDUPTHER

## 2020-08-13 RX ORDER — PIOGLITAZONEHYDROCHLORIDE 15 MG/1
15 TABLET ORAL DAILY
Qty: 30 TABLET | Refills: 3 | Status: SHIPPED | OUTPATIENT
Start: 2020-08-13 | End: 2020-11-13 | Stop reason: DRUGHIGH

## 2020-08-13 ASSESSMENT — ENCOUNTER SYMPTOMS
ABDOMINAL PAIN: 0
SORE THROAT: 0
NAUSEA: 0
COUGH: 0
SHORTNESS OF BREATH: 0
CHEST TIGHTNESS: 0

## 2020-08-13 NOTE — PROGRESS NOTES
HPI Notes    Name: Monica Wells  : 1960         Chief Complaint:     Chief Complaint   Patient presents with    Hypertension    Diabetes    Hyperlipidemia       History of Present Illness:        Sharmin Og presents to office to follow up for DM, Hyperlipidemia and HTN    States could not afford Januvia, was around $.400 so she is not taking it   C/O feeling tired, has pain in her shoulders and knees. Works at [employer] as a . States her work is hard and she works long hours. She has not tried anything for pain. Advised to try OTC Ibuprofen or Aleve and follow up if no alleviation        Diabetes   She presents for her follow-up diabetic visit. She has type 2 diabetes mellitus. Her disease course has been stable. Pertinent negatives for hypoglycemia include no dizziness, headaches or nervousness/anxiousness. Associated symptoms include fatigue. Pertinent negatives for diabetes include no chest pain. Symptoms are stable. Pertinent negatives for diabetic complications include no autonomic neuropathy, CVA, heart disease, nephropathy or peripheral neuropathy. Current diabetic treatment includes oral agent (dual therapy). She is compliant with treatment all of the time. She is following a generally healthy diet. Meal planning includes avoidance of concentrated sweets. Her overall blood glucose range is 180-200 mg/dl. An ACE inhibitor/angiotensin II receptor blocker is not being taken. Eye exam is not current. Hyperlipidemia   This is a chronic problem. The current episode started more than 1 year ago. The problem is controlled. Recent lipid tests were reviewed and are variable. Exacerbating diseases include diabetes. Associated symptoms include myalgias. Pertinent negatives include no chest pain or shortness of breath. Current antihyperlipidemic treatment includes statins. There are no compliance problems. Hypertension   This is a chronic problem.  The current episode started more than 1 year ago. The problem is unchanged. The problem is controlled. Pertinent negatives include no chest pain, headaches, palpitations or shortness of breath. There are no associated agents to hypertension. Risk factors for coronary artery disease include diabetes mellitus, dyslipidemia and post-menopausal state. Past treatments include diuretics and beta blockers. The current treatment provides significant improvement. There is no history of kidney disease, CAD/MI, CVA or heart failure. Past Medical History:     Past Medical History:   Diagnosis Date    Diabetes mellitus ([facility] [address]     Hyperlipidemia     Hypertension       Reviewed all health maintenance requirements and orderedappropriate tests  Health Maintenance Due   Topic Date Due    Diabetic retinal exam  01/19/2019       Past Surgical History:     Past Surgical History:   Procedure Laterality Date    BREAST BIOPSY Left     COLONOSCOPY N/A 7/13/2020    COLONOSCOPY performed by Kingston Whitman MD at [address] [place] [address]    uterine cancer        Medications:       Prior to Admission medications    Medication Sig Start Date End Date Taking? Authorizing Provider   glimepiride (AMARYL) 4 MG tablet Take 1 tablet by mouth 2 times daily 8/13/20  Yes Renny Manning MD   pioglitazone (ACTOS) 15 MG tablet Take 1 tablet by mouth daily 8/13/20  Yes Renny Manning MD   losartan (COZAAR) 25 MG tablet Take 1 tablet by mouth daily 8/13/20  Yes Renny Manning MD   metFORMIN (GLUCOPHAGE) 1000 MG tablet TAKE 1 TABLET BY MOUTH TWICE DAILY WITH MEALS 6/19/20  Yes Renny Manning MD   gabapentin (NEURONTIN) 100 MG capsule Take 1 capsule by mouth 2 times daily for 180 days. Intended supply: 90 days  Patient taking differently: Take 100 mg by mouth as needed. Intended supply: 90 days 6/11/20 12/8/20 Yes Renny Manning MD   clotrimazole-betamethasone (LOTRISONE) 1-0.05 % cream Apply topically 2 times daily.  6/11/20  Yes Renny Manning MD   aspirin 81 MG EC tablet Take 1 tablet by mouth daily 1/28/20  Yes Maria E Sharma,    atorvastatin (LIPITOR) 40 MG tablet Take 1 tablet by mouth nightly 1/27/20  Yes Maria E Sharma,    metoprolol tartrate (LOPRESSOR) 25 MG tablet Take 0.5 tablets by mouth 2 times daily 1/27/20  Yes Bryce Beck DO   clobetasol prop emollient base (CLOBETASOL PROPIONATE E) 0.05 % CREA Apply topically daily 4/22/19  Yes Luisa Landrum MD   hydrocortisone 2.5 % cream Apply topically 2 times daily. 4/5/18  Yes Luisa Landrum MD   Glucose Blood (BLOOD GLUCOSE TEST STRIPS) STRP Please dispense strips  That are covered by her insurance and her meter  DX-E11.9  Pt tests 4 x day 10/26/17  Yes Umesh Moralez MD   Lancets MISC Please dispense lancets to go with meter and test strips  DX E11.9 pt tests QID 10/26/17  Yes Umesh Morlaez MD   calcium carbonate (OSCAL) 500 MG TABS tablet Take 500 mg by mouth daily   Yes Historical Provider, MD   Multiple Vitamins-Minerals (THERAPEUTIC MULTIVITAMIN-MINERALS) tablet Take 1 tablet by mouth daily   Yes Historical Provider, MD   Blood Glucose Monitoring Suppl MILANA 1 Units by Does not apply route once for 1 dose DX-E11.9 10/26/17 10/26/17  Umesh Moralez MD        Allergies:       Patient has no known allergies. Social History:     Tobacco: reports that she quit smoking about 21 years ago. She has a 10.00 pack-year smoking history. She has never used smokeless tobacco.  Alcohol:      reports no history of alcohol use. Drug Use:  reports no history of drug use. Family History:     Family History   Problem Relation Age of Onset    No Known Problems Mother     Diabetes Father        Review of Systems:         Review of Systems   Constitutional: Positive for fatigue. Negative for activity change, appetite change, chills and fever. HENT: Negative for congestion and sore throat. Respiratory: Negative for cough, chest tightness and shortness of breath.     Cardiovascular: Negative for chest pain and palpitations. Gastrointestinal: Negative for abdominal pain and nausea. Genitourinary: Negative for dysuria. Musculoskeletal: Positive for arthralgias and myalgias. Skin: Negative for rash. Neurological: Negative for dizziness, syncope, numbness and headaches. Psychiatric/Behavioral: The patient is not nervous/anxious. Physical Exam:     Vitals:  /86   Pulse 66   Resp 18   Ht 5' 4\" (1.626 m)   Wt 155 lb (70.3 kg)   SpO2 98%   BMI 26.61 kg/m²       Physical Exam  Vitals signs reviewed. Constitutional:       General: She is not in acute distress. Appearance: She is well-developed. HENT:      Head: Normocephalic and atraumatic. Right Ear: Tympanic membrane, ear canal and external ear normal.      Left Ear: Tympanic membrane, ear canal and external ear normal.      Nose: Nose normal. No congestion. Mouth/Throat:      Mouth: Mucous membranes are moist.      Pharynx: Oropharynx is clear. Eyes:      General: No scleral icterus. Right eye: No discharge. Left eye: No discharge. Conjunctiva/sclera: Conjunctivae normal.   Neck:      Thyroid: No thyromegaly. Cardiovascular:      Rate and Rhythm: Normal rate and regular rhythm. Heart sounds: Normal heart sounds. No murmur. Pulmonary:      Effort: Pulmonary effort is normal.      Breath sounds: Normal breath sounds. No wheezing or rales. Abdominal:      General: Bowel sounds are normal. There is no distension. Palpations: Abdomen is soft. There is no mass. Tenderness: There is no abdominal tenderness. Musculoskeletal: Normal range of motion. General: No swelling, tenderness or deformity. Right lower leg: No edema. Left lower leg: No edema. Lymphadenopathy:      Cervical: No cervical adenopathy. Skin:     General: Skin is warm and dry. Coloration: Skin is not jaundiced or pale. Findings: No rash.    Neurological:      General: No focal deficit present. Mental Status: She is alert and oriented to person, place, and time. Mental status is at baseline. Psychiatric:         Mood and Affect: Mood normal.         Behavior: Behavior normal.         Judgment: Judgment normal.               Data:     Lab Results   Component Value Date     01/27/2020    K 4.1 01/27/2020     01/27/2020    CO2 26 01/27/2020    BUN 21 01/27/2020    CREATININE 0.60 01/27/2020    GLUCOSE 159 01/27/2020    PROT 6.8 01/25/2020    LABALBU 4.0 01/25/2020    BILITOT 0.26 01/25/2020    ALKPHOS 71 01/25/2020    AST 16 01/25/2020    ALT 18 01/25/2020     Lab Results   Component Value Date    WBC 7.3 01/28/2020    RBC 4.12 01/28/2020    HGB 12.9 01/28/2020    HCT 39.9 01/28/2020    MCV 96.8 01/28/2020    MCH 31.3 01/28/2020    MCHC 32.3 01/28/2020    RDW 12.9 01/28/2020     01/28/2020    MPV 11.5 01/28/2020     Lab Results   Component Value Date    TSH 2.22 01/23/2020     Lab Results   Component Value Date    CHOL 259 01/24/2020    HDL 89 01/24/2020    LABA1C 8.3 05/12/2020          Assessment & Plan        Diagnosis Orders   1. Type 2 diabetes mellitus without complication, without long-term current use of insulin (HCC)   Will change Glimepiride to 4 mg bid from qd, add Actos, continue on Metformin. Insurance did not cover [facility] [place]. Will follow up in 3 months and check HbA1C glimepiride (AMARYL) 4 MG tablet    pioglitazone (ACTOS) 15 MG tablet   2. Essential hypertension   Will add Losartan, stop HCTZ , continue on Lopressor    3.  Hyperlipidemia, unspecified hyperlipidemia type   Continue on Atorvastatin  losartan (COZAAR) 25 MG tablet                   Completed Refills   Requested Prescriptions     Signed Prescriptions Disp Refills    glimepiride (AMARYL) 4 MG tablet 60 tablet 3     Sig: Take 1 tablet by mouth 2 times daily    pioglitazone (ACTOS) 15 MG tablet 30 tablet 3     Sig: Take 1 tablet by mouth daily    losartan (COZAAR) 25 MG tablet 90 tablet 1     Sig: Take 1 tablet by mouth daily     Return in about 3 months (around 11/13/2020) for HTN, diabetes, hyperlipidemia. Orders Placed This Encounter   Medications    glimepiride (AMARYL) 4 MG tablet     Sig: Take 1 tablet by mouth 2 times daily     Dispense:  60 tablet     Refill:  3    pioglitazone (ACTOS) 15 MG tablet     Sig: Take 1 tablet by mouth daily     Dispense:  30 tablet     Refill:  3    losartan (COZAAR) 25 MG tablet     Sig: Take 1 tablet by mouth daily     Dispense:  90 tablet     Refill:  1     No orders of the defined types were placed in this encounter. There are no Patient Instructions on file for this visit.     Electronically signed by Emely Green MD on 8/13/2020 at 6:09 PM           Completed Refills      Requested Prescriptions     Signed Prescriptions Disp Refills    glimepiride (AMARYL) 4 MG tablet 60 tablet 3     Sig: Take 1 tablet by mouth 2 times daily    pioglitazone (ACTOS) 15 MG tablet 30 tablet 3     Sig: Take 1 tablet by mouth daily    losartan (COZAAR) 25 MG tablet 90 tablet 1     Sig: Take 1 tablet by mouth daily

## 2020-09-29 ENCOUNTER — OFFICE VISIT (OUTPATIENT)
Dept: FAMILY MEDICINE CLINIC | Age: 60
End: 2020-09-29
Payer: COMMERCIAL

## 2020-09-29 VITALS
SYSTOLIC BLOOD PRESSURE: 136 MMHG | WEIGHT: 157 LBS | DIASTOLIC BLOOD PRESSURE: 81 MMHG | HEIGHT: 64 IN | OXYGEN SATURATION: 98 % | RESPIRATION RATE: 18 BRPM | BODY MASS INDEX: 26.8 KG/M2 | HEART RATE: 82 BPM

## 2020-09-29 PROCEDURE — 99213 OFFICE O/P EST LOW 20 MIN: CPT | Performed by: INTERNAL MEDICINE

## 2020-09-29 ASSESSMENT — ENCOUNTER SYMPTOMS
ABDOMINAL PAIN: 0
SORE THROAT: 0
SHORTNESS OF BREATH: 0
NAUSEA: 0
COUGH: 0

## 2020-09-29 NOTE — PATIENT INSTRUCTIONS
SURVEY:    You may be receiving a survey from [employer] regarding your visit today. Please complete the survey to enable us to provide the highest quality of care to you and your family. If you cannot score us a very good on any question, please call the office to discuss how we could have made your experience a very good one. Thank you.

## 2020-10-01 ENCOUNTER — HOSPITAL ENCOUNTER (EMERGENCY)
Age: 60
Discharge: HOME | End: 2020-10-01
Attending: EMERGENCY MEDICINE
Payer: COMMERCIAL

## 2020-10-01 VITALS
OXYGEN SATURATION: 100 % | RESPIRATION RATE: 16 BRPM | DIASTOLIC BLOOD PRESSURE: 74 MMHG | TEMPERATURE: 98.1 F | SYSTOLIC BLOOD PRESSURE: 145 MMHG | HEART RATE: 60 BPM

## 2020-10-01 PROCEDURE — 99283 EMERGENCY DEPT VISIT LOW MDM: CPT

## 2020-10-01 PROCEDURE — 6370000000 HC RX 637 (ALT 250 FOR IP): Performed by: EMERGENCY MEDICINE

## 2020-10-01 PROCEDURE — 99284 EMERGENCY DEPT VISIT MOD MDM: CPT

## 2020-10-01 RX ORDER — PREDNISONE 20 MG/1
20 TABLET ORAL ONCE
Status: COMPLETED | OUTPATIENT
Start: 2020-10-01 | End: 2020-10-01

## 2020-10-01 RX ORDER — DIPHENHYDRAMINE HCL 25 MG
50 TABLET ORAL ONCE
Status: COMPLETED | OUTPATIENT
Start: 2020-10-01 | End: 2020-10-01

## 2020-10-01 RX ORDER — PREDNISONE 20 MG/1
20 TABLET ORAL DAILY
Qty: 3 TABLET | Refills: 0 | Status: SHIPPED | OUTPATIENT
Start: 2020-10-01 | End: 2020-10-04

## 2020-10-01 RX ADMIN — DIPHENHYDRAMINE HCL 50 MG: 25 TABLET, COATED ORAL at 06:46

## 2020-10-01 RX ADMIN — PREDNISONE 20 MG: 20 TABLET ORAL at 06:46

## 2020-10-01 NOTE — ED PROVIDER NOTES
eMERGENCY dEPARTMENT eNCOUnter        [facility]    Chief Complaint   Patient presents with    Facial Swelling     Patient arrives to ER today with complaints of a red rash and swelling to patients face that she noticed when she woke up this morning. Patient denies any new soaps or medications. HPI    Tate Del Rio is a 64 y.o. female who presents to ED from home. By car. With complaint of facial swelling. Onset this morning. I patient denies pain denies itching. Patient denies use of new medications or skin lotions. Location of symptoms face. Patient denies rash or itching anywhere else. Patient has been wearing a mask at work. REVIEW OF SYSTEMS    All systems reviewed and positives are in the HPI      PAST MEDICAL HISTORY    Past Medical History:   Diagnosis Date    Diabetes mellitus ([address]     Hyperlipidemia     Hypertension        SURGICAL HISTORY    Past Surgical History:   Procedure Laterality Date    BREAST BIOPSY Left     COLONOSCOPY N/A 7/13/2020    COLONOSCOPY performed by Hazel Sage MD at [facility]    uterine cancer       CURRENT MEDICATIONS    Current Outpatient Rx   Medication Sig Dispense Refill    predniSONE (DELTASONE) 20 MG tablet Take 1 tablet by mouth daily for 3 doses 3 tablet 0    glimepiride (AMARYL) 4 MG tablet Take 1 tablet by mouth 2 times daily 60 tablet 3    metFORMIN (GLUCOPHAGE) 1000 MG tablet TAKE 1 TABLET BY MOUTH TWICE DAILY WITH MEALS 60 tablet 3    gabapentin (NEURONTIN) 100 MG capsule Take 1 capsule by mouth 2 times daily for 180 days. Intended supply: 90 days (Patient taking differently: Take 100 mg by mouth as needed.  Intended supply: 90 days) 180 capsule 1    aspirin 81 MG EC tablet Take 1 tablet by mouth daily 30 tablet 0    calcium carbonate (OSCAL) 500 MG TABS tablet Take 500 mg by mouth daily      pioglitazone (ACTOS) 15 MG tablet Take 1 tablet by mouth daily 30 tablet 3    losartan (COZAAR) 25 MG Relationships    Social connections     Talks on phone: None     Gets together: None     Attends Zoroastrianism service: None     Active member of club or organization: None     Attends meetings of clubs or organizations: None     Relationship status: None    Intimate partner violence     Fear of current or ex partner: None     Emotionally abused: None     Physically abused: None     Forced sexual activity: None   Other Topics Concern    None   Social History Narrative    None       PHYSICAL EXAM    VITAL SIGNS: BP (!) 145/74   Pulse 60   Temp 98.1 °F (36.7 °C)   Resp 16   SpO2 100%   Constitutional:  Well developed, well nourished, no acute distress, non-toxic appearance   HENT:  Atraumatic, external ears normal, nose normal, oropharynx moist, no pharyngeal exudates. Neck- supple   Respiratory:  No respiratory distress, normal breath sounds   Cardiovascular:  Normal rate, normal rhythm, no murmurs, no gallops, no rubs   GI:  Soft, nondistended, normal bowel sounds, nontender, no organomegaly   Musculoskeletal:  No edema, no tenderness, no deformities. Integument: Mild facial erythema mild swelling, confluent rash. No tenderness to palpation      RADIOLOGY/PROCEDURES    No orders to display         Labs  Labs Reviewed - No data to display        Summation      Patient Course: Patient will be sent home on prednisone x3 days. The warning signs were discussed. Return to ED if worse. The etiology of the allergic reaction is unknown. ED Medications administered this visit:    Medications   predniSONE (DELTASONE) tablet 20 mg (has no administration in time range)   diphenhydrAMINE (BENADRYL) tablet 50 mg (has no administration in time range)       New Prescriptions from this visit:    New Prescriptions    PREDNISONE (DELTASONE) 20 MG TABLET    Take 1 tablet by mouth daily for 3 doses       Follow-up:  No follow-up provider specified. Final Impression:   1. Allergic reaction, initial encounter    2. Facial rash               (Please note that portions of this note were completed with a voice recognition program.  Efforts were made to edit the dictations but occasionally words are mis-transcribed.)            Scooby Buchanan MD  10/01/20 0118

## 2020-10-19 ENCOUNTER — HOSPITAL ENCOUNTER (EMERGENCY)
Age: 60
Discharge: HOME | End: 2020-10-19
Attending: FAMILY MEDICINE
Payer: COMMERCIAL

## 2020-10-19 VITALS
WEIGHT: 158.6 LBS | SYSTOLIC BLOOD PRESSURE: 159 MMHG | OXYGEN SATURATION: 100 % | BODY MASS INDEX: 27.08 KG/M2 | HEIGHT: 64 IN | HEART RATE: 70 BPM | TEMPERATURE: 98.5 F | DIASTOLIC BLOOD PRESSURE: 87 MMHG | RESPIRATION RATE: 20 BRPM

## 2020-10-19 LAB
ABSOLUTE EOS #: 0.1 K/UL (ref 0–0.4)
ABSOLUTE IMMATURE GRANULOCYTE: ABNORMAL K/UL (ref 0–0.3)
ABSOLUTE LYMPH #: 2.4 K/UL (ref 1–4.8)
ABSOLUTE MONO #: 0.5 K/UL (ref 0–1)
ALBUMIN SERPL-MCNC: 4.7 G/DL (ref 3.5–5.2)
ALBUMIN/GLOBULIN RATIO: ABNORMAL (ref 1–2.5)
ALP BLD-CCNC: 73 U/L (ref 35–104)
ALT SERPL-CCNC: 23 U/L (ref 5–33)
ANION GAP SERPL CALCULATED.3IONS-SCNC: 11 MMOL/L (ref 9–17)
AST SERPL-CCNC: 20 U/L
BASOPHILS # BLD: 1 % (ref 0–2)
BASOPHILS ABSOLUTE: 0 K/UL (ref 0–0.2)
BILIRUB SERPL-MCNC: 0.14 MG/DL (ref 0.3–1.2)
BUN BLDV-MCNC: 24 MG/DL (ref 6–20)
BUN/CREAT BLD: 38 (ref 9–20)
CALCIUM SERPL-MCNC: 10.3 MG/DL (ref 8.6–10.4)
CHLORIDE BLD-SCNC: 102 MMOL/L (ref 98–107)
CO2: 26 MMOL/L (ref 20–31)
CREAT SERPL-MCNC: 0.64 MG/DL (ref 0.5–0.9)
DIFFERENTIAL TYPE: YES
EOSINOPHILS RELATIVE PERCENT: 2 % (ref 0–5)
GFR AFRICAN AMERICAN: >60 ML/MIN
GFR NON-AFRICAN AMERICAN: >60 ML/MIN
GFR SERPL CREATININE-BSD FRML MDRD: ABNORMAL ML/MIN/{1.73_M2}
GFR SERPL CREATININE-BSD FRML MDRD: ABNORMAL ML/MIN/{1.73_M2}
GLUCOSE BLD-MCNC: 117 MG/DL (ref 70–99)
HCT VFR BLD CALC: 42.4 % (ref 36–46)
HEMOGLOBIN: 14.2 G/DL (ref 12–16)
IMMATURE GRANULOCYTES: ABNORMAL %
LYMPHOCYTES # BLD: 42 % (ref 15–40)
MCH RBC QN AUTO: 30.7 PG (ref 26–34)
MCHC RBC AUTO-ENTMCNC: 33.5 G/DL (ref 31–37)
MCV RBC AUTO: 91.8 FL (ref 80–100)
MONOCYTES # BLD: 8 % (ref 4–8)
NRBC AUTOMATED: ABNORMAL PER 100 WBC
PDW BLD-RTO: 13.8 % (ref 12.1–15.2)
PLATELET # BLD: 314 K/UL (ref 140–450)
PLATELET ESTIMATE: ABNORMAL
PMV BLD AUTO: ABNORMAL FL (ref 6–12)
POTASSIUM SERPL-SCNC: 4 MMOL/L (ref 3.7–5.3)
RBC # BLD: 4.62 M/UL (ref 4–5.2)
RBC # BLD: ABNORMAL 10*6/UL
SEG NEUTROPHILS: 47 % (ref 47–75)
SEGMENTED NEUTROPHILS ABSOLUTE COUNT: 2.8 K/UL (ref 2.5–7)
SODIUM BLD-SCNC: 139 MMOL/L (ref 135–144)
TOTAL PROTEIN: 7.5 G/DL (ref 6.4–8.3)
TSH SERPL DL<=0.05 MIU/L-ACNC: 3.82 MIU/L (ref 0.3–5)
WBC # BLD: 5.9 K/UL (ref 3.5–11)
WBC # BLD: ABNORMAL 10*3/UL

## 2020-10-19 PROCEDURE — 99283 EMERGENCY DEPT VISIT LOW MDM: CPT

## 2020-10-19 PROCEDURE — 85025 COMPLETE CBC W/AUTO DIFF WBC: CPT

## 2020-10-19 PROCEDURE — 84443 ASSAY THYROID STIM HORMONE: CPT

## 2020-10-19 PROCEDURE — 80053 COMPREHEN METABOLIC PANEL: CPT

## 2020-10-19 PROCEDURE — 36415 COLL VENOUS BLD VENIPUNCTURE: CPT

## 2020-10-19 RX ORDER — CITALOPRAM 20 MG/1
20 TABLET ORAL DAILY
Qty: 30 TABLET | Refills: 0 | Status: SHIPPED | OUTPATIENT
Start: 2020-10-19 | End: 2020-11-13 | Stop reason: SDUPTHER

## 2020-10-19 ASSESSMENT — PATIENT HEALTH QUESTIONNAIRE - PHQ9: SUM OF ALL RESPONSES TO PHQ QUESTIONS 1-9: 11

## 2020-10-19 NOTE — ED PROVIDER NOTES
[facility]  eMERGENCY dEPARTMENT eNCOUnter          CHIEF COMPLAINT       Chief Complaint   Patient presents with    Depression     pt has been very depressed off and on for awhile, pt states \"I do not like my job\"       Nurses Notes reviewed and I agree except as noted in the HPI. HISTORY OF PRESENT ILLNESS    Jane Parkinson is a 64 y.o. female who presents to the emergency room via private vehicle, patient complaining of depression, denying any suicidal or homicidal ideation to any alcohol or drug use. Patient states onset \"quite a while\", and has had prior. Patient states she has been on medication several years ago but cannot remember the name. Patient does acknowledge she has had decreased energy of late. REVIEW OF SYSTEMS     Review of Systems   All other systems reviewed and are negative. PAST MEDICAL HISTORY    has a past medical history of Diabetes mellitus ([facility] [address] Hyperlipidemia, and Hypertension. SURGICAL HISTORY      has a past surgical history that includes Hysterectomy (1986); Breast biopsy (Left); and Colonoscopy (N/A, 7/13/2020). CURRENT MEDICATIONS       Discharge Medication List as of 10/19/2020 12:29 PM      CONTINUE these medications which have NOT CHANGED    Details   glimepiride (AMARYL) 4 MG tablet Take 1 tablet by mouth 2 times daily, Disp-60 tablet,R-3Normal      metFORMIN (GLUCOPHAGE) 1000 MG tablet TAKE 1 TABLET BY MOUTH TWICE DAILY WITH MEALS, Disp-60 tablet,R-3Normal      pioglitazone (ACTOS) 15 MG tablet Take 1 tablet by mouth daily, Disp-30 tablet,R-3Normal      losartan (COZAAR) 25 MG tablet Take 1 tablet by mouth daily, Disp-90 tablet,R-1Normal      gabapentin (NEURONTIN) 100 MG capsule Take 1 capsule by mouth 2 times daily for 180 days. Intended supply: 90 days, Disp-180 capsule, R-1Normal      clotrimazole-betamethasone (LOTRISONE) 1-0.05 % cream Apply topically 2 times daily. , Disp-15 g, R-1, Normal      aspirin 81 MG EC tablet Take 1 tablet by mouth daily, Disp-30 tablet, R-0Normal      metoprolol tartrate (LOPRESSOR) 25 MG tablet Take 0.5 tablets by mouth 2 times daily, Disp-60 tablet, R-0Normal      clobetasol prop emollient base (CLOBETASOL PROPIONATE E) 0.05 % CREA Apply topically daily, Topical, DAILY Starting Mon 4/22/2019, Disp-45 g, R-0, OTC      hydrocortisone 2.5 % cream Apply topically 2 times daily. , Disp-3.5 g, R-0, Normal      Blood Glucose Monitoring Suppl MILANA ONCE Starting Thu 10/26/2017, 1 dose, Disp-1 Device, R-0, NormalDX-E11.9      Glucose Blood (BLOOD GLUCOSE TEST STRIPS) STRP Please dispense strips  That are covered by her insurance and her meter  DX-E11.9  Pt tests 4 x day, Disp-100 strip, R-5Normal      Lancets MISC Disp-100 each, R-5, NormalPlease dispense lancets to go with meter and test strips  DX E11.9 pt tests QID      calcium carbonate (OSCAL) 500 MG TABS tablet Take 500 mg by mouth dailyHistorical Med      Multiple Vitamins-Minerals (THERAPEUTIC MULTIVITAMIN-MINERALS) tablet Take 1 tablet by mouth dailyHistorical Med             ALLERGIES     has No Known Allergies. FAMILY HISTORY     She indicated that her mother is alive. She indicated that her father is alive. family history includes Diabetes in her father; No Known Problems in her mother. SOCIAL HISTORY      reports that she quit smoking about 22 years ago. She has a 10.00 pack-year smoking history. She has never used smokeless tobacco. She reports that she does not drink alcohol or use drugs. PHYSICAL EXAM     INITIAL VITALS:  height is 5' 4\" (1.626 m) and weight is 158 lb 9.6 oz (71.9 kg). Her oral temperature is 98.5 °F (36.9 °C). Her blood pressure is 159/87 (abnormal) and her pulse is 70. Her respiration is 20 and oxygen saturation is 100%. Physical Exam   Constitutional: Patient is oriented to person, place, and time. Patient appears well-developed and well-nourished. Patient is active and cooperative.     HENT:   Head: Normocephalic 20   Temp: 98.5 °F (36.9 °C)   TempSrc: Oral   SpO2: 100%   Weight: 158 lb 9.6 oz (71.9 kg)   Height: 5' 4\" (1.626 m)     Patient history and physical exam taken at bedside, discussed patient symptoms and exam findings, discussed getting some blood work would like to try to contact her PCP to discuss initiating depression medications, patient sitting upright in bed position of comfort, acknowledges    OARRS = 0    Per RN, patient states that she does not really want to see counseling but only is looking for medication at this time    Lab work-up reviewed    Case was discussed with Dr. Rica Cranker, PCP, regarding patient's presentation current work-up, advises have patient follow-up closely in office, can start Celexa 20 mg daily in the interim    Discussed with patient her overall work-up including my conversation with her PCP, discussed plan to start medication from the ER, close follow-up with her established primary care, patient acknowledges    FINAL IMPRESSION      1.  Depression, unspecified depression type          DISPOSITION/PLAN   Discharge    PATIENT REFERRED TO:  MD Constantin Mccracken [facility] [address] [place] [address]  [phone]    Schedule an appointment as soon as possible for a visit in 1 day      [facility] ED  [address] [id number] [phone]    As needed      DISCHARGE MEDICATIONS:  Discharge Medication List as of 10/19/2020 12:29 PM      START taking these medications    Details   citalopram (CELEXA) 20 MG tablet Take 1 tablet by mouth daily, Disp-30 tablet,R-0Normal                 Summation      Patient Course: Discharge    ED Medications administered this visit:  Medications - No data to display    New Prescriptions from this visit:    Discharge Medication List as of 10/19/2020 12:29 PM      START taking these medications    Details   citalopram (CELEXA) 20 MG tablet Take 1 tablet by mouth daily, Disp-30 tablet,R-0Normal             Follow-up:  Darryl Valdez

## 2020-10-19 NOTE — LETTER
[facility] ED  [address] [address]  Phone: [phone]               October 19, 2020    Patient: Hung García   YOB: 1960   Date of Visit: 10/19/2020       To Whom It May Concern:    Gabriela Ray was seen and treated in our emergency department on 10/19/2020. She may return to work on 10/20/20.       Sincerely,       Torie Payan RN         Signature:__________________________________

## 2020-10-19 NOTE — ED NOTES
Pt denies wanting any counseling at this time.  Pt states \"I want to try the medicine first\"     Rafa Shanks RN  10/19/20 0235

## 2020-10-20 ENCOUNTER — TELEPHONE (OUTPATIENT)
Dept: FAMILY MEDICINE CLINIC | Age: 60
End: 2020-10-20

## 2020-11-03 PROBLEM — I10 ESSENTIAL HYPERTENSION: Status: RESOLVED | Noted: 2017-08-14 | Resolved: 2020-11-03

## 2020-11-13 ENCOUNTER — OFFICE VISIT (OUTPATIENT)
Dept: FAMILY MEDICINE CLINIC | Age: 60
End: 2020-11-13
Payer: COMMERCIAL

## 2020-11-13 VITALS
WEIGHT: 157.2 LBS | DIASTOLIC BLOOD PRESSURE: 80 MMHG | SYSTOLIC BLOOD PRESSURE: 130 MMHG | HEART RATE: 60 BPM | TEMPERATURE: 97.2 F | BODY MASS INDEX: 26.98 KG/M2 | OXYGEN SATURATION: 96 %

## 2020-11-13 PROBLEM — F32.A DEPRESSION: Status: ACTIVE | Noted: 2020-11-13

## 2020-11-13 LAB — HBA1C MFR BLD: 8.1 %

## 2020-11-13 PROCEDURE — 90471 IMMUNIZATION ADMIN: CPT | Performed by: INTERNAL MEDICINE

## 2020-11-13 PROCEDURE — 3052F HG A1C>EQUAL 8.0%<EQUAL 9.0%: CPT | Performed by: INTERNAL MEDICINE

## 2020-11-13 PROCEDURE — 99214 OFFICE O/P EST MOD 30 MIN: CPT | Performed by: INTERNAL MEDICINE

## 2020-11-13 PROCEDURE — 90686 IIV4 VACC NO PRSV 0.5 ML IM: CPT | Performed by: INTERNAL MEDICINE

## 2020-11-13 PROCEDURE — 83036 HEMOGLOBIN GLYCOSYLATED A1C: CPT | Performed by: INTERNAL MEDICINE

## 2020-11-13 RX ORDER — LOSARTAN POTASSIUM 25 MG/1
25 TABLET ORAL DAILY
Qty: 90 TABLET | Refills: 1 | Status: SHIPPED | OUTPATIENT
Start: 2020-11-13 | End: 2021-06-11

## 2020-11-13 RX ORDER — GLIMEPIRIDE 4 MG/1
4 TABLET ORAL 2 TIMES DAILY
Qty: 60 TABLET | Refills: 3 | Status: SHIPPED | OUTPATIENT
Start: 2020-11-13 | End: 2021-05-20

## 2020-11-13 RX ORDER — PIOGLITAZONEHYDROCHLORIDE 45 MG/1
45 TABLET ORAL DAILY
Qty: 30 TABLET | Refills: 3 | Status: SHIPPED | OUTPATIENT
Start: 2020-11-13 | End: 2021-06-10

## 2020-11-13 RX ORDER — ATORVASTATIN CALCIUM 40 MG/1
40 TABLET, FILM COATED ORAL DAILY
Qty: 30 TABLET | Refills: 5 | Status: SHIPPED | OUTPATIENT
Start: 2020-11-13 | End: 2021-10-18

## 2020-11-13 RX ORDER — CITALOPRAM 20 MG/1
20 TABLET ORAL DAILY
Qty: 30 TABLET | Refills: 5 | Status: SHIPPED | OUTPATIENT
Start: 2020-11-13 | End: 2021-04-20

## 2020-11-13 RX ORDER — ASPIRIN 81 MG/1
81 TABLET ORAL DAILY
Qty: 30 TABLET | Refills: 0 | Status: SHIPPED | OUTPATIENT
Start: 2020-11-13

## 2020-11-13 ASSESSMENT — ENCOUNTER SYMPTOMS
ABDOMINAL PAIN: 0
NAUSEA: 0
BLURRED VISION: 0
SHORTNESS OF BREATH: 0
SORE THROAT: 0
COUGH: 0

## 2020-11-13 NOTE — PROGRESS NOTES
HPI Notes    Name: Ana Núñez  : 1960         Chief Complaint:     Chief Complaint   Patient presents with    Diabetes     Patient presents today for a 3 month check up. Patient states she's doing ok.  Hyperlipidemia    Hypertension    Flu Vaccine     Patient presents today for a flu shot. History of Present Illness:        Alina Raman presents to office to follow up for DM, HTN, Hyperlipidemia, depression    She went to ER on  complaining of feeling depressed. She denied suicidal ideations. She reported that she did not like her job and it was making her feel depressed. She was started on Celexa 20 mg daily    Alina Raman presents as a follow up on her depression. Current medication for depression includes Kellmckenzie Patel has been on this medication for 1 month . The medication is  being tolerated well. Since starting the antidepressant the symptoms of depression have significantly improved. Alina Raman  is not in counciling. Alina Raman denies suicidal ideation. Last HbA1C was  8.3% on 20    Diabetes   She presents for her follow-up diabetic visit. She has type 2 diabetes mellitus. Pertinent negatives for hypoglycemia include no headaches or nervousness/anxiousness. There are no diabetic associated symptoms. Pertinent negatives for diabetes include no blurred vision, no chest pain and no foot paresthesias. Pertinent negatives for diabetic complications include no CVA, heart disease, nephropathy or peripheral neuropathy. Risk factors for coronary artery disease include hypertension, dyslipidemia, post-menopausal and diabetes mellitus. Current diabetic treatment includes oral agent (dual therapy). She is compliant with treatment all of the time. An ACE inhibitor/angiotensin II receptor blocker is being taken. Eye exam is not current. Hypertension   This is a chronic problem. The current episode started more than 1 year ago. The problem is unchanged. The problem is controlled.  Pertinent tablet by mouth daily 11/13/20  Yes Bria Barth MD   Glucose Blood (BLOOD GLUCOSE TEST STRIPS) STRP Please dispense strips  That are covered by her insurance and her meter  DX-E11.9  Pt tests 4 x day 10/26/17  Yes Cosme Hicks MD   Lancets MISC Please dispense lancets to go with meter and test strips  DX E11.9 pt tests QID 10/26/17  Yes Cosme Hicks MD   Multiple Vitamins-Minerals (THERAPEUTIC MULTIVITAMIN-MINERALS) tablet Take 1 tablet by mouth daily   Yes Historical Provider, MD   losartan (COZAAR) 25 MG tablet Take 1 tablet by mouth daily  Patient not taking: Reported on 11/13/2020 8/13/20   Bria Barth MD   clotrimazole-betamethasone (LOTRISONE) 1-0.05 % cream Apply topically 2 times daily. Patient not taking: Reported on 11/13/2020 6/11/20   Bria Barth MD   clobetasol prop emollient base (CLOBETASOL PROPIONATE E) 0.05 % CREA Apply topically daily  Patient not taking: Reported on 11/13/2020 4/22/19   Bria Barth MD   hydrocortisone 2.5 % cream Apply topically 2 times daily. Patient not taking: Reported on 11/13/2020 4/5/18   Bria Barth MD   Blood Glucose Monitoring Suppl MILANA 1 Units by Does not apply route once for 1 dose DX-E11.9 10/26/17 10/26/17  Cosme Hicks MD   calcium carbonate (OSCAL) 500 MG TABS tablet Take 500 mg by mouth daily    Historical Provider, MD        Allergies:       Patient has no known allergies. Social History:     Tobacco: reports that she quit smoking about 22 years ago. She has a 10.00 pack-year smoking history. She has never used smokeless tobacco.  Alcohol:      reports no history of alcohol use. Drug Use:  reports no history of drug use. Family History:     Family History   Problem Relation Age of Onset    No Known Problems Mother     Diabetes Father        Review of Systems:         Review of Systems   Constitutional: Negative for chills and fever. HENT: Negative for congestion and sore throat.     Eyes: Negative for blurred vision. Respiratory: Negative for cough and shortness of breath. Cardiovascular: Negative for chest pain and palpitations. Gastrointestinal: Negative for abdominal pain and nausea. Genitourinary: Negative for dysuria. Skin: Negative for rash. Neurological: Negative for headaches. Psychiatric/Behavioral: Positive for dysphoric mood. The patient is not nervous/anxious. Physical Exam:     Vitals:  /80 (Site: Left Upper Arm, Position: Sitting, Cuff Size: Medium Adult)   Pulse 60   Temp 97.2 °F (36.2 °C)   Wt 157 lb 3.2 oz (71.3 kg)   SpO2 96%   BMI 26.98 kg/m²       Physical Exam  Vitals signs reviewed. Constitutional:       General: She is not in acute distress. Appearance: Normal appearance. She is well-developed. HENT:      Head: Normocephalic and atraumatic. Eyes:      General: No scleral icterus. Right eye: No discharge. Left eye: No discharge. Conjunctiva/sclera: Conjunctivae normal.   Neck:      Thyroid: No thyromegaly. Cardiovascular:      Rate and Rhythm: Normal rate and regular rhythm. Heart sounds: Normal heart sounds. No murmur. Pulmonary:      Effort: Pulmonary effort is normal.      Breath sounds: Normal breath sounds. No wheezing or rales. Abdominal:      General: Bowel sounds are normal. There is no distension. Palpations: Abdomen is soft. There is no mass. Tenderness: There is no abdominal tenderness. Musculoskeletal: Normal range of motion. Right lower leg: No edema. Left lower leg: No edema. Lymphadenopathy:      Cervical: No cervical adenopathy. Skin:     General: Skin is warm and dry. Coloration: Skin is not jaundiced or pale. Findings: No rash. Neurological:      General: No focal deficit present. Mental Status: She is alert and oriented to person, place, and time. Mental status is at baseline.    Psychiatric:         Mood and Affect: Mood normal.         Behavior: Behavior normal. Thought Content: Thought content normal.         Judgment: Judgment normal.               Data:     Lab Results   Component Value Date     10/19/2020    K 4.0 10/19/2020     10/19/2020    CO2 26 10/19/2020    BUN 24 10/19/2020    CREATININE 0.64 10/19/2020    GLUCOSE 117 10/19/2020    PROT 7.5 10/19/2020    LABALBU 4.7 10/19/2020    BILITOT 0.14 10/19/2020    ALKPHOS 73 10/19/2020    AST 20 10/19/2020    ALT 23 10/19/2020     Lab Results   Component Value Date    WBC 5.9 10/19/2020    RBC 4.62 10/19/2020    HGB 14.2 10/19/2020    HCT 42.4 10/19/2020    MCV 91.8 10/19/2020    MCH 30.7 10/19/2020    MCHC 33.5 10/19/2020    RDW 13.8 10/19/2020     10/19/2020    MPV NOT REPORTED 10/19/2020     Lab Results   Component Value Date    TSH 3.82 10/19/2020     Lab Results   Component Value Date    CHOL 259 01/24/2020    HDL 89 01/24/2020    LABA1C 8.3 05/12/2020          Assessment & Plan        Diagnosis Orders   1. Type 2 diabetes mellitus without complication, without long-term current use of insulin (HCC)   Needs better glycemic control. HbA1C was 8.1%. will add Actos 45 mg/day. Follow up in 3 months. Refer for eye examination . POCT glycosylated hemoglobin (Hb A1C)    glimepiride (AMARYL) 4 MG tablet    metFORMIN (GLUCOPHAGE) 1000 MG tablet    pioglitazone (ACTOS) 45 MG tablet    External Referral To Ophthalmology   2. Essential hypertension   BP is controlled, continue on Losartan Lipid Panel   3. Hyperlipidemia, unspecified hyperlipidemia type   LDL was elevated. Will start on Lipitor. Check Lipid panel and LFT atorvastatin (LIPITOR) 40 MG tablet    Hepatic Function Panel    Basic Metabolic Panel   4. Depression, unspecified depression type   Mood is improved/stable, will continue on Celexa. citalopram (CELEXA) 20 MG tablet   5.  Need for influenza vaccination  INFLUENZA, QUADV, 3 YRS AND OLDER, IM PF, PREFILL SYR OR SDV, 0.5ML (AFLURIA QUADV, PF)                   Completed Refills Standing Expiration Date:   11/13/2021    External Referral To Ophthalmology     Referral Priority:   Routine     Referral Type:   Eval and Treat     Referral Reason:   Specialty Services Required     Referred to Provider:   Tiffanie Jansen     Requested Specialty:   Ophthalmology     Number of Visits Requested:   1    POCT glycosylated hemoglobin (Hb A1C)         Patient Instructions   SURVEY:    You may be receiving a survey from [employer] regarding your visit today. Please complete the survey to enable us to provide the highest quality of care to you and your family. If you cannot score us a very good on any question, please call the office to discuss how we could of made your experience a very good one. Thank you. You may be receiving a survey from [employer] regarding your visit today. You may get this in the mail, through your MyChart or in your email. Please complete the survey to enable us to provide the highest quality of care to you and your family. If you cannot score us as very good ( 5 Stars) on any question, please feel free to call the office to discuss how we could have made your experience exceptional.     Thank You!       MD Rachel Hannah RMA Minnie Player, PCA        Electronically signed by Tre De Anda MD on 11/13/2020 at 2:49 PM           Completed Refills      Requested Prescriptions     Signed Prescriptions Disp Refills    citalopram (CELEXA) 20 MG tablet 30 tablet 5     Sig: Take 1 tablet by mouth daily    glimepiride (AMARYL) 4 MG tablet 60 tablet 3     Sig: Take 1 tablet by mouth 2 times daily    metFORMIN (GLUCOPHAGE) 1000 MG tablet 60 tablet 5     Sig: Take 1 tablet by mouth 2 times daily (with meals)    aspirin 81 MG EC tablet 30 tablet 0     Sig: Take 1 tablet by mouth daily    pioglitazone (ACTOS) 45 MG tablet 30 tablet 3     Sig: Take 1 tablet by mouth daily    atorvastatin (LIPITOR) 40 MG tablet 30 tablet 5     Sig: Take 1 tablet by

## 2020-11-13 NOTE — PROGRESS NOTES
After obtaining consent, and per orders of Dr. Noreen Aguillon, injection of afluria given in Left deltoid by Lilia Simon. Patient instructed to remain in clinic for 20 minutes afterwards, and to report any adverse reaction to me immediately. Vaccine Information Sheet, \"Influenza - Inactivated\"  given to Jane Parkinson, or parent/legal guardian of  Jane Parkinson and verbalized understanding. Patient responses:    Have you ever had a reaction to a flu vaccine? No  Are you able to eat eggs without adverse effects? Yes  Do you have any current illness? No  Have you ever had [place] Syndrome? No    Flu vaccine given per order. Please see immunization tab.

## 2020-12-02 ENCOUNTER — TELEPHONE (OUTPATIENT)
Dept: FAMILY MEDICINE CLINIC | Age: 60
End: 2020-12-02

## 2020-12-08 ENCOUNTER — HOSPITAL ENCOUNTER (OUTPATIENT)
Age: 60
Discharge: HOME | End: 2020-12-08
Payer: COMMERCIAL

## 2020-12-08 LAB
ALBUMIN SERPL-MCNC: 4.2 G/DL (ref 3.5–5.2)
ALBUMIN/GLOBULIN RATIO: ABNORMAL (ref 1–2.5)
ALP BLD-CCNC: 74 U/L (ref 35–104)
ALT SERPL-CCNC: 27 U/L (ref 5–33)
ANION GAP SERPL CALCULATED.3IONS-SCNC: 8 MMOL/L (ref 9–17)
AST SERPL-CCNC: 20 U/L
BILIRUB SERPL-MCNC: 0.24 MG/DL (ref 0.3–1.2)
BILIRUBIN DIRECT: <0.08 MG/DL
BILIRUBIN, INDIRECT: ABNORMAL MG/DL (ref 0–1)
BUN BLDV-MCNC: 20 MG/DL (ref 6–20)
BUN/CREAT BLD: 32 (ref 9–20)
CALCIUM SERPL-MCNC: 9.2 MG/DL (ref 8.6–10.4)
CHLORIDE BLD-SCNC: 102 MMOL/L (ref 98–107)
CHOLESTEROL/HDL RATIO: 2.3
CHOLESTEROL: 172 MG/DL
CO2: 27 MMOL/L (ref 20–31)
CREAT SERPL-MCNC: 0.62 MG/DL (ref 0.5–0.9)
GFR AFRICAN AMERICAN: >60 ML/MIN
GFR NON-AFRICAN AMERICAN: >60 ML/MIN
GFR SERPL CREATININE-BSD FRML MDRD: ABNORMAL ML/MIN/{1.73_M2}
GFR SERPL CREATININE-BSD FRML MDRD: ABNORMAL ML/MIN/{1.73_M2}
GLOBULIN: ABNORMAL G/DL (ref 1.5–3.8)
GLUCOSE BLD-MCNC: 169 MG/DL (ref 70–99)
HDLC SERPL-MCNC: 75 MG/DL
LDL CHOLESTEROL: 81 MG/DL (ref 0–130)
PATIENT FASTING?: YES
POTASSIUM SERPL-SCNC: 4 MMOL/L (ref 3.7–5.3)
SODIUM BLD-SCNC: 137 MMOL/L (ref 135–144)
TOTAL PROTEIN: 6.9 G/DL (ref 6.4–8.3)
TRIGL SERPL-MCNC: 79 MG/DL
VLDLC SERPL CALC-MCNC: NORMAL MG/DL (ref 1–30)

## 2020-12-08 PROCEDURE — 80048 BASIC METABOLIC PNL TOTAL CA: CPT

## 2020-12-08 PROCEDURE — 36415 COLL VENOUS BLD VENIPUNCTURE: CPT

## 2020-12-08 PROCEDURE — 80076 HEPATIC FUNCTION PANEL: CPT

## 2020-12-08 PROCEDURE — 80061 LIPID PANEL: CPT

## 2020-12-10 ENCOUNTER — TELEPHONE (OUTPATIENT)
Dept: FAMILY MEDICINE CLINIC | Age: 60
End: 2020-12-10

## 2020-12-10 NOTE — TELEPHONE ENCOUNTER
----- Message from Bria Barth MD sent at 12/10/2020  8:51 AM EST -----  Please let the patient know that lab results are okay  Thank you

## 2021-04-19 DIAGNOSIS — F32.A DEPRESSION, UNSPECIFIED DEPRESSION TYPE: ICD-10-CM

## 2021-04-19 DIAGNOSIS — E11.9 TYPE 2 DIABETES MELLITUS WITHOUT COMPLICATION, WITHOUT LONG-TERM CURRENT USE OF INSULIN (HCC): ICD-10-CM

## 2021-04-20 RX ORDER — CITALOPRAM 20 MG/1
20 TABLET ORAL DAILY
Qty: 30 TABLET | Refills: 5 | Status: SHIPPED | OUTPATIENT
Start: 2021-04-20

## 2021-05-19 DIAGNOSIS — E11.9 TYPE 2 DIABETES MELLITUS WITHOUT COMPLICATION, WITHOUT LONG-TERM CURRENT USE OF INSULIN (HCC): ICD-10-CM

## 2021-05-20 RX ORDER — GLIMEPIRIDE 4 MG/1
4 TABLET ORAL 2 TIMES DAILY
Qty: 60 TABLET | Refills: 3 | Status: SHIPPED | OUTPATIENT
Start: 2021-05-20 | End: 2022-05-05

## 2021-05-20 NOTE — TELEPHONE ENCOUNTER
Refill request  Last OV 11/13/2020  Last date RX filled 11/13/20  Next scheduled appt Visit date not found  Medication pended    Health Maintenance   Topic Date Due    COVID-19 Vaccine (1) Never done    Hepatitis B vaccine (1 of 3 - Risk 3-dose series) Never done    Cervical cancer screen  Never done    Shingles Vaccine (1 of 2) Never done    Diabetic retinal exam  01/19/2019    Diabetic foot exam  05/12/2021    Diabetic microalbuminuria test  05/12/2021    A1C test (Diabetic or Prediabetic)  11/13/2021    Lipid screen  12/08/2021    Potassium monitoring  12/08/2021    Creatinine monitoring  12/08/2021    Breast cancer screen  06/03/2022    Pneumococcal 0-64 years Vaccine (2 of 2) 06/09/2027    DTaP/Tdap/Td vaccine (2 - Td) 11/14/2027    Colon cancer screen colonoscopy  07/13/2030    Flu vaccine  Completed    Hepatitis C screen  Completed    HIV screen  Completed    Hepatitis A vaccine  Aged Out    Hib vaccine  Aged Out    Meningococcal (ACWY) vaccine  Aged Out             (applicable per patient's age: Cancer Screenings, Depression Screening, Fall Risk Screening, Immunizations)    Hemoglobin A1C (%)   Date Value   11/13/2020 8.1   05/12/2020 8.3   01/24/2020 7.9 (H)     Microalb/Crt. Ratio (mcg/mg creat)   Date Value   01/22/2019 CANNOT BE CALCULATED     LDL Cholesterol (mg/dL)   Date Value   12/08/2020 81     AST (U/L)   Date Value   12/08/2020 20     ALT (U/L)   Date Value   12/08/2020 27     BUN (mg/dL)   Date Value   12/08/2020 20      (goal A1C is < 7)   (goal LDL is <100) need 30-50% reduction from baseline     BP Readings from Last 3 Encounters:   11/13/20 130/80   10/19/20 (!) 159/87   10/01/20 (!) 145/74    (goal /80)      All Future Testing planned in [facility]:  Lab Frequency Next Occurrence   XR KNEE RIGHT (3 VIEWS) Once 09/29/2020   XR KNEE LEFT (3 VIEWS) Once 09/29/2020   XR HIP 2-3 VW W PELVIS RIGHT Once 09/29/2020       Next Visit Date:  No future appointments. Patient Active Problem List:     Type 2 diabetes mellitus without complication, without long-term current use of insulin (HCC)     Mixed hyperlipidemia     Anxiety     Hyperlipidemia     Hypertension     Nonobstructive atherosclerosis of coronary artery     Depression

## 2021-06-10 ENCOUNTER — OFFICE VISIT (OUTPATIENT)
Dept: FAMILY MEDICINE CLINIC | Age: 61
End: 2021-06-10
Payer: COMMERCIAL

## 2021-06-10 VITALS
WEIGHT: 161.6 LBS | OXYGEN SATURATION: 96 % | HEART RATE: 85 BPM | TEMPERATURE: 98.1 F | SYSTOLIC BLOOD PRESSURE: 136 MMHG | DIASTOLIC BLOOD PRESSURE: 84 MMHG | BODY MASS INDEX: 27.74 KG/M2

## 2021-06-10 DIAGNOSIS — I10 ESSENTIAL HYPERTENSION: ICD-10-CM

## 2021-06-10 DIAGNOSIS — E78.5 HYPERLIPIDEMIA, UNSPECIFIED HYPERLIPIDEMIA TYPE: ICD-10-CM

## 2021-06-10 DIAGNOSIS — K21.9 GASTROESOPHAGEAL REFLUX DISEASE WITHOUT ESOPHAGITIS: ICD-10-CM

## 2021-06-10 DIAGNOSIS — R10.13 EPIGASTRIC ABDOMINAL PAIN: ICD-10-CM

## 2021-06-10 DIAGNOSIS — E11.9 TYPE 2 DIABETES MELLITUS WITHOUT COMPLICATION, WITHOUT LONG-TERM CURRENT USE OF INSULIN (HCC): Primary | ICD-10-CM

## 2021-06-10 DIAGNOSIS — R05.9 COUGH: ICD-10-CM

## 2021-06-10 LAB — HBA1C MFR BLD: 9.6 %

## 2021-06-10 PROCEDURE — 83036 HEMOGLOBIN GLYCOSYLATED A1C: CPT | Performed by: INTERNAL MEDICINE

## 2021-06-10 PROCEDURE — 99214 OFFICE O/P EST MOD 30 MIN: CPT | Performed by: INTERNAL MEDICINE

## 2021-06-10 RX ORDER — OMEPRAZOLE 20 MG/1
20 CAPSULE, DELAYED RELEASE ORAL 2 TIMES DAILY
Qty: 60 CAPSULE | Refills: 3 | Status: SHIPPED | OUTPATIENT
Start: 2021-06-10 | End: 2021-10-18 | Stop reason: SDUPTHER

## 2021-06-10 RX ORDER — DULAGLUTIDE 0.75 MG/.5ML
0.75 INJECTION, SOLUTION SUBCUTANEOUS WEEKLY
Qty: 4 PEN | Refills: 0 | Status: SHIPPED | OUTPATIENT
Start: 2021-06-10 | End: 2021-10-18 | Stop reason: SDUPTHER

## 2021-06-10 SDOH — ECONOMIC STABILITY: FOOD INSECURITY: WITHIN THE PAST 12 MONTHS, YOU WORRIED THAT YOUR FOOD WOULD RUN OUT BEFORE YOU GOT MONEY TO BUY MORE.: NEVER TRUE

## 2021-06-10 SDOH — ECONOMIC STABILITY: FOOD INSECURITY: WITHIN THE PAST 12 MONTHS, THE FOOD YOU BOUGHT JUST DIDN'T LAST AND YOU DIDN'T HAVE MONEY TO GET MORE.: NEVER TRUE

## 2021-06-10 ASSESSMENT — ENCOUNTER SYMPTOMS
WHEEZING: 0
DIARRHEA: 0
VOMITING: 0
BELCHING: 0
SHORTNESS OF BREATH: 0
SORE THROAT: 0
COUGH: 1
ABDOMINAL PAIN: 1
RHINORRHEA: 0
BLURRED VISION: 0
NAUSEA: 1

## 2021-06-10 ASSESSMENT — PATIENT HEALTH QUESTIONNAIRE - PHQ9
SUM OF ALL RESPONSES TO PHQ QUESTIONS 1-9: 0
1. LITTLE INTEREST OR PLEASURE IN DOING THINGS: 0
2. FEELING DOWN, DEPRESSED OR HOPELESS: 0
SUM OF ALL RESPONSES TO PHQ9 QUESTIONS 1 & 2: 0
SUM OF ALL RESPONSES TO PHQ QUESTIONS 1-9: 0
SUM OF ALL RESPONSES TO PHQ QUESTIONS 1-9: 0

## 2021-06-10 ASSESSMENT — SOCIAL DETERMINANTS OF HEALTH (SDOH): HOW HARD IS IT FOR YOU TO PAY FOR THE VERY BASICS LIKE FOOD, HOUSING, MEDICAL CARE, AND HEATING?: SOMEWHAT HARD

## 2021-06-10 ASSESSMENT — CROHNS DISEASE ACTIVITY INDEX (CDAI): CDAI SCORE: 0

## 2021-06-10 NOTE — PROGRESS NOTES
HPI Notes    Name: Nas Daily  : 1960         Chief Complaint:     Chief Complaint   Patient presents with    Cough     Patient c/o a dry cough she's had for a year or longer she can not get rid of     Abdominal Pain     Patient c/o stomach problems that began 2-3 weeks ago.  Diabetes     Patient states blood sugar has been running high and she is unable to lower it. History of Present Illness:        Andre Garrett presents to office for evaluation of cough, epigastric abdominal pain. Also wants to follow up for DM, HTN. Reports having dry cough for almost 1 year  States when goes to bed and lies down she feels that stomach juice comes out to her mouth and it's sour. Cough  This is a chronic problem. The current episode started more than 1 year ago. The problem has been unchanged. The problem occurs every few hours. The cough is non-productive. Pertinent negatives include no chest pain, chills, ear congestion, ear pain, fever, headaches, myalgias, rash, rhinorrhea, sore throat, shortness of breath, weight loss or wheezing. The symptoms are aggravated by lying down. She has tried nothing for the symptoms. There is no history of asthma. Abdominal Pain  This is a new problem. The onset quality is gradual. The problem occurs 2 to 4 times per day. The problem has been gradually worsening. The pain is located in the epigastric region. The pain is moderate. Quality: Discomfort like. The abdominal pain does not radiate. Associated symptoms include nausea. Pertinent negatives include no belching, diarrhea, dysuria, fever, headaches, hematuria, melena, myalgias, vomiting or weight loss. The pain is aggravated by certain positions and eating. Relieved by: OTC antacids. She has tried H2 blockers for the symptoms. The treatment provided mild relief. There is no history of abdominal surgery, colon cancer, Crohn's disease, gallstones, GERD or PUD.    Diabetes  She presents for her follow-up diabetic visit. She has type 2 diabetes mellitus. There are no hypoglycemic associated symptoms. Pertinent negatives for hypoglycemia include no headaches or nervousness/anxiousness. Pertinent negatives for diabetes include no blurred vision, no chest pain, no foot paresthesias, no foot ulcerations and no weight loss. Pertinent negatives for diabetic complications include no CVA, heart disease or peripheral neuropathy. Risk factors for coronary artery disease include diabetes mellitus, dyslipidemia, hypertension and post-menopausal. Current diabetic treatment includes oral agent (dual therapy) (Stopped taking Actos does not remember why). She is compliant with treatment all of the time. She is following a generally unhealthy diet. An ACE inhibitor/angiotensin II receptor blocker is being taken. Eye exam is not current. Hypertension  This is a chronic problem. The current episode started more than 1 year ago. The problem is unchanged. Pertinent negatives include no blurred vision, chest pain, headaches, palpitations, peripheral edema or shortness of breath. There are no associated agents to hypertension. Past treatments include angiotensin blockers. The current treatment provides significant improvement. There are no compliance problems. There is no history of CVA.            Past Medical History:     Past Medical History:   Diagnosis Date    Diabetes mellitus ([facility] [address]     Hyperlipidemia     Hypertension       Reviewed all health maintenance requirements and orderedappropriate tests  Health Maintenance Due   Topic Date Due    Hepatitis B vaccine (1 of 3 - Risk 3-dose series) Never done    Cervical cancer screen  Never done    Shingles Vaccine (1 of 2) Never done    Diabetic retinal exam  01/19/2019    Diabetic foot exam  05/12/2021    Diabetic microalbuminuria test  05/12/2021       Past Surgical History:     Past Surgical History:   Procedure Laterality Date    BREAST BIOPSY Left     COLONOSCOPY N/A 7/13/2020 COLONOSCOPY performed by Gabriela Escobar MD at [address] [facility]    uterine cancer        Medications:       Prior to Admission medications    Medication Sig Start Date End Date Taking? Authorizing Provider   omeprazole (PRILOSEC) 20 MG delayed release capsule Take 1 capsule by mouth 2 times daily 6/10/21  Yes Lindsay Caballero MD   Dulaglutide (TRULICITY) 8.77 GV/7.5FT SOPN Inject 0.75 mg into the skin once a week 6/10/21  Yes Lindsay Caballero MD   glimepiride (AMARYL) 4 MG tablet Take 1 tablet by mouth 2 times daily 5/20/21  Yes Lindsay Caballero MD   citalopram (CELEXA) 20 MG tablet Take 1 tablet by mouth daily 4/20/21  Yes Lindsay Caballero MD   metFORMIN (GLUCOPHAGE) 1000 MG tablet Take 1 tablet by mouth 2 times daily (with meals) 4/20/21  Yes Lindsay Caballero MD   losartan (COZAAR) 25 MG tablet Take 1 tablet by mouth daily 11/13/20  Yes Lindsay Caballero MD   Glucose Blood (BLOOD GLUCOSE TEST STRIPS) STRP Please dispense strips  That are covered by her insurance and her meter  DX-E11.9  Pt tests 4 x day 10/26/17  Yes Rosalva Rice MD   Lancets MISC Please dispense lancets to go with meter and test strips  DX E11.9 pt tests QID 10/26/17  Yes Rosalva Rice MD   Multiple Vitamins-Minerals (THERAPEUTIC MULTIVITAMIN-MINERALS) tablet Take 1 tablet by mouth daily   Yes Historical Provider, MD   aspirin 81 MG EC tablet Take 1 tablet by mouth daily  Patient not taking: Reported on 6/10/2021 11/13/20   Lindsay Caballero MD   atorvastatin (LIPITOR) 40 MG tablet Take 1 tablet by mouth daily  Patient not taking: Reported on 6/10/2021 11/13/20   Lindsay Caballero MD   clotrimazole-betamethasone (LOTRISONE) 1-0.05 % cream Apply topically 2 times daily.   Patient not taking: Reported on 11/13/2020 6/11/20   Lindsay Caballero MD   clobetasol prop emollient base (CLOBETASOL PROPIONATE E) 0.05 % CREA Apply topically daily  Patient not taking: Reported on 11/13/2020 4/22/19   Lindsay Caballero MD hydrocortisone 2.5 % cream Apply topically 2 times daily. Patient not taking: Reported on 11/13/2020 4/5/18   Guerda Haynes MD   Blood Glucose Monitoring Suppl MILANA 1 Units by Does not apply route once for 1 dose DX-E11.9 10/26/17 10/26/17  Tamiko Alegre MD   calcium carbonate (OSCAL) 500 MG TABS tablet Take 500 mg by mouth daily  Patient not taking: Reported on 6/10/2021    Historical Provider, MD        Allergies:       Patient has no known allergies. Social History:     Tobacco: reports that she quit smoking about 22 years ago. She has a 10.00 pack-year smoking history. She has never used smokeless tobacco.  Alcohol:      reports no history of alcohol use. Drug Use:  reports no history of drug use. Family History:     Family History   Problem Relation Age of Onset    No Known Problems Mother     Diabetes Father        Review of Systems:         Review of Systems   Constitutional: Negative for chills, fever and weight loss. HENT: Negative for congestion, ear pain, rhinorrhea and sore throat. Eyes: Negative for blurred vision. Respiratory: Positive for cough. Negative for shortness of breath and wheezing. Cardiovascular: Negative for chest pain and palpitations. Gastrointestinal: Positive for abdominal pain and nausea. Negative for diarrhea, melena and vomiting. Genitourinary: Negative for dysuria and hematuria. Musculoskeletal: Negative for myalgias. Skin: Negative for rash. Neurological: Negative for headaches. Psychiatric/Behavioral: The patient is not nervous/anxious. Physical Exam:     Vitals:  /84   Pulse 85   Temp 98.1 °F (36.7 °C)   Wt 161 lb 9.6 oz (73.3 kg)   SpO2 96%   BMI 27.74 kg/m²       Physical Exam  Vitals reviewed. Constitutional:       General: She is not in acute distress. Appearance: Normal appearance. She is well-developed. HENT:      Head: Normocephalic and atraumatic. Eyes:      General: No scleral icterus.         Right 06/10/2021          Assessment & Plan        Diagnosis Orders   1. Type 2 diabetes mellitus without complication, without long-term current use of insulin ([facility])   Worsening course with hemoglobin A1c 9.6% today. For some reason she stopped taking Actos. She does not remember if she had side effects. Will continue on Metformin, Amaryl and add Trulicity 6.17 mg weekly. We discussed potential side effects with Trulicity. Advised to check glucose readings and keep a logbook. We will follow-up in 4 weeks POCT glycosylated hemoglobin (Hb A1C)    Dulaglutide (TRULICITY) 4.33 FRANCISCA/9.8PM SOPN   2. Essential hypertension   Blood pressure stable, continue on losartan    3. Gastroesophageal reflux disease without esophagitis   Started on omeprazole 20 mg twice daily. Advised to avoid spicy, fried, greasy food, coffee, alcohol omeprazole (PRILOSEC) 20 MG delayed release capsule   4. Epigastric abdominal pain   Most likely secondary to GERD. We will see if she improves with omeprazole. If no improvement, will refer to general surgeon for evaluation and possible EGD    5. Cough  Suspect this is related to underlying GERD. Will try on PPI to see if symptoms resolve. If no improvement, will consider further work-up                Completed Refills   Requested Prescriptions     Signed Prescriptions Disp Refills    omeprazole (PRILOSEC) 20 MG delayed release capsule 60 capsule 3     Sig: Take 1 capsule by mouth 2 times daily    Dulaglutide (TRULICITY) 6.44 DE/2.7UH SOPN 4 pen 0     Sig: Inject 0.75 mg into the skin once a week     Return in about 4 weeks (around 7/8/2021) for diabetes, gerd.      Orders Placed This Encounter   Medications    omeprazole (PRILOSEC) 20 MG delayed release capsule     Sig: Take 1 capsule by mouth 2 times daily     Dispense:  60 capsule     Refill:  3    Dulaglutide (TRULICITY) 9.41 ES/1.8FT SOPN     Sig: Inject 0.75 mg into the skin once a week     Dispense:  4 pen     Refill:  0     Orders

## 2021-06-11 RX ORDER — LOSARTAN POTASSIUM 25 MG/1
25 TABLET ORAL DAILY
Qty: 90 TABLET | Refills: 1 | Status: SHIPPED | OUTPATIENT
Start: 2021-06-11 | End: 2021-10-18

## 2021-06-11 NOTE — TELEPHONE ENCOUNTER
Refill request  Last OV 6/10/2021  Last date RX filled 11/13/2020  Next scheduled appt 7/15/2021  Medication pended      Health Maintenance   Topic Date Due    Hepatitis B vaccine (1 of 3 - Risk 3-dose series) Never done    Cervical cancer screen  Never done    Shingles Vaccine (1 of 2) Never done    Diabetic retinal exam  01/19/2019    Diabetic foot exam  05/12/2021    Diabetic microalbuminuria test  05/12/2021    A1C test (Diabetic or Prediabetic)  09/10/2021    Lipid screen  12/08/2021    Potassium monitoring  12/08/2021    Creatinine monitoring  12/08/2021    Breast cancer screen  06/03/2022    Pneumococcal 0-64 years Vaccine (2 of 2) 06/09/2027    DTaP/Tdap/Td vaccine (2 - Td or Tdap) 11/14/2027    Colon cancer screen colonoscopy  07/13/2030    Flu vaccine  Completed    COVID-19 Vaccine  Completed    Hepatitis C screen  Completed    HIV screen  Completed    Hepatitis A vaccine  Aged Out    Hib vaccine  Aged Out    Meningococcal (ACWY) vaccine  Aged Out             (applicable per patient's age: Cancer Screenings, Depression Screening, Fall Risk Screening, Immunizations)    Hemoglobin A1C (%)   Date Value   06/10/2021 9.6   11/13/2020 8.1   05/12/2020 8.3     Microalb/Crt.  Ratio (mcg/mg creat)   Date Value   01/22/2019 CANNOT BE CALCULATED     LDL Cholesterol (mg/dL)   Date Value   12/08/2020 81     AST (U/L)   Date Value   12/08/2020 20     ALT (U/L)   Date Value   12/08/2020 27     BUN (mg/dL)   Date Value   12/08/2020 20      (goal A1C is < 7)   (goal LDL is <100) need 30-50% reduction from baseline     BP Readings from Last 3 Encounters:   06/10/21 136/84   11/13/20 130/80   10/19/20 (!) 159/87    (goal /80)      All Future Testing planned in [facility]:  Lab Frequency Next Occurrence   XR KNEE RIGHT (3 VIEWS) Once 09/29/2020   XR KNEE LEFT (3 VIEWS) Once 09/29/2020   XR HIP 2-3 VW W PELVIS RIGHT Once 09/29/2020       Next Visit Date:  Future Appointments   Date Time Provider Ignacio Barry   7/15/2021  2:30 PM Bri Guillen MD [facility]            Patient Active Problem List:     Type 2 diabetes mellitus without complication, without long-term current use of insulin ([facility] [address]     Mixed hyperlipidemia     Anxiety     Hyperlipidemia     Hypertension     Nonobstructive atherosclerosis of coronary artery     Depression     Gastroesophageal reflux disease without esophagitis     Epigastric abdominal pain

## 2021-07-14 NOTE — PATIENT INSTRUCTIONS
SURVEY:    You may be receiving a survey from [employer] regarding your visit today. Please complete the survey to enable us to provide the highest quality of care to you and your family. If you cannot score us a very good on any question, please call the office to discuss how we could of made your experience a very good one. Thank you. You may be receiving a survey from [employer] regarding your visit today. You may get this in the mail, through your MyChart or in your email. Please complete the survey to enable us to provide the highest quality of care to you and your family. If you cannot score us as very good ( 5 Stars) on any question, please feel free to call the office to discuss how we could have made your experience exceptional.     Thank You!       MD Magen Jordan

## 2021-07-15 ENCOUNTER — OFFICE VISIT (OUTPATIENT)
Dept: FAMILY MEDICINE CLINIC | Age: 61
End: 2021-07-15
Payer: COMMERCIAL

## 2021-07-15 VITALS
TEMPERATURE: 97.7 F | DIASTOLIC BLOOD PRESSURE: 80 MMHG | BODY MASS INDEX: 27.6 KG/M2 | HEART RATE: 50 BPM | SYSTOLIC BLOOD PRESSURE: 130 MMHG | WEIGHT: 160.8 LBS | OXYGEN SATURATION: 98 %

## 2021-07-15 DIAGNOSIS — K21.9 GASTROESOPHAGEAL REFLUX DISEASE WITHOUT ESOPHAGITIS: ICD-10-CM

## 2021-07-15 DIAGNOSIS — E11.9 TYPE 2 DIABETES MELLITUS WITHOUT COMPLICATION, WITHOUT LONG-TERM CURRENT USE OF INSULIN (HCC): Primary | ICD-10-CM

## 2021-07-15 PROBLEM — R10.13 EPIGASTRIC ABDOMINAL PAIN: Status: RESOLVED | Noted: 2021-06-10 | Resolved: 2021-07-15

## 2021-07-15 PROCEDURE — 99213 OFFICE O/P EST LOW 20 MIN: CPT | Performed by: INTERNAL MEDICINE

## 2021-07-15 ASSESSMENT — ENCOUNTER SYMPTOMS
NAUSEA: 0
SORE THROAT: 0
SHORTNESS OF BREATH: 0
COUGH: 0
ABDOMINAL PAIN: 0
BLURRED VISION: 0

## 2021-07-15 NOTE — PROGRESS NOTES
HPI Notes    Name: Yusuf Needs  : 1960         Chief Complaint:     Chief Complaint   Patient presents with    Diabetes     Patient presents today for a 1 month check up    Gastroesophageal Reflux       History of Present Illness:        Mk Gallo presents to office to follow up for DM and GERD    She was started on Trulicity on  for worsening diabetes. States did not get her Trulicity because ? pharmacy did not have it. She did not call us with this problem and essentially continued same medications  She has been eating healthier and sugar readings are improving     Diabetes  She presents for her follow-up diabetic visit. She has type 2 diabetes mellitus. Her disease course has been improving. Pertinent negatives for hypoglycemia include no dizziness, headaches or nervousness/anxiousness. Pertinent negatives for diabetes include no blurred vision, no chest pain and no weight loss. There are no hypoglycemic complications. Symptoms are improving. Pertinent negatives for diabetic complications include no CVA, heart disease or peripheral neuropathy. Risk factors for coronary artery disease include diabetes mellitus, obesity and hypertension. Current diabetic treatment includes oral agent (dual therapy). She is compliant with treatment all of the time. She is following a generally unhealthy diet. Meal planning includes avoidance of concentrated sweets. Her home blood glucose trend is decreasing steadily. Her overall blood glucose range is 180-200 mg/dl. An ACE inhibitor/angiotensin II receptor blocker is being taken. Gastroesophageal Reflux  She reports no abdominal pain, no chest pain, no coughing, no nausea or no sore throat. This is a chronic problem. The current episode started more than 1 year ago. The problem occurs rarely. The problem has been rapidly improving. The symptoms are aggravated by certain foods. Pertinent negatives include no weight loss.  Risk factors include obesity and lack of exercise. She has tried a PPI for the symptoms. The treatment provided significant relief. Past Medical History:     Past Medical History:   Diagnosis Date    Diabetes mellitus ([address]     Hyperlipidemia     Hypertension       Reviewed all health maintenance requirements and orderedappropriate tests  Health Maintenance Due   Topic Date Due    Hepatitis B vaccine (1 of 3 - Risk 3-dose series) Never done    Cervical cancer screen  Never done    Shingles Vaccine (1 of 2) Never done    Diabetic retinal exam  01/19/2019    Diabetic foot exam  05/12/2021    Diabetic microalbuminuria test  05/12/2021       Past Surgical History:     Past Surgical History:   Procedure Laterality Date    BREAST BIOPSY Left     COLONOSCOPY N/A 7/13/2020    COLONOSCOPY performed by Da Sabillon MD at [address] [place] [address]    uterine cancer        Medications:       Prior to Admission medications    Medication Sig Start Date End Date Taking?  Authorizing Provider   losartan (COZAAR) 25 MG tablet Take 1 tablet by mouth daily 6/11/21  Yes Rcik Zuniga MD   omeprazole (PRILOSEC) 20 MG delayed release capsule Take 1 capsule by mouth 2 times daily 6/10/21  Yes Rick Zuniga MD   Dulaglutide (TRULICITY) 2.25 TJ/7.9RU SOPN Inject 0.75 mg into the skin once a week 6/10/21  Yes Rick Zuniga MD   glimepiride (AMARYL) 4 MG tablet Take 1 tablet by mouth 2 times daily 5/20/21  Yes Rick Zuniga MD   metFORMIN (GLUCOPHAGE) 1000 MG tablet Take 1 tablet by mouth 2 times daily (with meals) 4/20/21  Yes Rick Zuniga MD   Lancets MISC Please dispense lancets to go with meter and test strips  DX E11.9 pt tests QID 10/26/17  Yes Claudis Dandy, MD   Multiple Vitamins-Minerals (THERAPEUTIC MULTIVITAMIN-MINERALS) tablet Take 1 tablet by mouth daily   Yes Historical Provider, MD   citalopram (CELEXA) 20 MG tablet Take 1 tablet by mouth daily  Patient not taking: Reported on 7/15/2021 4/20/21   Rick Zuniga MD   aspirin 81 MG EC tablet Take 1 tablet by mouth daily  Patient not taking: Reported on 6/10/2021 11/13/20   Tre De Anda MD   atorvastatin (LIPITOR) 40 MG tablet Take 1 tablet by mouth daily  Patient not taking: Reported on 6/10/2021 11/13/20   Tre De Anda MD   clotrimazole-betamethasone (LOTRISONE) 1-0.05 % cream Apply topically 2 times daily. Patient not taking: Reported on 11/13/2020 6/11/20   Tre De Anda MD   clobetasol prop emollient base (CLOBETASOL PROPIONATE E) 0.05 % CREA Apply topically daily  Patient not taking: Reported on 11/13/2020 4/22/19   Tre De Anda MD   hydrocortisone 2.5 % cream Apply topically 2 times daily. Patient not taking: Reported on 11/13/2020 4/5/18   Tre De Anda MD   Blood Glucose Monitoring Suppl MILANA 1 Units by Does not apply route once for 1 dose DX-E11.9 10/26/17 10/26/17  Hansel Owens MD   Glucose Blood (BLOOD GLUCOSE TEST STRIPS) STRP Please dispense strips  That are covered by her insurance and her meter  DX-E11.9  Pt tests 4 x day  Patient not taking: Reported on 7/15/2021 10/26/17   Hansel Owens MD   calcium carbonate (OSCAL) 500 MG TABS tablet Take 500 mg by mouth daily  Patient not taking: Reported on 6/10/2021    Historical Provider, MD        Allergies:       Patient has no known allergies. Social History:     Tobacco: reports that she quit smoking about 22 years ago. She has a 10.00 pack-year smoking history. She has never used smokeless tobacco.  Alcohol:      reports no history of alcohol use. Drug Use:  reports no history of drug use. Family History:     Family History   Problem Relation Age of Onset    No Known Problems Mother     Diabetes Father        Review of Systems:         Review of Systems   Constitutional: Negative for activity change, appetite change, chills, fever and weight loss. HENT: Negative for congestion and sore throat. Eyes: Negative for blurred vision.    Respiratory: Negative for cough and shortness of breath. Cardiovascular: Negative for chest pain and palpitations. Gastrointestinal: Negative for abdominal pain and nausea. Genitourinary: Negative for dysuria. Skin: Negative for rash. Neurological: Negative for dizziness and headaches. Psychiatric/Behavioral: The patient is not nervous/anxious. Physical Exam:     Vitals:  /80   Pulse 50   Temp 97.7 °F (36.5 °C)   Wt 160 lb 12.8 oz (72.9 kg)   SpO2 98%   BMI 27.60 kg/m²       Physical Exam  Vitals reviewed. Constitutional:       General: She is not in acute distress. Appearance: She is well-developed. HENT:      Head: Normocephalic and atraumatic. Neck:      Thyroid: No thyromegaly. Cardiovascular:      Rate and Rhythm: Normal rate and regular rhythm. Heart sounds: Normal heart sounds. No murmur heard. Pulmonary:      Effort: Pulmonary effort is normal.      Breath sounds: Normal breath sounds. No wheezing or rales. Abdominal:      General: Bowel sounds are normal. There is no distension. Palpations: Abdomen is soft. There is no mass. Tenderness: There is no abdominal tenderness. Musculoskeletal:         General: Normal range of motion. Right lower leg: No edema. Left lower leg: No edema. Lymphadenopathy:      Cervical: No cervical adenopathy. Skin:     General: Skin is warm and dry. Findings: No rash. Neurological:      Mental Status: She is alert and oriented to person, place, and time.    Psychiatric:         Behavior: Behavior normal.         Judgment: Judgment normal.               Data:     Lab Results   Component Value Date     12/08/2020    K 4.0 12/08/2020     12/08/2020    CO2 27 12/08/2020    BUN 20 12/08/2020    CREATININE 0.62 12/08/2020    GLUCOSE 169 12/08/2020    PROT 6.9 12/08/2020    LABALBU 4.2 12/08/2020    BILITOT 0.24 12/08/2020    ALKPHOS 74 12/08/2020    AST 20 12/08/2020    ALT 27 12/08/2020     Lab Results   Component Value Date WBC 5.9 10/19/2020    RBC 4.62 10/19/2020    HGB 14.2 10/19/2020    HCT 42.4 10/19/2020    MCV 91.8 10/19/2020    MCH 30.7 10/19/2020    MCHC 33.5 10/19/2020    RDW 13.8 10/19/2020     10/19/2020    MPV NOT REPORTED 10/19/2020     Lab Results   Component Value Date    TSH 3.82 10/19/2020     Lab Results   Component Value Date    CHOL 172 12/08/2020    HDL 75 12/08/2020    LABA1C 9.6 06/10/2021          Assessment & Plan        Diagnosis Orders   1. Type 2 diabetes mellitus without complication, without long-term current use of insulin (HCC)   Per pt glucose readings improving. I called [facility] [address] [place] [address] and was told pt's Trulicity order was received on 6/10 and ready for . Pt will start using trulicity . Follow up in 2 months. 2. Gastroesophageal reflux disease without esophagitis   Improved, continue on Omeprazole                    Completed Refills   Requested Prescriptions      No prescriptions requested or ordered in this encounter     Return in about 2 months (around 9/15/2021) for HTN, diabetes, gerd. No orders of the defined types were placed in this encounter. No orders of the defined types were placed in this encounter. Patient Instructions   SURVEY:    You may be receiving a survey from [employer] regarding your visit today. Please complete the survey to enable us to provide the highest quality of care to you and your family. If you cannot score us a very good on any question, please call the office to discuss how we could of made your experience a very good one. Thank you. You may be receiving a survey from [employer] regarding your visit today. You may get this in the mail, through your MyChart or in your email. Please complete the survey to enable us to provide the highest quality of care to you and your family.     If you cannot score us as very good ( 5 Stars) on any question, please feel free to call the office to discuss how we could have made your experience exceptional.     Thank You!       MD Virgen Cordova  GABRIELA        Electronically signed by Jasmyne Lockwood MD on 7/15/2021 at 9:10 PM           Completed Refills      Requested Prescriptions      No prescriptions requested or ordered in this encounter

## 2021-10-18 ENCOUNTER — NURSE TRIAGE (OUTPATIENT)
Dept: OTHER | Facility: CLINIC | Age: 61
End: 2021-10-18

## 2021-10-18 ENCOUNTER — HOSPITAL ENCOUNTER (EMERGENCY)
Age: 61
Discharge: HOME | End: 2021-10-18
Attending: FAMILY MEDICINE
Payer: COMMERCIAL

## 2021-10-18 VITALS
DIASTOLIC BLOOD PRESSURE: 80 MMHG | BODY MASS INDEX: 27.81 KG/M2 | WEIGHT: 162 LBS | OXYGEN SATURATION: 99 % | TEMPERATURE: 97.8 F | HEART RATE: 68 BPM | SYSTOLIC BLOOD PRESSURE: 142 MMHG | RESPIRATION RATE: 16 BRPM

## 2021-10-18 DIAGNOSIS — Z76.0 ENCOUNTER FOR MEDICATION REFILL: Primary | ICD-10-CM

## 2021-10-18 DIAGNOSIS — E11.9 TYPE 2 DIABETES MELLITUS WITHOUT COMPLICATION, WITHOUT LONG-TERM CURRENT USE OF INSULIN (HCC): ICD-10-CM

## 2021-10-18 DIAGNOSIS — K21.9 GASTROESOPHAGEAL REFLUX DISEASE WITHOUT ESOPHAGITIS: ICD-10-CM

## 2021-10-18 DIAGNOSIS — Z86.39 HISTORY OF DIABETES MELLITUS: ICD-10-CM

## 2021-10-18 LAB — GLUCOSE BLD-MCNC: 193 MG/DL (ref 65–99)

## 2021-10-18 PROCEDURE — 82947 ASSAY GLUCOSE BLOOD QUANT: CPT

## 2021-10-18 PROCEDURE — 99283 EMERGENCY DEPT VISIT LOW MDM: CPT

## 2021-10-18 RX ORDER — DULAGLUTIDE 0.75 MG/.5ML
0.75 INJECTION, SOLUTION SUBCUTANEOUS WEEKLY
Qty: 4 PEN | Refills: 1 | Status: SHIPPED | OUTPATIENT
Start: 2021-10-18

## 2021-10-18 RX ORDER — OMEPRAZOLE 20 MG/1
20 CAPSULE, DELAYED RELEASE ORAL 2 TIMES DAILY
Qty: 60 CAPSULE | Refills: 1 | Status: SHIPPED | OUTPATIENT
Start: 2021-10-18

## 2021-10-18 ASSESSMENT — ENCOUNTER SYMPTOMS: COUGH: 0

## 2021-10-18 NOTE — ED PROVIDER NOTES
[facility]  eMERGENCY dEPARTMENT eNCOUnter          [facility]       Chief Complaint   Patient presents with    Hyperglycemia     FSBS was 200's this AM - ran out of trulicity and now sugars are running high        Nurses Notes reviewed and I agree except as noted in the HPI. HISTORY OF PRESENT ILLNESS    Erica Nunez is a 63 y.o. female who presents to the emergency room via private vehicle, patient states that her blood sugars been elevated, yesterday blood sugar 375 and today of 250, patient states that she been out of her Trulicity medication and try to call her primary care office but they told her to go to the emergency room. Patient denies any pain, denies any dysuria or polyuria, denies any cough or respiratory issues, denies any wounds in her bodies. Patient states compliant with other medications but does not she is also out of her omeprazole. PCP: Sarah Santana    REVIEW OF SYSTEMS     Review of Systems   Constitutional: Negative for fever. Respiratory: Negative for cough. Genitourinary: Negative for dysuria and frequency. Skin: Negative for wound. All other systems reviewed and are negative. PAST MEDICAL HISTORY    has a past medical history of Diabetes mellitus ([facility] [address] Hyperlipidemia, and Hypertension. SURGICAL HISTORY      has a past surgical history that includes Hysterectomy (1986); Breast biopsy (Left); and Colonoscopy (N/A, 7/13/2020). CURRENT MEDICATIONS       Current Discharge Medication List      CONTINUE these medications which have NOT CHANGED    Details   glimepiride (AMARYL) 4 MG tablet Take 1 tablet by mouth 2 times daily  Qty: 60 tablet, Refills: 3    Comments: We are requesting this refill to have on file for next month s order.   Associated Diagnoses: Type 2 diabetes mellitus without complication, without long-term current use of insulin ([facility])      citalopram (CELEXA) 20 MG tablet Take 1 tablet by mouth daily  Qty: 30 tablet, Refills: 5    Comments: We are requesting this refill to have on file for next month s order. Associated Diagnoses: Depression, unspecified depression type      metFORMIN (GLUCOPHAGE) 1000 MG tablet Take 1 tablet by mouth 2 times daily (with meals)  Qty: 60 tablet, Refills: 5    Comments: We are requesting this refill to have on file for next month s order. Associated Diagnoses: Type 2 diabetes mellitus without complication, without long-term current use of insulin (HCC)      aspirin 81 MG EC tablet Take 1 tablet by mouth daily  Qty: 30 tablet, Refills: 0      Multiple Vitamins-Minerals (THERAPEUTIC MULTIVITAMIN-MINERALS) tablet Take 1 tablet by mouth daily      clobetasol prop emollient base (CLOBETASOL PROPIONATE E) 0.05 % CREA Apply topically daily  Qty: 45 g, Refills: 0    Associated Diagnoses: Eczema, unspecified type      Blood Glucose Monitoring Suppl MILANA 1 Units by Does not apply route once for 1 dose DX-E11.9  Qty: 1 Device, Refills: 0    Comments: Please dispense a meter that is covered by her insurance  Dx E11.9      Glucose Blood (BLOOD GLUCOSE TEST STRIPS) STRP Please dispense strips  That are covered by her insurance and her meter  DX-E11.9  Pt tests 4 x day  Qty: 100 strip, Refills: 5      Lancets MISC Please dispense lancets to go with meter and test strips  DX E11.9 pt tests QID  Qty: 100 each, Refills: 5             ALLERGIES     has No Known Allergies. FAMILY HISTORY     She indicated that her mother is alive. She indicated that her father is alive. family history includes Diabetes in her father; No Known Problems in her mother. SOCIAL HISTORY      reports that she quit smoking about 23 years ago. She has a 10.00 pack-year smoking history. She has never used smokeless tobacco. She reports that she does not drink alcohol and does not use drugs. PHYSICAL EXAM     INITIAL VITALS:  weight is 162 lb (73.5 kg). Her oral temperature is 97.8 °F (36.6 °C). Her pulse is 68.  Her respiration is 16 and oxygen saturation is 99%. Physical Exam   Constitutional: Patient is oriented to person, place, and time. Patient appears well-developed and well-nourished. Patient is active and cooperative. HENT:   Head: Normocephalic and atraumatic. Head is without contusion. Right Ear: Hearing and external ear normal. No drainage. Left Ear: Hearing and external ear normal. No drainage. Nose: Nose normal. No nasal deformity. No epistaxis. Mouth/Throat: Mucous membranes are not dry. Eyes: EOMI. Conjunctivae, sclera, and lids are normal. Right eye exhibits no discharge. Left eye exhibits no discharge. Neck: Full passive range of motion without pain and phonation normal.   Cardiovascular:  Normal rate, regular rhythm and intact distal pulses. Pulses: Right radial pulse  2+   Pulmonary/Chest: Effort normal. No tachypnea and no bradypnea. No wheezes, rhonchi, or rales. Abdominal: BMI 27.8, Soft. Patient without distension or tenderness  Musculoskeletal:   Negative acute trauma or deformity,  apparent full range of motion and normal strength all extremities appropriate to age. Neurological: Patient is alert and oriented to person, place, and time. patient displays no tremor. Patient displays no seizure activity. .   Skin: Skin is warm and dry. Patient is not diaphoretic. Psychiatric: Patient has a normal mood and quiet affect.  Patient speech is normal and behavior is normal. Cognition and memory are normal.    DIFFERENTIAL DIAGNOSIS:   Med refill    DIAGNOSTIC RESULTS           RADIOLOGY: non-plain film images(s) such as CT, Ultrasound and MRI are read by the radiologist.  No orders to display       LABS:   Labs Reviewed - No data to display    EMERGENCY DEPARTMENT COURSE:   Vitals:    Vitals:    10/18/21 1012   Pulse: 68   Resp: 16   Temp: 97.8 °F (36.6 °C)   TempSrc: Oral   SpO2: 99%   Weight: 162 lb (73.5 kg)     Patient history and physical exam taken at bedside, discussed patient symptoms and exam findings, we did get a POC glucose found to be 195 mg/dL, as patient is not having any respiratory or urinary symptoms, no wounds on her body, no pain, and I feel any additional testing would be warranted, I do offer patient to refill her Trulicity and as she is out of her omeprazole we will also fill this at her discretion, with prescriptions electronically sent to her pharmacy of choice, otherwise advise following up with her primary care, return to ER if any symptoms change worse other concerns, patient acknowledges    FINAL IMPRESSION      1. Encounter for medication refill    2. History of diabetes mellitus    3. Type 2 diabetes mellitus without complication, without long-term current use of insulin ([address]    4. Gastroesophageal reflux disease without esophagitis          DISPOSITION/PLAN   D/c    PATIENT REFERRED TO:  MD Constantin Mckee [facility] [address] [place] [address]  [phone]    Call       [facility] ED  [address] [facility] [address] [phone]    As needed, If symptoms worsen      DISCHARGE MEDICATIONS:  Current Discharge Medication List              Summation      Patient Course:  D/c    ED Medications administered this visit:  Medications - No data to display    New Prescriptions from this visit:    Current Discharge Medication List          Follow-up:  Rios Giordano MD  [address] [place] [id number] [phone]    Call       [facility] ED  [address] [place] [id number] [phone]    As needed, If symptoms worsen        Final Impression:   1. Encounter for medication refill    2. History of diabetes mellitus    3. Type 2 diabetes mellitus without complication, without long-term current use of insulin ([address]    4.  Gastroesophageal reflux disease without esophagitis               (Please note that portions of this note were completed with a voice recognition program.  Efforts were made to edit the dictations but occasionally words are mis-transcribed.)    MD Juani Murillo MD  10/18/21 8839

## 2021-10-18 NOTE — TELEPHONE ENCOUNTER
care of your patient. The patient was connected to triage in response to information provided to the ECC/PSC. Please do not respond through this encounter as the response is not directed to a shared pool.

## 2021-10-22 ENCOUNTER — TELEPHONE (OUTPATIENT)
Dept: FAMILY MEDICINE CLINIC | Age: 61
End: 2021-10-22

## 2021-10-22 NOTE — TELEPHONE ENCOUNTER
[facility] Trans[facility] Initial Follow Up Call    Outreach made within 2 business days of discharge: Yes    Patient: Faustino Gonzalez Patient : 1960   MRN: [id number]  Reason for Admission: There are no discharge diagnoses documented for the most recent discharge. Discharge Date: 10/18/21       Spoke with: Servando Valentino      Discharge department/facility: [facility]    TCM Interactive Patient Contact:  Was patient able to fill all prescriptions:   Was patient instructed to bring all medications to the follow-up visit:  Is patient taking all medications as directed in the discharge summary? Does patient understand their discharge instructions:   Does patient have questions or concerns that need addressed prior to 7-14 day follow up office visit:     [address]    Scheduled appointment with PCP within 7-14 days    Follow Up  No future appointments.     Tameka Botello, CMA

## 2022-02-13 DIAGNOSIS — E11.9 TYPE 2 DIABETES MELLITUS WITHOUT COMPLICATION, WITHOUT LONG-TERM CURRENT USE OF INSULIN (HCC): ICD-10-CM

## 2022-05-05 DIAGNOSIS — E78.5 HYPERLIPIDEMIA, UNSPECIFIED HYPERLIPIDEMIA TYPE: ICD-10-CM

## 2022-05-05 DIAGNOSIS — E11.9 TYPE 2 DIABETES MELLITUS WITHOUT COMPLICATION, WITHOUT LONG-TERM CURRENT USE OF INSULIN (HCC): ICD-10-CM

## 2022-05-05 RX ORDER — LOSARTAN POTASSIUM 25 MG/1
25 TABLET ORAL DAILY
Qty: 90 TABLET | Refills: 1 | Status: SHIPPED | OUTPATIENT
Start: 2022-05-05

## 2022-05-05 RX ORDER — GLIMEPIRIDE 4 MG/1
TABLET ORAL
Qty: 60 TABLET | Refills: 3 | Status: SHIPPED | OUTPATIENT
Start: 2022-05-05

## (undated) DEVICE — MEDI-VAC NON-CONDUCTIVE SUCTION TUBING 6MM X 6.1M (20 FT.) L: Brand: CARDINAL HEALTH

## (undated) DEVICE — GOWN,AURORA,NONRNF,XL,30/CS: Brand: MEDLINE

## (undated) DEVICE — JELLY LUBRICATING 4OZ FLIP TOP TB E Z

## (undated) DEVICE — FORCEPS BX L240CM JAW DIA2.2MM RAD JAW 4 HOT DISP